# Patient Record
Sex: MALE | Race: WHITE | NOT HISPANIC OR LATINO | Employment: FULL TIME | URBAN - METROPOLITAN AREA
[De-identification: names, ages, dates, MRNs, and addresses within clinical notes are randomized per-mention and may not be internally consistent; named-entity substitution may affect disease eponyms.]

---

## 2023-09-27 ENCOUNTER — OFFICE VISIT (OUTPATIENT)
Dept: OBGYN CLINIC | Facility: CLINIC | Age: 63
End: 2023-09-27
Payer: COMMERCIAL

## 2023-09-27 ENCOUNTER — APPOINTMENT (OUTPATIENT)
Dept: RADIOLOGY | Facility: CLINIC | Age: 63
End: 2023-09-27
Payer: COMMERCIAL

## 2023-09-27 VITALS
HEART RATE: 68 BPM | WEIGHT: 166.2 LBS | BODY MASS INDEX: 26.71 KG/M2 | DIASTOLIC BLOOD PRESSURE: 72 MMHG | SYSTOLIC BLOOD PRESSURE: 113 MMHG | HEIGHT: 66 IN

## 2023-09-27 DIAGNOSIS — M75.22 BICEPS TENDINITIS OF LEFT SHOULDER: Primary | ICD-10-CM

## 2023-09-27 DIAGNOSIS — M25.512 LEFT SHOULDER PAIN, UNSPECIFIED CHRONICITY: ICD-10-CM

## 2023-09-27 PROCEDURE — 73030 X-RAY EXAM OF SHOULDER: CPT

## 2023-09-27 PROCEDURE — 99204 OFFICE O/P NEW MOD 45 MIN: CPT | Performed by: ORTHOPAEDIC SURGERY

## 2023-09-27 RX ORDER — TADALAFIL 5 MG/1
5 TABLET ORAL DAILY
COMMUNITY
Start: 2023-08-08

## 2023-09-27 RX ORDER — TINIDAZOLE 500 MG/1
500 TABLET ORAL DAILY
COMMUNITY
Start: 2023-08-22

## 2023-09-27 RX ORDER — MIRTAZAPINE 15 MG/1
15 TABLET, FILM COATED ORAL
COMMUNITY
Start: 2023-08-08

## 2023-09-27 RX ORDER — TESTOSTERONE CYPIONATE 200 MG/ML
INJECTION, SOLUTION INTRAMUSCULAR
COMMUNITY
Start: 2023-08-22

## 2023-09-27 RX ORDER — DOXYCYCLINE HYCLATE 200 MG/1
1 TABLET, DELAYED RELEASE ORAL DAILY
COMMUNITY
Start: 2023-09-21

## 2023-09-27 RX ORDER — SYRINGE WITH NEEDLE, 1 ML 25GX5/8"
SYRINGE, EMPTY DISPOSABLE MISCELLANEOUS
COMMUNITY
Start: 2023-08-22

## 2023-09-27 RX ORDER — NEEDLES, DISPOSABLE 25GX5/8"
NEEDLE, DISPOSABLE MISCELLANEOUS
COMMUNITY
Start: 2023-08-24

## 2023-09-27 NOTE — PROGRESS NOTES
Assessment/Plan:  1. Biceps tendinitis of left shoulder  XR shoulder 2+ vw left        Mary has left-sided shoulder pain which appears to be related to his biceps tendon. He has anterior shoulder pain and pain with speeds testing and this could be coming from overuse  related to biceps curls. His x-rays are within normal limits and he does not demonstrate any significant weakness to suggest rotator cuff injury on exam today. I discussed treatment options including home exercise, physical therapy or ultrasound-guided biceps tendon sheath injection. He will try home exercises for now and would like to schedule an ultrasound-guided biceps tendon sheath injection in our [facility] office. We will plan on proceeding with this injection in the next few weeks. Follow-up with me for the injection and begin home exercises, ice and anti-inflammatories for now. Subjective:   Allen Dahl is a 59 y.o. male who presents to the office for evaluation for left-sided shoulder pain. He denies any recent injury or trauma. Has been feeling increased discomfort in the left shoulder for the last 1 to 2 months. He states that he likes to exercise and lift weights but the pain has been increased so he has not done so recently. He likes to do push-ups and bicep curls. He denies feeling 1 specific day where the pain began. It seems to worsen in the morning a after he wakes up. He denies any weakness or numbness. He does have a current diagnosis of Lyme disease and is wondering if this is related. He states one of the biggest pains is reaching backwards into the backseat of the car. He has not tried any medications for this. He has only tried resting from physical exercise. Review of Systems   Constitutional: Negative for chills, fever and unexpected weight change. HENT: Negative for hearing loss, nosebleeds and sore throat. Eyes: Negative for pain, redness and visual disturbance.    Respiratory: Negative for cough, shortness of breath and wheezing. Cardiovascular: Negative for chest pain, palpitations and leg swelling. Gastrointestinal: Negative for abdominal pain, nausea and vomiting. Endocrine: Negative for polyphagia and polyuria. Genitourinary: Negative for dysuria and hematuria. Musculoskeletal:        See HPI   Skin: Negative for rash and wound. Neurological: Negative for dizziness, numbness and headaches. Psychiatric/Behavioral: Negative for decreased concentration and suicidal ideas. The patient is not nervous/anxious. Past Medical History:   Diagnosis Date   • Lyme disease        Past Surgical History:   Procedure Laterality Date   • BACK SURGERY         Family History   Family history unknown: Yes       Social History     Occupational History   • Not on file   Tobacco Use   • Smoking status: Every Day     Types: Cigarettes   • Smokeless tobacco: Never   Vaping Use   • Vaping Use: Never used   Substance and Sexual Activity   • Alcohol use: Yes   • Drug use: Never   • Sexual activity: Not on file         Current Outpatient Medications:   •  B-D 3CC LUER-NATANAEL SYR 10FM8-2/2 23G X 1-1/2" 3 ML MISC, use 1 every week, Disp: , Rfl:   •  BD Disp Needle 23G X 1" MISC, use 1 every week, Disp: , Rfl:   •  Doxycycline Hyclate 200 MG TBEC, Take 1 tablet by mouth daily, Disp: , Rfl:   •  mirtazapine (REMERON) 15 mg tablet, Take 15 mg by mouth daily at bedtime, Disp: , Rfl:   •  tadalafil (CIALIS) 5 MG tablet, Take 5 mg by mouth daily, Disp: , Rfl:   •  testosterone cypionate (DEPO-TESTOSTERONE) 200 mg/mL SOLN, inject 1/2 milliliter every week then DISCARD REMAINDER in vial, Disp: , Rfl:   •  tinidazole (TINDAMAX) 500 MG tablet, Take 500 mg by mouth daily, Disp: , Rfl:     No Known Allergies    Objective:  Vitals:    09/27/23 0841   BP: 113/72   Pulse: 68            Left Shoulder Exam     Tenderness   The patient is experiencing tenderness in the biceps tendon.     Range of Motion   Active abduction: normal   Passive abduction: normal   Extension: normal   External rotation: normal   Forward flexion: normal   Internal rotation 0 degrees: normal     Muscle Strength   Abduction: 5/5   Internal rotation: 5/5   External rotation: 5/5   Supraspinatus: 5/5   Subscapularis: 5/5   Biceps: 5/5     Tests   Kang test: negative  Drop arm: negative    Other   Erythema: absent  Sensation: normal     Comments:  Positive Neer's test  Positive speeds test            Physical Exam  Vitals and nursing note reviewed. Constitutional:       Appearance: Normal appearance. He is well-developed. HENT:      Head: Normocephalic and atraumatic. Right Ear: External ear normal.      Left Ear: External ear normal.      Nose: Nose normal.   Eyes:      General: No scleral icterus. Extraocular Movements: Extraocular movements intact. Conjunctiva/sclera: Conjunctivae normal.   Cardiovascular:      Rate and Rhythm: Normal rate. Pulmonary:      Effort: Pulmonary effort is normal. No respiratory distress. Musculoskeletal:      Cervical back: Normal range of motion and neck supple. Comments: See Ortho exam   Skin:     General: Skin is warm and dry. Neurological:      General: No focal deficit present. Mental Status: He is alert and oriented to person, place, and time. Psychiatric:         Behavior: Behavior normal.         I have personally reviewed pertinent films in PACS and my interpretation is as follows:  X-rays of the left shoulder demonstrate no evidence of acute fracture. Small calcification in the distal supraspinatus region      This document was created using speech voice recognition software. Grammatical errors, random word insertions, pronoun errors, and incomplete sentences are an occasional consequence of this system due to software limitations, ambient noise, and hardware issues.    Any formal questions or concerns about content, text, or information contained within the body of this dictation should be directly addressed to the provider for clarification.

## 2023-10-12 VITALS
HEIGHT: 66 IN | HEART RATE: 90 BPM | WEIGHT: 166 LBS | DIASTOLIC BLOOD PRESSURE: 80 MMHG | SYSTOLIC BLOOD PRESSURE: 140 MMHG | BODY MASS INDEX: 26.68 KG/M2

## 2023-10-12 DIAGNOSIS — M75.22 BICEPS TENDINITIS OF LEFT SHOULDER: Primary | ICD-10-CM

## 2023-10-12 PROCEDURE — 20611 DRAIN/INJ JOINT/BURSA W/US: CPT | Performed by: ORTHOPAEDIC SURGERY

## 2023-10-12 PROCEDURE — 99213 OFFICE O/P EST LOW 20 MIN: CPT | Performed by: ORTHOPAEDIC SURGERY

## 2023-10-12 RX ADMIN — LIDOCAINE HYDROCHLORIDE 2 ML: 10 INJECTION, SOLUTION INFILTRATION; PERINEURAL at 15:15

## 2023-10-12 RX ADMIN — TRIAMCINOLONE ACETONIDE 40 MG: 40 INJECTION, SUSPENSION INTRA-ARTICULAR; INTRAMUSCULAR at 15:15

## 2023-10-12 NOTE — PROGRESS NOTES
Assessment/Plan:  1. Biceps tendinitis of left shoulder  Large joint arthrocentesis          Mary has left shoulder pain and significant discomfort and fluid around the biceps tendon sheath visualized on ultrasound today. We did proceed with an ultrasound-guided biceps tendon sheath injection today and he tolerated this well this did give him immediate relief. Hopefully this gives him longstanding relief over the next few weeks with activity. He will follow-up with me if the pain persists or worsens. Large joint arthrocentesis  Universal Protocol:  Risks and benefits: risks, benefits and alternatives were discussed  Consent given by: patient  Time out: Immediately prior to procedure a "time out" was called to verify the correct patient, procedure, equipment, support staff and site/side marked as required. Site marked: the operative site was marked  Supporting Documentation  Indications: pain   Procedure Details  Location: shoulder - Shoulder joint: Left proximal biceps tendon sheath. Preparation: Patient was prepped and draped in the usual sterile fashion  Needle size: 25 G  Ultrasound guidance: yes  Approach: anterior  Medications administered: 2 mL lidocaine 1 %; 40 mg triamcinolone acetonide 40 mg/mL    Patient tolerance: patient tolerated the procedure well with no immediate complications  Dressing:  Sterile dressing applied          Subjective:   Stephen Tafoya is a 59 y.o. male who presents for an ultrasound-guided biceps tendon sheath injection. He has been feeling ongoing discomfort in the anterior aspect of the shoulder and at last office visit I felt that his pain was most consistent with biceps tendinitis. He continues to have discomfort over the anterior portion of shoulder that worsens with moving or reaching behind him. He denies any recent injury or fall. Review of Systems   Constitutional:  Negative for chills, fever and unexpected weight change.    HENT:  Negative for hearing loss, nosebleeds and sore throat. Eyes:  Negative for pain, redness and visual disturbance. Respiratory:  Negative for cough, shortness of breath and wheezing. Cardiovascular:  Negative for chest pain, palpitations and leg swelling. Gastrointestinal:  Negative for abdominal pain, nausea and vomiting. Endocrine: Negative for polyphagia and polyuria. Genitourinary:  Negative for dysuria and hematuria. Musculoskeletal:         See HPI   Skin:  Negative for rash and wound. Neurological:  Negative for dizziness, numbness and headaches. Psychiatric/Behavioral:  Negative for decreased concentration and suicidal ideas. The patient is not nervous/anxious.           Past Medical History:   Diagnosis Date    Lyme disease        Past Surgical History:   Procedure Laterality Date    BACK SURGERY         Family History   Family history unknown: Yes       Social History     Occupational History    Not on file   Tobacco Use    Smoking status: Every Day     Types: Cigarettes    Smokeless tobacco: Never   Vaping Use    Vaping Use: Never used   Substance and Sexual Activity    Alcohol use: Yes    Drug use: Never    Sexual activity: Not on file         Current Outpatient Medications:     B-D 3CC LUER-NATANAEL SYR 13XI8-2/2 23G X 1-1/2" 3 ML MISC, use 1 every week, Disp: , Rfl:     BD Disp Needle 23G X 1" MISC, use 1 every week, Disp: , Rfl:     Doxycycline Hyclate 200 MG TBEC, Take 1 tablet by mouth daily, Disp: , Rfl:     mirtazapine (REMERON) 15 mg tablet, Take 15 mg by mouth daily at bedtime, Disp: , Rfl:     tadalafil (CIALIS) 5 MG tablet, Take 5 mg by mouth daily, Disp: , Rfl:     testosterone cypionate (DEPO-TESTOSTERONE) 200 mg/mL SOLN, inject 1/2 milliliter every week then DISCARD REMAINDER in vial, Disp: , Rfl:     tinidazole (TINDAMAX) 500 MG tablet, Take 500 mg by mouth daily, Disp: , Rfl:     No Known Allergies    Objective:  Vitals:    10/12/23 1523   BP: 140/80   Pulse: 90            Left Shoulder Exam Tenderness   The patient is experiencing tenderness in the biceps tendon. Physical Exam  Vitals and nursing note reviewed. Constitutional:       Appearance: Normal appearance. He is well-developed. HENT:      Head: Normocephalic and atraumatic. Right Ear: External ear normal.      Left Ear: External ear normal.      Nose: Nose normal.   Eyes:      General: No scleral icterus. Extraocular Movements: Extraocular movements intact. Conjunctiva/sclera: Conjunctivae normal.   Cardiovascular:      Rate and Rhythm: Normal rate. Pulmonary:      Effort: Pulmonary effort is normal. No respiratory distress. Musculoskeletal:      Cervical back: Normal range of motion and neck supple. Comments: See Ortho exam   Skin:     General: Skin is warm and dry. Neurological:      General: No focal deficit present. Mental Status: He is alert and oriented to person, place, and time. Psychiatric:         Behavior: Behavior normal.           This document was created using speech voice recognition software. Grammatical errors, random word insertions, pronoun errors, and incomplete sentences are an occasional consequence of this system due to software limitations, ambient noise, and hardware issues. Any formal questions or concerns about content, text, or information contained within the body of this dictation should be directly addressed to the provider for clarification.

## 2023-10-13 RX ORDER — TRIAMCINOLONE ACETONIDE 40 MG/ML
40 INJECTION, SUSPENSION INTRA-ARTICULAR; INTRAMUSCULAR
Status: COMPLETED | OUTPATIENT
Start: 2023-10-12 | End: 2023-10-12

## 2023-10-13 RX ORDER — LIDOCAINE HYDROCHLORIDE 10 MG/ML
2 INJECTION, SOLUTION INFILTRATION; PERINEURAL
Status: COMPLETED | OUTPATIENT
Start: 2023-10-12 | End: 2023-10-12

## 2024-05-15 ENCOUNTER — OFFICE VISIT (OUTPATIENT)
Dept: OBGYN CLINIC | Facility: CLINIC | Age: 64
End: 2024-05-15
Payer: COMMERCIAL

## 2024-05-15 VITALS
HEIGHT: 66 IN | WEIGHT: 166 LBS | HEART RATE: 90 BPM | SYSTOLIC BLOOD PRESSURE: 130 MMHG | DIASTOLIC BLOOD PRESSURE: 80 MMHG | BODY MASS INDEX: 26.68 KG/M2

## 2024-05-15 DIAGNOSIS — M75.22 BICEPS TENDINITIS OF LEFT SHOULDER: ICD-10-CM

## 2024-05-15 DIAGNOSIS — M75.42 IMPINGEMENT SYNDROME OF LEFT SHOULDER: Primary | ICD-10-CM

## 2024-05-15 PROCEDURE — 20610 DRAIN/INJ JOINT/BURSA W/O US: CPT | Performed by: ORTHOPAEDIC SURGERY

## 2024-05-15 PROCEDURE — 99213 OFFICE O/P EST LOW 20 MIN: CPT | Performed by: ORTHOPAEDIC SURGERY

## 2024-05-15 RX ORDER — LIDOCAINE HYDROCHLORIDE 10 MG/ML
4 INJECTION, SOLUTION INFILTRATION; PERINEURAL
Status: COMPLETED | OUTPATIENT
Start: 2024-05-15 | End: 2024-05-15

## 2024-05-15 RX ORDER — DEXAMETHASONE SODIUM PHOSPHATE 10 MG/ML
40 INJECTION, SOLUTION INTRAMUSCULAR; INTRAVENOUS
Status: COMPLETED | OUTPATIENT
Start: 2024-05-15 | End: 2024-05-15

## 2024-05-15 RX ORDER — SILDENAFIL 50 MG/1
50 TABLET, FILM COATED ORAL
COMMUNITY
Start: 2024-03-29

## 2024-05-15 RX ADMIN — LIDOCAINE HYDROCHLORIDE 4 ML: 10 INJECTION, SOLUTION INFILTRATION; PERINEURAL at 14:30

## 2024-05-15 RX ADMIN — DEXAMETHASONE SODIUM PHOSPHATE 40 MG: 10 INJECTION, SOLUTION INTRAMUSCULAR; INTRAVENOUS at 14:30

## 2024-05-15 NOTE — PROGRESS NOTES
"Assessment/Plan:  1. Impingement syndrome of left shoulder        2. Biceps tendinitis of left shoulder            Mary has left-sided shoulder pain which appears to be more consistent with impingement syndrome in the left shoulder rather than repeat biceps tendinitis in the left shoulder.  We did discuss proceeding with a left subacromial cortisone injection and he tolerated this quite well today.  Hopefully this gives him significant relief in the next few weeks.  I did recommend rotator cuff strengthening exercises that he can do on his own.  Formal physical therapy would be an option in the future.  If his pain persists or does not improve over the next 2 to 3 weeks then we could consider an ultrasound-guided biceps tendon sheath injection.  He can certainly call and set this up if the pain persist.      Large joint arthrocentesis: L subacromial bursa  Universal Protocol:  Consent: Verbal consent obtained.  Risks and benefits: risks, benefits and alternatives were discussed  Consent given by: patient  Time out: Immediately prior to procedure a \"time out\" was called to verify the correct patient, procedure, equipment, support staff and site/side marked as required.  Site marked: the operative site was marked  Supporting Documentation  Indications: pain   Procedure Details  Location: shoulder - L subacromial bursa  Preparation: Patient was prepped and draped in the usual sterile fashion  Needle size: 22 G  Ultrasound guidance: no  Approach: posterior  Medications administered: 40 mg dexamethasone 100 mg/10 mL; 4 mL lidocaine 1 %    Patient tolerance: patient tolerated the procedure well with no immediate complications  Dressing:  Sterile dressing applied          Subjective:   Mary Russo is a 64 y.o. male who presents to the office for follow-up for left-sided shoulder pain.  He was last in the office 7 months ago with shoulder discomfort consistent with biceps tendinitis in the left shoulder.  He underwent an " ultrasound-guided biceps tendon sheath injection which significantly helped his pain at that time.  He states recently he has increased pain in the left shoulder.  He states he does not exactly feel the same.  He does not feel any pain with bicep curling  when he is weight lifting but does feel pain with bench press or any overhead movement.  He feels a burning/aching pain in the superior aspect of the shoulder.  It does improve with rest.  He denies any traumatic injury or feeling a pop popping sensation of the shoulder.      Review of Systems   Constitutional:  Negative for chills, fever and unexpected weight change.   HENT:  Negative for hearing loss, nosebleeds and sore throat.    Eyes:  Negative for pain, redness and visual disturbance.   Respiratory:  Negative for cough, shortness of breath and wheezing.    Cardiovascular:  Negative for chest pain, palpitations and leg swelling.   Gastrointestinal:  Negative for abdominal pain, nausea and vomiting.   Endocrine: Negative for polyphagia and polyuria.   Genitourinary:  Negative for dysuria and hematuria.   Musculoskeletal:         See HPI   Skin:  Negative for rash and wound.   Neurological:  Negative for dizziness, numbness and headaches.   Psychiatric/Behavioral:  Negative for decreased concentration and suicidal ideas. The patient is not nervous/anxious.          Past Medical History:   Diagnosis Date    Lyme disease        Past Surgical History:   Procedure Laterality Date    BACK SURGERY         Family History   Family history unknown: Yes       Social History     Occupational History    Not on file   Tobacco Use    Smoking status: Every Day     Types: Cigarettes    Smokeless tobacco: Never   Vaping Use    Vaping status: Never Used   Substance and Sexual Activity    Alcohol use: Yes    Drug use: Never    Sexual activity: Not on file         Current Outpatient Medications:     sildenafil (VIAGRA) 50 MG tablet, Take 50 mg by mouth, Disp: , Rfl:     B-D 3CC  "LUER-NATANAEL SYR 73NQ4-2/2 23G X 1-1/2\" 3 ML MISC, use 1 every week, Disp: , Rfl:     BD Disp Needle 23G X 1\" MISC, use 1 every week, Disp: , Rfl:     Doxycycline Hyclate 200 MG TBEC, Take 1 tablet by mouth daily, Disp: , Rfl:     mirtazapine (REMERON) 15 mg tablet, Take 15 mg by mouth daily at bedtime, Disp: , Rfl:     tadalafil (CIALIS) 5 MG tablet, Take 5 mg by mouth daily, Disp: , Rfl:     testosterone cypionate (DEPO-TESTOSTERONE) 200 mg/mL SOLN, inject 1/2 milliliter every week then DISCARD REMAINDER in vial, Disp: , Rfl:     tinidazole (TINDAMAX) 500 MG tablet, Take 500 mg by mouth daily, Disp: , Rfl:     No Known Allergies    Objective:  Vitals:    05/15/24 1432   BP: 130/80   Pulse: 90            Left Shoulder Exam     Tenderness   Left shoulder tenderness location: Subacromial space.    Range of Motion   Active abduction:  normal   Passive abduction:  normal   Extension:  normal   External rotation:  normal   Forward flexion:  normal   Internal rotation 0 degrees:  normal     Muscle Strength   Abduction: 5/5   Internal rotation: 5/5   External rotation: 5/5   Supraspinatus: 5/5   Subscapularis: 5/5   Biceps: 5/5     Tests   Kang test: positive  Drop arm: negative    Other   Erythema: absent  Sensation: normal  Pulse: present     Comments:  Negative speeds test  Pos. obriens            Physical Exam  Vitals reviewed.   Constitutional:       Appearance: He is well-developed.   HENT:      Head: Normocephalic and atraumatic.   Eyes:      Conjunctiva/sclera: Conjunctivae normal.      Pupils: Pupils are equal, round, and reactive to light.   Cardiovascular:      Rate and Rhythm: Normal rate.      Pulses: Normal pulses.   Pulmonary:      Effort: Pulmonary effort is normal. No respiratory distress.   Musculoskeletal:      Cervical back: Normal range of motion and neck supple.      Comments: As noted in HPI   Skin:     General: Skin is warm and dry.   Neurological:      General: No focal deficit present.      Mental " Status: He is alert and oriented to person, place, and time.   Psychiatric:         Mood and Affect: Mood normal.         Behavior: Behavior normal.           This document was created using speech voice recognition software.   Grammatical errors, random word insertions, pronoun errors, and incomplete sentences are an occasional consequence of this system due to software limitations, ambient noise, and hardware issues.   Any formal questions or concerns about content, text, or information contained within the body of this dictation should be directly addressed to the provider for clarification.

## 2024-08-09 ENCOUNTER — TELEPHONE (OUTPATIENT)
Dept: OBGYN CLINIC | Facility: MEDICAL CENTER | Age: 64
End: 2024-08-09

## 2024-08-09 NOTE — TELEPHONE ENCOUNTER
Caller:  Mary    Doctor:  Dr. Wing     Reason for call: The patient is looking to get in ASAP for an USGI - left shoulder. He will only have insurance until 8/15.      Pain level is an 8  last USGI was 10/12/23     Please call to advise.    Call back#: [phone]

## 2024-08-15 ENCOUNTER — OFFICE VISIT (OUTPATIENT)
Dept: OBGYN CLINIC | Facility: CLINIC | Age: 64
End: 2024-08-15
Payer: COMMERCIAL

## 2024-08-15 VITALS
SYSTOLIC BLOOD PRESSURE: 155 MMHG | HEART RATE: 68 BPM | HEIGHT: 66 IN | BODY MASS INDEX: 26.68 KG/M2 | WEIGHT: 166 LBS | DIASTOLIC BLOOD PRESSURE: 87 MMHG

## 2024-08-15 DIAGNOSIS — M75.22 BICEPS TENDINITIS OF LEFT SHOULDER: Primary | ICD-10-CM

## 2024-08-15 PROCEDURE — 99213 OFFICE O/P EST LOW 20 MIN: CPT | Performed by: ORTHOPAEDIC SURGERY

## 2024-08-15 PROCEDURE — 20611 DRAIN/INJ JOINT/BURSA W/US: CPT | Performed by: ORTHOPAEDIC SURGERY

## 2024-08-15 RX ORDER — TRIAMCINOLONE ACETONIDE 40 MG/ML
40 INJECTION, SUSPENSION INTRA-ARTICULAR; INTRAMUSCULAR
Status: COMPLETED | OUTPATIENT
Start: 2024-08-15 | End: 2024-08-15

## 2024-08-15 RX ORDER — AZITHROMYCIN 250 MG/1
TABLET, FILM COATED ORAL
COMMUNITY
Start: 2024-06-29

## 2024-08-15 RX ORDER — LIDOCAINE HYDROCHLORIDE 10 MG/ML
4 INJECTION, SOLUTION INFILTRATION; PERINEURAL
Status: COMPLETED | OUTPATIENT
Start: 2024-08-15 | End: 2024-08-15

## 2024-08-15 RX ADMIN — LIDOCAINE HYDROCHLORIDE 4 ML: 10 INJECTION, SOLUTION INFILTRATION; PERINEURAL at 08:15

## 2024-08-15 RX ADMIN — TRIAMCINOLONE ACETONIDE 40 MG: 40 INJECTION, SUSPENSION INTRA-ARTICULAR; INTRAMUSCULAR at 08:15

## 2024-08-15 NOTE — PROGRESS NOTES
"Assessment/Plan:  1. Biceps tendinitis of left shoulder            Mary has left-sided shoulder pain consistent with biceps tendinitis.  On repeat ultrasound today he had a large amount of fluid surrounding the biceps tendon sheath.  He tolerated an ultrasound-guided biceps tendon sheath injection quite well and this did give him relief immediately.  Hopefully this gives him longstanding relief over the biceps tendon sheath and reduces the localized inflammation.  If the pain persist or worsens we could consider further imaging with MRI if clinically indicated.    Large joint arthrocentesis  Universal Protocol:  Consent: Verbal consent obtained.  Risks and benefits: risks, benefits and alternatives were discussed  Consent given by: patient  Time out: Immediately prior to procedure a \"time out\" was called to verify the correct patient, procedure, equipment, support staff and site/side marked as required.  Site marked: the operative site was marked  Supporting Documentation  Indications: pain   Procedure Details  Location: shoulder - Shoulder joint: Left proximal biceps tendon sheath.  Preparation: Patient was prepped and draped in the usual sterile fashion  Needle size: 22 G  Ultrasound guidance: yes  Approach: posterior  Medications administered: 4 mL lidocaine 1 %; 40 mg triamcinolone acetonide 40 mg/mL    Patient tolerance: patient tolerated the procedure well with no immediate complications  Dressing:  Sterile dressing applied              Subjective:   Mary Russo is a 64 y.o. male who presents to the office for follow-up for a biceps tendinitis to the left shoulder.  He had a previous ultrasound-guided biceps tendon sheath injection in his left shoulder which gave him significant relief.  This was done about 1 year ago.  He denies any new injury.  He was in the office 3 months ago where we tried a subacromial injection to see if this would help him more but he states that the bicep injection has always given " "him more relief.  He feels as of late that the pain seems to be increasing over the anterior aspect of the shoulder.  He denies any numbness or tingling down the arm.  Denies any bruising or swelling.      Review of Systems      Past Medical History:   Diagnosis Date    Lyme disease        Past Surgical History:   Procedure Laterality Date    BACK SURGERY         Family History   Family history unknown: Yes       Social History     Occupational History    Not on file   Tobacco Use    Smoking status: Every Day     Types: Cigarettes    Smokeless tobacco: Never   Vaping Use    Vaping status: Never Used   Substance and Sexual Activity    Alcohol use: Yes    Drug use: Never    Sexual activity: Not on file         Current Outpatient Medications:     B-D 3CC LUER-NATANAEL SYR 35KB8-4/2 23G X 1-1/2\" 3 ML MISC, use 1 every week, Disp: , Rfl:     BD Disp Needle 23G X 1\" MISC, use 1 every week, Disp: , Rfl:     tadalafil (CIALIS) 5 MG tablet, Take 5 mg by mouth daily, Disp: , Rfl:     azithromycin (ZITHROMAX) 250 mg tablet, take 2 tablets by mouth today then take 1 tablet daily for 4 days (Patient not taking: Reported on 8/15/2024), Disp: , Rfl:     Doxycycline Hyclate 200 MG TBEC, Take 1 tablet by mouth daily (Patient not taking: Reported on 8/15/2024), Disp: , Rfl:     mirtazapine (REMERON) 15 mg tablet, Take 15 mg by mouth daily at bedtime (Patient not taking: Reported on 8/15/2024), Disp: , Rfl:     sildenafil (VIAGRA) 50 MG tablet, Take 50 mg by mouth (Patient not taking: Reported on 8/15/2024), Disp: , Rfl:     testosterone cypionate (DEPO-TESTOSTERONE) 200 mg/mL SOLN, inject 1/2 milliliter every week then DISCARD REMAINDER in vial (Patient not taking: Reported on 8/15/2024), Disp: , Rfl:     tinidazole (TINDAMAX) 500 MG tablet, Take 500 mg by mouth daily (Patient not taking: Reported on 8/15/2024), Disp: , Rfl:     No Known Allergies    Objective:  Vitals:    08/15/24 0822   BP: 155/87   Pulse: 68     Pain Score:   " 8      Left Shoulder Exam     Tenderness   The patient is experiencing tenderness in the biceps tendon.    Range of Motion   Active abduction:  normal   Passive abduction:  normal   Extension:  normal   External rotation:  normal   Forward flexion:  normal   Internal rotation 0 degrees:  normal     Muscle Strength   Abduction: 5/5   Internal rotation: 5/5   External rotation: 5/5   Supraspinatus: 5/5   Subscapularis: 5/5     Other   Erythema: absent  Sensation: normal  Pulse: present             Physical Exam  Vitals reviewed.   Constitutional:       Appearance: He is well-developed.   HENT:      Head: Normocephalic and atraumatic.   Eyes:      Conjunctiva/sclera: Conjunctivae normal.      Pupils: Pupils are equal, round, and reactive to light.   Cardiovascular:      Rate and Rhythm: Normal rate.      Pulses: Normal pulses.   Pulmonary:      Effort: Pulmonary effort is normal. No respiratory distress.   Musculoskeletal:      Cervical back: Normal range of motion and neck supple.      Comments: As noted in HPI   Skin:     General: Skin is warm and dry.   Neurological:      General: No focal deficit present.      Mental Status: He is alert and oriented to person, place, and time.   Psychiatric:         Mood and Affect: Mood normal.         Behavior: Behavior normal.             This document was created using speech voice recognition software.   Grammatical errors, random word insertions, pronoun errors, and incomplete sentences are an occasional consequence of this system due to software limitations, ambient noise, and hardware issues.   Any formal questions or concerns about content, text, or information contained within the body of this dictation should be directly addressed to the provider for clarification.

## 2025-01-23 ENCOUNTER — APPOINTMENT (OUTPATIENT)
Dept: RADIOLOGY | Facility: CLINIC | Age: 65
End: 2025-01-23
Payer: COMMERCIAL

## 2025-01-23 ENCOUNTER — OFFICE VISIT (OUTPATIENT)
Dept: OBGYN CLINIC | Facility: CLINIC | Age: 65
End: 2025-01-23
Payer: COMMERCIAL

## 2025-01-23 VITALS — WEIGHT: 166 LBS | HEIGHT: 66 IN | BODY MASS INDEX: 26.68 KG/M2

## 2025-01-23 DIAGNOSIS — M24.812 INTERNAL DERANGEMENT OF LEFT SHOULDER: ICD-10-CM

## 2025-01-23 DIAGNOSIS — M25.511 RIGHT SHOULDER PAIN, UNSPECIFIED CHRONICITY: ICD-10-CM

## 2025-01-23 DIAGNOSIS — M25.511 RIGHT SHOULDER PAIN, UNSPECIFIED CHRONICITY: Primary | ICD-10-CM

## 2025-01-23 PROCEDURE — 73030 X-RAY EXAM OF SHOULDER: CPT

## 2025-01-23 PROCEDURE — 99214 OFFICE O/P EST MOD 30 MIN: CPT | Performed by: ORTHOPAEDIC SURGERY

## 2025-01-23 NOTE — PROGRESS NOTES
Assessment/Plan:  1. Right shoulder pain, unspecified chronicity  XR shoulder 2+ vw right      2. Internal derangement of left shoulder  XR shoulder 2+ vw left    MRI shoulder left wo contrast        Scribe Attestation      I,:  Randy Montgomery MA am acting as a scribe while in the presence of the attending physician.:       I,:  Storm Wing,  personally performed the services described in this documentation    as scribed in my presence.:               Mary and I had a lengthy conversation today.  It is difficult to ascertain if his shoulder pain is related to Lyme's.  He is presenting with new weakness in the left shoulder comparison to the last visit.  The weakness involves the supraspinatus tendon.  I have concern that he may have suffered a rotator cuff tear that is likely degenerative in nature.  I would like him to have an MRI of the left shoulder questioning rotator cuff tear.  Should a high-grade tear be found he may require surgical intervention thus the data from the MRI will be very valuable.  If no tear or small tear is found this can be treated conservatively with physical therapy.  The right shoulder is presenting with rotator cuff and biceps tendinitis.  Once the data is available from the MRI of the left shoulder I would like him to return to see me.  I will then formulate a plan of care for both shoulders.    Subjective:   Mary Russo is a 64 y.o. male who presents today for bilateral shoulder pain.  Mary has a known history of left shoulder subacromial bursitis and biceps tendinitis.  At his last visit in August 2024 he did receive an ultrasound-guided biceps tendon injection which did provide significant relief for months.  Unfortunately his left shoulder discomfort has returned.  He denies new injury. Mary states he is now experiencing pain in the right shoulder as well.  He describes that pain as an ache and fatigue located in the anterior aspect of the shoulder that is  "nonradiating.  The left is somewhat similar.  He states upon waking in the morning both shoulders are very stiff.  At the end of the day he feels bilateral shoulder fatigue.  He has concerns this is related to lymes disease diagnosed last year. He denies distal paresthesias.      Review of Systems   Constitutional:  Negative for chills, fever and unexpected weight change.   HENT:  Negative for hearing loss, nosebleeds and sore throat.    Eyes:  Negative for pain, redness and visual disturbance.   Respiratory:  Negative for cough, shortness of breath and wheezing.    Cardiovascular:  Negative for chest pain, palpitations and leg swelling.   Gastrointestinal:  Negative for abdominal pain, nausea and vomiting.   Endocrine: Negative for polyphagia and polyuria.   Genitourinary:  Negative for dysuria and hematuria.   Musculoskeletal:         See HPI   Skin:  Negative for rash and wound.   Neurological:  Negative for dizziness, numbness and headaches.   Psychiatric/Behavioral:  Negative for decreased concentration and suicidal ideas. The patient is not nervous/anxious.          Past Medical History:   Diagnosis Date    Lyme disease        Past Surgical History:   Procedure Laterality Date    BACK SURGERY         Family History   Family history unknown: Yes       Social History     Occupational History    Not on file   Tobacco Use    Smoking status: Every Day     Types: Cigarettes    Smokeless tobacco: Never   Vaping Use    Vaping status: Never Used   Substance and Sexual Activity    Alcohol use: Yes    Drug use: Never    Sexual activity: Not on file         Current Outpatient Medications:     azithromycin (ZITHROMAX) 250 mg tablet, take 2 tablets by mouth today then take 1 tablet daily for 4 days (Patient not taking: Reported on 8/15/2024), Disp: , Rfl:     B-D 3CC LUER-NATANAEL SYR 75CR1-2/2 23G X 1-1/2\" 3 ML MISC, use 1 every week, Disp: , Rfl:     BD Disp Needle 23G X 1\" MISC, use 1 every week, Disp: , Rfl:     Doxycycline " Hyclate 200 MG TBEC, Take 1 tablet by mouth daily (Patient not taking: Reported on 8/15/2024), Disp: , Rfl:     mirtazapine (REMERON) 15 mg tablet, Take 15 mg by mouth daily at bedtime (Patient not taking: Reported on 8/15/2024), Disp: , Rfl:     sildenafil (VIAGRA) 50 MG tablet, Take 50 mg by mouth (Patient not taking: Reported on 8/15/2024), Disp: , Rfl:     tadalafil (CIALIS) 5 MG tablet, Take 5 mg by mouth daily, Disp: , Rfl:     testosterone cypionate (DEPO-TESTOSTERONE) 200 mg/mL SOLN, inject 1/2 milliliter every week then DISCARD REMAINDER in vial (Patient not taking: Reported on 8/15/2024), Disp: , Rfl:     tinidazole (TINDAMAX) 500 MG tablet, Take 500 mg by mouth daily (Patient not taking: Reported on 8/15/2024), Disp: , Rfl:     No Known Allergies    Objective:  There were no vitals filed for this visit.    Right Shoulder Exam     Tenderness   Right shoulder tenderness location: glenohumeral joint.    Range of Motion   Active abduction:  160   External rotation:  80   Forward flexion:  170   Internal rotation 0 degrees:  L1     Muscle Strength   Abduction: 5/5   Internal rotation: 5/5   External rotation: 5/5   Supraspinatus: 5/5   Subscapularis: 5/5   Biceps: 4/5     Tests   Impingement: negative    Comments:  (+) Speed's      Left Shoulder Exam     Range of Motion   Active abduction:  160   External rotation:  80   Forward flexion:  170   Internal rotation 0 degrees:  L1     Muscle Strength   Abduction: 4/5   Internal rotation: 5/5   External rotation: 5/5   Supraspinatus: 4/5   Subscapularis: 5/5   Biceps: 4/5     Tests   Impingement: negative     Comments:  (+) Kang's            Physical Exam  Vitals reviewed.   Constitutional:       Appearance: He is well-developed.   HENT:      Head: Normocephalic and atraumatic.   Eyes:      General:         Right eye: No discharge.         Left eye: No discharge.      Conjunctiva/sclera: Conjunctivae normal.   Cardiovascular:      Rate and Rhythm: Regular rhythm.    Pulmonary:      Effort: Pulmonary effort is normal. No respiratory distress.   Musculoskeletal:      Cervical back: Normal range of motion and neck supple.      Comments: As noted in the HPI     Skin:     General: Skin is warm and dry.   Neurological:      Mental Status: He is alert and oriented to person, place, and time.   Psychiatric:         Behavior: Behavior normal.         I have personally reviewed pertinent films in PACS and my interpretation is as follows:  X-rays of the left shoulder taken today are normal.  There is no evidence of acute fracture or other osseous abnormality.  X-rays of the right shoulder taken today show evidence of calcific tendinitis.  There is no evidence of acute fracture or other osseous abnormality.      This document was created using speech voice recognition software.   Grammatical errors, random word insertions, pronoun errors, and incomplete sentences are an occasional consequence of this system due to software limitations, ambient noise, and hardware issues.   Any formal questions or concerns about content, text, or information contained within the body of this dictation should be directly addressed to the provider for clarification.

## 2025-01-24 ENCOUNTER — OFFICE VISIT (OUTPATIENT)
Dept: FAMILY MEDICINE CLINIC | Facility: CLINIC | Age: 65
End: 2025-01-24
Payer: COMMERCIAL

## 2025-01-24 VITALS
SYSTOLIC BLOOD PRESSURE: 136 MMHG | WEIGHT: 160.8 LBS | OXYGEN SATURATION: 97 % | RESPIRATION RATE: 18 BRPM | TEMPERATURE: 97.6 F | HEART RATE: 73 BPM | DIASTOLIC BLOOD PRESSURE: 80 MMHG | BODY MASS INDEX: 26.79 KG/M2 | HEIGHT: 65 IN

## 2025-01-24 DIAGNOSIS — G89.29 CHRONIC LEFT SHOULDER PAIN: ICD-10-CM
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
## 2025-01-24 DIAGNOSIS — Z76.89 ENCOUNTER TO ESTABLISH CARE: ICD-10-CM

## 2025-01-24 DIAGNOSIS — Z12.5 SCREENING FOR PROSTATE CANCER: ICD-10-CM

## 2025-01-24 DIAGNOSIS — M25.50 POLYARTHRALGIA: ICD-10-CM

## 2025-01-24 DIAGNOSIS — Z11.4 SCREENING FOR HIV (HUMAN IMMUNODEFICIENCY VIRUS): ICD-10-CM

## 2025-01-24 DIAGNOSIS — M25.512 CHRONIC LEFT SHOULDER PAIN: ICD-10-CM

## 2025-01-24 DIAGNOSIS — Z11.59 NEED FOR HEPATITIS C SCREENING TEST: ICD-10-CM

## 2025-01-24 DIAGNOSIS — Z12.11 SCREENING FOR MALIGNANT NEOPLASM OF COLON: ICD-10-CM

## 2025-01-24 DIAGNOSIS — Z13.1 SCREENING FOR DIABETES MELLITUS (DM): ICD-10-CM

## 2025-01-24 DIAGNOSIS — Z13.220 SCREENING FOR LIPID DISORDERS: ICD-10-CM

## 2025-01-24 DIAGNOSIS — E29.1 HYPOGONADISM MALE: Primary | ICD-10-CM

## 2025-01-24 DIAGNOSIS — Z72.0 TOBACCO ABUSE: ICD-10-CM

## 2025-01-24 DIAGNOSIS — M54.2 NECK PAIN: ICD-10-CM

## 2025-01-24 DIAGNOSIS — A69.20 LYME DISEASE: ICD-10-CM

## 2025-01-24 DIAGNOSIS — R53.83 OTHER FATIGUE: ICD-10-CM

## 2025-01-24 PROCEDURE — 99204 OFFICE O/P NEW MOD 45 MIN: CPT | Performed by: NURSE PRACTITIONER

## 2025-01-24 RX ORDER — TESTOSTERONE CYPIONATE 200 MG/ML
100 INJECTION, SOLUTION INTRAMUSCULAR
Qty: 10 ML | Refills: 0 | Status: SHIPPED | OUTPATIENT
Start: 2025-01-24

## 2025-01-24 NOTE — ASSESSMENT & PLAN NOTE
Continue testosterone injections weekly  Urology to manage going forward  Orders:    Ambulatory Referral to Urology; Future    Testosterone, free, total; Future    testosterone cypionate (DEPO-TESTOSTERONE) 200 mg/mL SOLN; Inject 0.5 mL (100 mg total) into a muscle every 7 days

## 2025-01-24 NOTE — ASSESSMENT & PLAN NOTE
Orders:    Lyme Total AB W Reflex to IGM/IGG; Future    CMV IgG/IgM Antibodies; Future    Bartonella anitbody panel; Future    Babesia microti antibody, IgG & Igm; Future

## 2025-01-24 NOTE — PROGRESS NOTES
Name: Mary Russo      : 1960      MRN: [id number]  Encounter Provider: LIZA Gary  Encounter Date: 2025   Encounter department: [facility]  :  Assessment & Plan  Hypogonadism male  Continue testosterone injections weekly  Urology to manage going forward  Orders:    Ambulatory Referral to Urology; Future    Testosterone, free, total; Future    testosterone cypionate (DEPO-TESTOSTERONE) 200 mg/mL SOLN; Inject 0.5 mL (100 mg total) into a muscle every 7 days    Lyme disease  Orders:    Lyme Total AB W Reflex to IGM/IGG; Future    CMV IgG/IgM Antibodies; Future    Bartonella anitbody panel; Future    Babesia microti antibody, IgG & Igm; Future    Encounter to establish care  Heart healthy, carbohydrate controlled diet- limit red meat, limit saturated fat, moderate salt intake, limit junk food, etc.   Regular exercise  Stress management  Routine labwork and screenings as ordered.          Screening for diabetes mellitus (DM)  Orders:    Hemoglobin A1C; Future    Screening for lipid disorders  Orders:    Lipid panel; Future    Need for hepatitis C screening test  Orders:    Hepatitis C antibody; Future    Screening for HIV (human immunodeficiency virus)  Orders:    HIV 1/2 AG/AB W REFLEX [facility] and [facility] only; Future    Screening for prostate cancer  Orders:    PSA, total and free; Future    Chronic left shoulder pain  Follow up with ortho  Orders:    Comprehensive metabolic panel; Future    Lyme Total AB W Reflex to IGM/IGG; Future    DERIC Screen w/Reflex Cascade; Future    Rheumatoid Arthritis Factor; Future    C-reactive protein; Future    CMV IgG/IgM Antibodies; Future    Bartonella anitbody panel; Future    Babesia microti antibody, IgG & Igm; Future    Tick bite, unspecified site, subsequent encounter  Orders:    CBC and differential; Future    Lyme Total AB W Reflex to IGM/IGG; Future    CMV IgG/IgM Antibodies; Future    Bartonella anitbody panel; Future     Babesia microti antibody, IgG & Igm; Future    Neck pain  Orders:    CBC and differential; Future    Comprehensive metabolic panel; Future    Lyme Total AB W Reflex to IGM/IGG; Future    DERIC Screen w/Reflex Cascade; Future    Rheumatoid Arthritis Factor; Future    C-reactive protein; Future    CMV IgG/IgM Antibodies; Future    Bartonella anitbody panel; Future    Babesia microti antibody, IgG & Igm; Future    Polyarthralgia  Orders:    CBC and differential; Future    Comprehensive metabolic panel; Future    Lyme Total AB W Reflex to IGM/IGG; Future    DERIC Screen w/Reflex Cascade; Future    Rheumatoid Arthritis Factor; Future    C-reactive protein; Future    CMV IgG/IgM Antibodies; Future    Bartonella anitbody panel; Future    Babesia microti antibody, IgG & Igm; Future    Other fatigue  Orders:    CBC and differential; Future    Comprehensive metabolic panel; Future    Lyme Total AB W Reflex to IGM/IGG; Future    DERIC Screen w/Reflex Cascade; Future    Rheumatoid Arthritis Factor; Future    C-reactive protein; Future    CMV IgG/IgM Antibodies; Future    Bartonella anitbody panel; Future    Babesia microti antibody, IgG & Igm; Future    Screening for malignant neoplasm of colon  Orders:    Cologuard    Tobacco abuse  Tobacco Cessation Counseling: Tobacco cessation counseling and education was provided. The patient is sincerely urged to quit consumption of tobacco. He is not ready to quit tobacco. The numerous health risks of tobacco consumption were discussed. If he decides to quit, there are a number of helpful adjunctive aids, and he can see me to discuss nicotine replacement therapy, chantix, or bupropion anytime in the future.. I spent 5 minutes on Tobacco Cessation counseling during today's visit.                 History of Present Illness   Pt here to establish care.   PMH, PSH, meds, allergies, SH, FH reviewed.   History: reviewed  FH: father- stroke. Mother was healthy  SH: lives with [place]mi, worked as title  "officer, recently laid off. Smoker, 5-7 cig/day, 40 years. Social etoh. Recreational marijuana.   Current medications: reviewed  Current issues include:   Was following with urology for hypogonadism, on testosterone injections  Having issues with shoulder. Following with ortho.   Left shoulder currently worse than right. Was diagnosed with bursitis  Pain both shoulders  Right elbow pain intermittently  Neck feels stiff  Lyme disease diagnosed in 2023. Was on multiple courses of antibiotics and anti-virals. Tick bite in November 2024 and neck pain started then  Had been seeing rheum for chronic lyme issues. Last seen spring 2024  Needs new urologist because prior does not take his insurance. Needs refill on testosterone until establishes with new urology group      Review of Systems   Constitutional:  Positive for fatigue. Negative for unexpected weight change.   HENT:  Negative for congestion, sinus pressure and sinus pain.    Eyes:  Negative for visual disturbance.   Respiratory:  Negative for cough, chest tightness and shortness of breath.    Cardiovascular:  Negative for chest pain, palpitations and leg swelling.   Gastrointestinal:  Negative for abdominal pain, diarrhea, nausea and vomiting.   Endocrine: Negative for polydipsia and polyuria.   Genitourinary:  Negative for dysuria and frequency.   Musculoskeletal:  Positive for arthralgias. Negative for myalgias.   Skin:  Negative for rash and wound.   Allergic/Immunologic: Negative for immunocompromised state.   Neurological:  Negative for dizziness, seizures and headaches.   Psychiatric/Behavioral:  Negative for dysphoric mood. The patient is not nervous/anxious.        Objective   /80   Pulse 73   Temp 97.6 °F (36.4 °C)   Resp 18   Ht 5' 5\" (1.651 m)   Wt 72.9 kg (160 lb 12.8 oz)   SpO2 97%   BMI 26.76 kg/m²      Physical Exam  Vitals reviewed.   Constitutional:       General: He is not in acute distress.     Appearance: He is not ill-appearing. "   Neck:      Vascular: No carotid bruit.   Cardiovascular:      Rate and Rhythm: Normal rate and regular rhythm.   Pulmonary:      Effort: Pulmonary effort is normal. No respiratory distress.      Breath sounds: Normal breath sounds. No wheezing.   Musculoskeletal:      Cervical back: Normal range of motion.   Skin:     General: Skin is warm and dry.      Coloration: Skin is not jaundiced or pale.   Neurological:      General: No focal deficit present.      Mental Status: He is alert and oriented to person, place, and time.      Cranial Nerves: No cranial nerve deficit.      Sensory: No sensory deficit.   Psychiatric:         Mood and Affect: Mood normal.         Behavior: Behavior normal.         Thought Content: Thought content normal.         Judgment: Judgment normal.

## 2025-01-29 ENCOUNTER — TELEPHONE (OUTPATIENT)
Age: 65
End: 2025-01-29

## 2025-01-29 DIAGNOSIS — M75.42 IMPINGEMENT SYNDROME OF LEFT SHOULDER: Primary | ICD-10-CM

## 2025-01-29 DIAGNOSIS — M75.22 BICEPS TENDINITIS OF LEFT SHOULDER: ICD-10-CM

## 2025-01-29 DIAGNOSIS — M75.41 IMPINGEMENT SYNDROME OF RIGHT SHOULDER: ICD-10-CM

## 2025-01-29 NOTE — TELEPHONE ENCOUNTER
Per insurance requirement, testosterone labs from within the last 6 months need to be submitted

## 2025-01-29 NOTE — TELEPHONE ENCOUNTER
PA DEPO-TESTOSTERONE 200 mg/mL PENDING    to [employer]    via    [x]CMM-KEY: [id number]  []Surescripts-Case ID #    []Availity-Auth ID #  NDC #    []Faxed to plan   []Other website    []Phone call Case ID #      []PA sent as URGENT    All office notes, labs and other pertaining documents and studies sent. Clinical questions answered. Awaiting determination from insurance company.     Turnaround time for your insurance to make a decision on your Prior Authorization can take 7-21 business days.

## 2025-01-30 ENCOUNTER — TELEPHONE (OUTPATIENT)
Age: 65
End: 2025-01-30

## 2025-01-30 NOTE — TELEPHONE ENCOUNTER
New Patient    Appointment Scheduling  What office location does the patient prefer?:[place]  What is the reason for the patient's appointment?:Hypogonadism  Have patient records been requested?:no  If No, are the records showing in Epic: yes    Appointment Details  Date:02/19/25  Time: 8:40  Location: [facility]  Provider:  Cody Forte PA-C  Does the appointment need further review?no (Reason)      HISTORY  Is the patient having active symptoms?no If so, describe symptoms:  Has the patient had any previous Urologist(s)?:[facility]  Was the patient seen in the ED?:no  Has the patient had any outside testing done?:yes  Does patient have Imaging/Lab Results:yes  Does the patient have a personal history of any cancer?:no    INSURANCE   Have you confirmed Patient's insurance? yes  Is the insurance accepted?  yes  Is the insurance active?yes

## 2025-02-03 ENCOUNTER — TELEPHONE (OUTPATIENT)
Dept: FAMILY MEDICINE CLINIC | Facility: CLINIC | Age: 65
End: 2025-02-03

## 2025-02-03 NOTE — TELEPHONE ENCOUNTER
----- Message from LIZA Gary sent at 2/3/2025  2:11 PM EST -----  Regarding: labs  Pt has upcoming appt for fu and lab review on 2/7.  Needs to have labs done prior to appt. Orders in chart.   Please call pt to advise.

## 2025-02-04 LAB
ALBUMIN SERPL-MCNC: 4.4 G/DL (ref 3.9–4.9)
ALP SERPL-CCNC: 69 IU/L (ref 44–121)
ALT SERPL-CCNC: 30 IU/L (ref 0–44)
ANA TITR SER IF: NEGATIVE {TITER}
AST SERPL-CCNC: 28 IU/L (ref 0–40)
B HENSELAE IGG TITR SER IF: NEGATIVE TITER
B HENSELAE IGM TITR SER IF: NEGATIVE TITER
B MICROTI IGG TITR SER: NORMAL {TITER}
B MICROTI IGM TITR SER: NORMAL {TITER}
B QUINTANA IGG TITR SER IF: NEGATIVE TITER
B QUINTANA IGM TITR SER IF: NEGATIVE TITER
BASOPHILS # BLD AUTO: 0.1 X10E3/UL (ref 0–0.2)
BASOPHILS NFR BLD AUTO: 1 %
BILIRUB SERPL-MCNC: 0.5 MG/DL (ref 0–1.2)
BUN SERPL-MCNC: 18 MG/DL (ref 8–27)
BUN/CREAT SERPL: 20 (ref 10–24)
CALCIUM SERPL-MCNC: 9.4 MG/DL (ref 8.6–10.2)
CHLORIDE SERPL-SCNC: 104 MMOL/L (ref 96–106)
CHOLEST SERPL-MCNC: 241 MG/DL (ref 100–199)
CHOLEST/HDLC SERPL: 2.9 RATIO (ref 0–5)
CMV IGG SERPL IA-ACNC: 5.4 U/ML (ref 0–0.59)
CMV IGM SERPL IA-ACNC: <30 AU/ML (ref 0–29.9)
CO2 SERPL-SCNC: 21 MMOL/L (ref 20–29)
COLOGUARD RESULT REPORTABLE: NORMAL
CREAT SERPL-MCNC: 0.9 MG/DL (ref 0.76–1.27)
CRP SERPL-MCNC: <1 MG/L (ref 0–10)
EGFR: 96 ML/MIN/1.73
EOSINOPHIL # BLD AUTO: 0.2 X10E3/UL (ref 0–0.4)
EOSINOPHIL NFR BLD AUTO: 2 %
ERYTHROCYTE [DISTWIDTH] IN BLOOD BY AUTOMATED COUNT: 12.3 % (ref 11.6–15.4)
EST. AVERAGE GLUCOSE BLD GHB EST-MCNC: 114 MG/DL
GLOBULIN SER-MCNC: 2.9 G/DL (ref 1.5–4.5)
GLUCOSE SERPL-MCNC: 111 MG/DL (ref 70–99)
HBA1C MFR BLD: 5.6 % (ref 4.8–5.6)
HCT VFR BLD AUTO: 47.5 % (ref 37.5–51)
HCV AB S/CO SERPL IA: NON REACTIVE
HDLC SERPL-MCNC: 83 MG/DL
HGB BLD-MCNC: 15.7 G/DL (ref 13–17.7)
HIV 1+2 AB+HIV1 P24 AG SERPL QL IA: NON REACTIVE
IMM GRANULOCYTES # BLD: 0 X10E3/UL (ref 0–0.1)
IMM GRANULOCYTES NFR BLD: 0 %
LABORATORY COMMENT REPORT: NORMAL
LDLC SERPL CALC-MCNC: 146 MG/DL (ref 0–99)
LYMPHOCYTES # BLD AUTO: 2.5 X10E3/UL (ref 0.7–3.1)
LYMPHOCYTES NFR BLD AUTO: 26 %
MCH RBC QN AUTO: 30.5 PG (ref 26.6–33)
MCHC RBC AUTO-ENTMCNC: 33.1 G/DL (ref 31.5–35.7)
MCV RBC AUTO: 92 FL (ref 79–97)
MONOCYTES # BLD AUTO: 0.6 X10E3/UL (ref 0.1–0.9)
MONOCYTES NFR BLD AUTO: 6 %
NEUTROPHILS # BLD AUTO: 6.3 X10E3/UL (ref 1.4–7)
NEUTROPHILS NFR BLD AUTO: 65 %
PLATELET # BLD AUTO: 268 X10E3/UL (ref 150–450)
POTASSIUM SERPL-SCNC: 4.7 MMOL/L (ref 3.5–5.2)
PROT SERPL-MCNC: 7.3 G/DL (ref 6–8.5)
PSA FREE MFR SERPL: 15.7 %
PSA FREE SERPL-MCNC: 1.08 NG/ML
PSA SERPL-MCNC: 6.9 NG/ML (ref 0–4)
RBC # BLD AUTO: 5.14 X10E6/UL (ref 4.14–5.8)
RESULT/COMMENT: NORMAL
RHEUMATOID FACT SERPL-ACNC: 17.9 IU/ML
SL AMB VLDL CHOLESTEROL CALC: 12 MG/DL (ref 5–40)
SODIUM SERPL-SCNC: 138 MMOL/L (ref 134–144)
TESTOST FREE SERPL-MCNC: 4.6 PG/ML (ref 6.6–18.1)
TESTOST SERPL-MCNC: 263 NG/DL (ref 264–916)
TRIGL SERPL-MCNC: 69 MG/DL (ref 0–149)
WBC # BLD AUTO: 9.8 X10E3/UL (ref 3.4–10.8)

## 2025-02-05 ENCOUNTER — EVALUATION (OUTPATIENT)
Dept: PHYSICAL THERAPY | Facility: CLINIC | Age: 65
End: 2025-02-05
Payer: COMMERCIAL

## 2025-02-05 DIAGNOSIS — M75.22 BICEPS TENDINITIS OF LEFT SHOULDER: ICD-10-CM

## 2025-02-05 DIAGNOSIS — M75.41 IMPINGEMENT SYNDROME OF RIGHT SHOULDER: ICD-10-CM

## 2025-02-05 DIAGNOSIS — M75.42 IMPINGEMENT SYNDROME OF LEFT SHOULDER: ICD-10-CM

## 2025-02-05 PROCEDURE — 97140 MANUAL THERAPY 1/> REGIONS: CPT

## 2025-02-05 PROCEDURE — 97162 PT EVAL MOD COMPLEX 30 MIN: CPT

## 2025-02-05 PROCEDURE — 97110 THERAPEUTIC EXERCISES: CPT

## 2025-02-05 NOTE — PROGRESS NOTES
PT Evaluation     Today's date: 2025  Patient name: Mary Russo  : 1960  MRN: [id number]  Referring provider: Storm Wing DO  Dx:   Encounter Diagnosis     ICD-10-CM    1. Impingement syndrome of left shoulder  M75.42 Ambulatory referral to Physical Therapy      2. Biceps tendinitis of left shoulder  M75.22 Ambulatory referral to Physical Therapy      3. Impingement syndrome of right shoulder  M75.41 Ambulatory referral to Physical Therapy                     Assessment  Impairments: abnormal or restricted ROM, abnormal movement, activity intolerance, impaired physical strength, lacks appropriate home exercise program, pain with function, poor posture  and poor body mechanics  Symptom irritability: moderate    Assessment details: Mary Russo is a 64 y.o. male who presents with neck and b/l shoulder pain, decreased strength, and decreased ROM of both the c/s and b/l shoulders. Most notable b/l ROM deficits present w/IR and flexion. Pt is limited to 135 AROM flexion on R and 125 on L. Pt experienced pain w/this, and all endd range ROM testing. IR is limited to 55 on R and 45 on L. MMT testing revealed a 3+ rating w/pain during L shoulder flexion. Pt was additionally positive w/ IR lag testing on L, Kang-Yo on L, and empty can testing on L. Following c/s retractions however, empty can testing became less painful. This decrease in pain during empty can testing only improved further w/ manual c/s retraction testing.  testing was also performed between c/s retraction intervention and saw an objective increase in consistency of L  strength as opposed to first 3 trials of testing. Pt was given c/s retractions and scapular adduction intervention for now, which will be updated NV based upon s/x presentation in order to achieve a more clear picture of the source of pain.     Due to these impairments, patient has difficulty performing ADL's, recreational activities, lifting/carrying,  reaching. Patient's clinical presentation is consistent with their referring diagnosis of Impingement syndrome of left shoulder  Plan: Ambulatory referral to Physical Therapy    Biceps tendinitis of left shoulder  Plan: Ambulatory referral to Physical Therapy    Impingement syndrome of right shoulder  Plan: Ambulatory referral to Physical Therapy    Patient has been educated in home exercise program and plan of care. Patient would benefit from skilled physical therapy services to address their aforementioned functional limitations and progress towards prior level of function and independence with home exercise program.     Understanding of Dx/Px/POC: excellent     Prognosis: good    Goals  Short Term Goals to be accomplished in 4 weeks:  STG1: Pt will be I with HEP to maximize progress between therapy sessions  STG2: Pt will be I with posture management   STG3: Pt will demo full b/l shoulder AROM to improve self care (showering) and household ADLs (yardwork).  STG4: Pt will demo 4 or 4+/5 MMT strength in shoulder flexion w/less than 2/10 pain  STG5: Pt will report pain <3/10 in shoulder flexion     Long Term Goals to be accomplished in 12 weeks:   LTG1: Pt will demo full cervical AROM to prevent impingement in L shoulder  LTG2: Pt will return to [facility] pain free as per PLOF   LTG3: Pt will demo shoulder strength WNL to allow lifting/carrying 20# above waist level      Plan  Patient would benefit from: PT eval and skilled physical therapy  Planned modality interventions: cryotherapy and thermotherapy: hydrocollator packs    Planned therapy interventions: home exercise program, therapeutic exercise, therapeutic activities, self care, patient education, manual therapy and neuromuscular re-education    Frequency: 2x week  Duration in weeks: 12  Plan of Care beginning date: 2/5/2025  Plan of Care expiration date: 4/30/2025  Treatment plan discussed with: patient  Plan details: HEP development, stretching,  "strengthening, A/AA/PROM, joint mobilizations, posture education, STM/MI as needed to reduce muscle tension, muscle reeducation, PLOC discussed and agreed upon with patient.          Subjective Evaluation    History of Present Illness  Date of onset: 2025  Mechanism of injury: 25:  Pt reports to therapy citing discomfort in his c/s. He currently feels no pain in the b/l shoulders. However he notes that his most painful instances concerning the b/l shouders presents upon waking up. At this time he rates his pain as \"5-6/10\" and adds that it \"feels like I just went through a tough workout, but I haven't.\" Pt adds that he enjoys recreational yard work in the form of trimming/cutting trees and chopping wood around his property in addition to typical property management. He adds that he is not able to do this to his PLOF due to his current pain. He adds that his L shoulder feels \"worse\" than his R in general, but most notably when moving into flexion and abduction. Pt's goal is to return to ADL's in the form of yardwork/landscaping pain free as PLOF.           Recurrent probem    Quality of life: good    Patient Goals  Patient goals for therapy: decreased pain, increased motion, return to sport/leisure activities, increased strength and independence with ADLs/IADLs    Pain  Current pain ratin  At best pain ratin  At worst pain ratin  Quality: dull ache, sharp and cramping  Alleviating factors: Cessation of activity.  Aggravating factors: overhead activity  Progression: worsening    Social Support  Steps to enter house: no  Stairs in house: yes   12  Lives in: one-story house    Employment status: not working  Hand dominance: right    Treatments  Previous treatment: injection treatment        Objective    Objective:     (*=Moderate Pain)  (**=Significantly Pain)    AROM: Thoracic Spine (25)    Extension Min restriction  Right Rotation Mod restriction      Left Rotation Min restriction  "          AROM: Standing  R (02/05/25) L (02/05/25)  Shoulder flexion  135*   125*  Shoulder abduction  145*   170*  Shoulder ER Functional C6*   C7-T1*  Shoulder IR Functional L2*   L2*  Shoulder ER@90  90*   85*  Shoulder IR   55*   45*     PROM: Supine  R (02/05/25) L (02/05/25)  Shoulder flexion  145*   130*  Shoulder abduction  155*   180*  Shoulder ER@90  90*   90*  Shoulder IR   60*   50*    STRENGTH:    R (02/05/25) L (02/05/25)    Shoulder flexion  4-/5*   3+/5**  Shoulder abduction  4/5   4-/5  Shoulder ER@90  4+/5   4/5  Shoulder IR   4+/5   4+/5  Upper Traps   5/5   5/5  Mid/Lower Traps  4-/5   4-/5      Posture: Pt's sitting posture is Mildly kyphotic w/moderate forward head posture.      Cervical Screen: Cervical AROM limitations  Flexion  Mod restriction (02/05/25)  Extension No restriction (02/05/25)  R rotation Min restriction (02/05/25)  L rotation Min restriction (02/05/25)  R sidebend No restriction (02/05/25)  L sidebend Min-mod restriction (02/05/25)  Retraction Min restriction (02/05/25)      Mechanical Assessment:   Repeated C/s retraction: 10x in sitting,  testing Trial 1, dec pain w/ empty can  Repeated C/s retraction: 10x in sitting,  testing Trial 2, dec pain w/ empty can  Repeated C/s retraction w/pt OP: 10x in sitting,  testing Trial 3, cont. dec pain w/ empty can      :    1: R 90, L 90  4: R 92, L 90  7: 95 R, 85 L  2: R 90, L 85  5: R 90, L85  8: 92 R, 82 L  3: R 90, L 75  6: R 90, L82   9: 90 R, 82 L    Special Tests:     Rotator Cuff: (02/05/25)  Painful arc: Positive b/l  Infraspinatus Lag: Positive on L  Empty can (supraspinatus): Negative on R, positive on L prior to c/s retractions    Impingment: (02/05/25)   Kang tia: Positive on L  Painful arc: Positive b/l  Neers: Positive on L         Precautions:   Past Medical History:   Diagnosis Date    Lyme disease            DOS N/A  SOC: 2/5/25  FOTO: 2/5/25  POC Expiration: 4/30/25  Last RE:N/A  Daily Treatment  Log:  Date 2/5/25       Visit # 1; IE       Auth         Auth exp        Manual 5'       C/s distraction in supine SJ                       There Exer 15'       Objective Measures  testing, c/s mechanical assessment, c/s and shoulder ROM, shoulder MMT       Scapular retraction 2x10 in sitting                                                       HEP Created/Given       There Activ                                                        NMReed 5'       C/s retractions                                        Modalities                                HEP:   Access Code: [email or web address]  URL: [email or web address]  Date: 02/05/2025  Prepared by: Garcia Gibbs    Exercises  - Seated Scapular Retraction  - 1-2 x daily - 7 x weekly - 1-2 sets - 10 reps  - Seated Passive Cervical Retraction  - 4-6 x daily - 7 x weekly - 1-2 sets - 10 reps - 1s hold

## 2025-02-06 ENCOUNTER — APPOINTMENT (OUTPATIENT)
Dept: PHYSICAL THERAPY | Facility: CLINIC | Age: 65
End: 2025-02-06
Payer: COMMERCIAL

## 2025-02-06 ENCOUNTER — TELEPHONE (OUTPATIENT)
Dept: PHYSICAL THERAPY | Facility: CLINIC | Age: 65
End: 2025-02-06

## 2025-02-06 NOTE — TELEPHONE ENCOUNTER
Called pt to discuss his desire to switch to 3x/week. Pt is going to contact his insurance company to see if they require a visit count or a time requirement spent in PT prior to scheduling an MRI.

## 2025-02-07 ENCOUNTER — OFFICE VISIT (OUTPATIENT)
Dept: PHYSICAL THERAPY | Facility: CLINIC | Age: 65
End: 2025-02-07
Payer: COMMERCIAL

## 2025-02-07 ENCOUNTER — OFFICE VISIT (OUTPATIENT)
Dept: FAMILY MEDICINE CLINIC | Facility: CLINIC | Age: 65
End: 2025-02-07
Payer: COMMERCIAL

## 2025-02-07 VITALS
WEIGHT: 161 LBS | BODY MASS INDEX: 26.82 KG/M2 | HEART RATE: 72 BPM | DIASTOLIC BLOOD PRESSURE: 82 MMHG | RESPIRATION RATE: 18 BRPM | HEIGHT: 65 IN | SYSTOLIC BLOOD PRESSURE: 122 MMHG | TEMPERATURE: 97.9 F | OXYGEN SATURATION: 98 %

## 2025-02-07 DIAGNOSIS — E29.1 HYPOGONADISM MALE: ICD-10-CM

## 2025-02-07 DIAGNOSIS — E78.2 MIXED HYPERLIPIDEMIA: ICD-10-CM

## 2025-02-07 DIAGNOSIS — R97.20 ELEVATED PSA: ICD-10-CM

## 2025-02-07 DIAGNOSIS — M75.42 IMPINGEMENT SYNDROME OF LEFT SHOULDER: Primary | ICD-10-CM

## 2025-02-07 DIAGNOSIS — Z00.00 ANNUAL PHYSICAL EXAM: ICD-10-CM

## 2025-02-07 DIAGNOSIS — M75.41 IMPINGEMENT SYNDROME OF RIGHT SHOULDER: ICD-10-CM

## 2025-02-07 DIAGNOSIS — M75.22 BICEPS TENDINITIS OF LEFT SHOULDER: ICD-10-CM

## 2025-02-07 DIAGNOSIS — R76.8 RHEUMATOID FACTOR POSITIVE: Primary | ICD-10-CM

## 2025-02-07 DIAGNOSIS — L98.9 SKIN LESION OF FACE: ICD-10-CM

## 2025-02-07 PROCEDURE — 99396 PREV VISIT EST AGE 40-64: CPT | Performed by: NURSE PRACTITIONER

## 2025-02-07 PROCEDURE — 97112 NEUROMUSCULAR REEDUCATION: CPT

## 2025-02-07 PROCEDURE — 97110 THERAPEUTIC EXERCISES: CPT

## 2025-02-07 PROCEDURE — 99214 OFFICE O/P EST MOD 30 MIN: CPT | Performed by: NURSE PRACTITIONER

## 2025-02-07 NOTE — PROGRESS NOTES
[facility] HEALTH MAINTENANCE OFFICE VISIT  [facility] - [facility]    NAME: Mary Russo  AGE: 64 y.o. SEX: male  : 1960     DATE: 2025    Assessment and Plan     1. Annual physical exam  Heart healthy, carbohydrate controlled diet- limit red meat, limit saturated fat, moderate salt intake, limit junk food, etc.   Regular exercise  Stress management  Routine labwork and screenings as ordered.       2. Rheumatoid factor positive (Primary)  - Ambulatory Referral to Rheumatology; Future    3. Elevated PSA  - Ambulatory Referral to Urology; Future    4. Mixed hyperlipidemia  Heart healthy diet. Monitor.     5. Hypogonadism male  - Ambulatory Referral to Urology; Future    6. Skin lesion of face  - Ambulatory Referral to Dermatology; Future      Patient Counseling:   Nutrition: Stressed importance of a well balanced diet, moderation of sodium/saturated fat, caloric balance and sufficient intake of fiber  Exercise: Stressed the importance of regular exercise with a goal of 150 minutes per week  Dental Health: Discussed daily flossing and brushing and regular dental visits     Immunizations reviewed: Risks and Benefits discussed  Discussed benefits of:  Colon Cancer Screening, Prostate Cancer Screening , and Screening labs.  BMI Counseling: Body mass index is 26.79 kg/m². Discussed with patient's BMI with him. The BMI is above normal. Nutrition recommendations include reducing intake of saturated fat and trans fat and reducing intake of cholesterol.          Chief Complaint     Chief Complaint   Patient presents with    Physical Exam       History of Present Illness     Here for annual exam, lab review, follow up  History of lyme with extended tx with rheum.   Still with multiple joint issues/pain  Following with ortho for shoulder issue. Getting PT.   Scheduled with urology for follow up on hypogonadism, 25  Has lesion on nose that would like to have checked.    Overdue for eye and dental exams  Exercises regularly.             Well Adult Physical   Patient here for a comprehensive physical exam.      Diet and Physical Activity  Diet: well balanced diet  Exercise: daily      Depression Screen  PHQ-2/9 Depression Screening    Little interest or pleasure in doing things: 0 - not at all  Feeling down, depressed, or hopeless: 0 - not at all  PHQ-2 Score: 0  PHQ-2 Interpretation: Negative depression screen          General Health  Hearing: Normal:  bilateral  Vision: no vision problems  Dental: no dental visits for >1 year          The following portions of the patient's history were reviewed and updated as appropriate: allergies, current medications, past family history, past medical history, past social history, past surgical history and problem list.    Review of Systems     Review of Systems   Constitutional:  Negative for chills, diaphoresis, fatigue and fever.   HENT:  Negative for congestion, sinus pressure, sinus pain and sore throat.    Eyes:  Negative for visual disturbance.   Respiratory:  Negative for cough, chest tightness, shortness of breath and wheezing.    Cardiovascular:  Negative for chest pain, palpitations and leg swelling.   Gastrointestinal:  Negative for abdominal distention, abdominal pain, diarrhea and nausea.   Endocrine: Negative for polydipsia and polyuria.   Genitourinary:  Negative for dysuria, frequency and urgency.   Musculoskeletal:  Positive for arthralgias. Negative for back pain and neck pain.   Skin:  Negative for rash.   Allergic/Immunologic: Negative for immunocompromised state.   Neurological:  Negative for dizziness, weakness and headaches.   Hematological:  Negative for adenopathy.   Psychiatric/Behavioral:  Negative for dysphoric mood. The patient is not nervous/anxious.        Past Medical History     Past Medical History:   Diagnosis Date    Lyme disease        Past Surgical History     Past Surgical History:   Procedure Laterality  "Date    BACK SURGERY         Social History     Social History     Socioeconomic History    Marital status: Single     Spouse name: None    Number of children: None    Years of education: None    Highest education level: None   Occupational History    None   Tobacco Use    Smoking status: Every Day     Types: Cigarettes     Passive exposure: Past    Smokeless tobacco: Never   Vaping Use    Vaping status: Never Used   Substance and Sexual Activity    Alcohol use: Yes    Drug use: Never    Sexual activity: None   Other Topics Concern    None   Social History Narrative    None     Social Drivers of Health     Financial Resource Strain: Not on file   Food Insecurity: Not on file   Transportation Needs: Not on file   Physical Activity: Not on file   Stress: Not on file   Social Connections: Not on file   Intimate Partner Violence: Not on file   Housing Stability: Not on file       Family History     Family History   Family history unknown: Yes       Current Medications       Current Outpatient Medications:     tadalafil (CIALIS) 5 MG tablet, Take 5 mg by mouth daily (Patient taking differently: Take 5 mg by mouth if needed), Disp: , Rfl:     B-D 3CC LUER-NATANAEL SYR 30ZE9-8/2 23G X 1-1/2\" 3 ML MISC, use 1 every week (Patient not taking: Reported on 2/7/2025), Disp: , Rfl:     BD Disp Needle 23G X 1\" MISC, use 1 every week (Patient not taking: Reported on 2/7/2025), Disp: , Rfl:     sildenafil (VIAGRA) 50 MG tablet, Take 50 mg by mouth (Patient not taking: Reported on 2/7/2025), Disp: , Rfl:     testosterone cypionate (DEPO-TESTOSTERONE) 200 mg/mL SOLN, Inject 0.5 mL (100 mg total) into a muscle every 7 days (Patient not taking: Reported on 2/7/2025), Disp: 10 mL, Rfl: 0    tinidazole (TINDAMAX) 500 MG tablet, Take 500 mg by mouth daily (Patient not taking: Reported on 8/15/2024), Disp: , Rfl:      Allergies     No Known Allergies    Objective     Recent Results (from the past 4 weeks)   CBC and differential    Collection " Time: 01/30/25 10:37 AM   Result Value Ref Range    White Blood Cell Count 9.8 3.4 - 10.8 x10E3/uL    Red Blood Cell Count 5.14 4.14 - 5.80 x10E6/uL    Hemoglobin 15.7 13.0 - 17.7 g/dL    HCT 47.5 37.5 - 51.0 %    MCV 92 79 - 97 fL    MCH 30.5 26.6 - 33.0 pg    MCHC 33.1 31.5 - 35.7 g/dL    RDW 12.3 11.6 - 15.4 %    Platelet Count 268 150 - 450 x10E3/uL    Neutrophils 65 Not Estab. %    Lymphocytes 26 Not Estab. %    Monocytes 6 Not Estab. %    Eosinophils 2 Not Estab. %    Basophils PCT 1 Not Estab. %    Neutrophils (Absolute) 6.3 1.4 - 7.0 x10E3/uL    Lymphocytes (Absolute) 2.5 0.7 - 3.1 x10E3/uL    Monocytes (Absolute) 0.6 0.1 - 0.9 x10E3/uL    Eosinophils (Absolute) 0.2 0.0 - 0.4 x10E3/uL    Basophils ABS 0.1 0.0 - 0.2 x10E3/uL    Immature Granulocytes 0 Not Estab. %    Immature Granulocytes (Absolute) 0.0 0.0 - 0.1 x10E3/uL   Comprehensive metabolic panel    Collection Time: 01/30/25 10:37 AM   Result Value Ref Range    Glucose, Random 111 (H) 70 - 99 mg/dL    BUN 18 8 - 27 mg/dL    Creatinine 0.90 0.76 - 1.27 mg/dL    eGFR 96 >59 mL/min/1.73    SL AMB BUN/CREATININE RATIO 20 10 - 24    Sodium 138 134 - 144 mmol/L    Potassium 4.7 3.5 - 5.2 mmol/L    Chloride 104 96 - 106 mmol/L    CO2 21 20 - 29 mmol/L    CALCIUM 9.4 8.6 - 10.2 mg/dL    Protein, Total 7.3 6.0 - 8.5 g/dL    Albumin 4.4 3.9 - 4.9 g/dL    Globulin, Total 2.9 1.5 - 4.5 g/dL    TOTAL BILIRUBIN 0.5 0.0 - 1.2 mg/dL    Alk Phos Isoenzymes 69 44 - 121 IU/L    AST 28 0 - 40 IU/L    ALT 30 0 - 44 IU/L   Hemoglobin A1C    Collection Time: 01/30/25 10:37 AM   Result Value Ref Range    Hemoglobin A1C 5.6 4.8 - 5.6 %    Estimated Average Glucose 114 mg/dL   Lipid panel    Collection Time: 01/30/25 10:37 AM   Result Value Ref Range    Cholesterol, Total 241 (H) 100 - 199 mg/dL    Triglycerides 69 0 - 149 mg/dL    HDL 83 >39 mg/dL    VLDL Cholesterol Calculated 12 5 - 40 mg/dL    LDL Calculated 146 (H) 0 - 99 mg/dL    T. Chol/HDL Ratio 2.9 0.0 - 5.0 ratio    Hepatitis C antibody    Collection Time: 01/30/25 10:37 AM   Result Value Ref Range    HEP C AB Non Reactive Non Reactive   HIV 1/2 AG/AB W REFLEX [facility] and [facility] only    Collection Time: 01/30/25 10:37 AM   Result Value Ref Range    HIV Screen 4th Generation wRflx Non Reactive Non Reactive   PSA, total and free    Collection Time: 01/30/25 10:37 AM   Result Value Ref Range    Prostate Specific Antigen Total 6.9 (H) 0.0 - 4.0 ng/mL    PSA, Free 1.08 N/A ng/mL    PSA, Free Pct 15.7 %   DERIC Screen w/Reflex Cascade    Collection Time: 01/30/25 10:37 AM   Result Value Ref Range    Antinuclear Antibodies, IFA Negative     Please note Comment    Rheumatoid Arthritis Factor    Collection Time: 01/30/25 10:37 AM   Result Value Ref Range    Rheumatoid Factor (RF) 17.9 (H) <14.0 IU/mL   C-reactive protein    Collection Time: 01/30/25 10:37 AM   Result Value Ref Range    C-Reactive Protein, Quant <1 0 - 10 mg/L   CMV IgG/IgM Antibodies    Collection Time: 01/30/25 10:37 AM   Result Value Ref Range    CMV IGG 5.40 (H) 0.00 - 0.59 U/mL    CMV IgM <30.0 0.0 - 29.9 AU/mL   Bartonella anitbody panel    Collection Time: 01/30/25 10:37 AM   Result Value Ref Range    B henselae IgG Negative Neg:<1:320 titer    B henselae IgM Negative Neg:<1:100 titer    B wu IgG Negative Neg:<1:320 titer    B Wu IgM Negative Neg:<1:100 titer   Babesia microti antibody, IgG & Igm    Collection Time: 01/30/25 10:37 AM   Result Value Ref Range    Babesia microti IgG <1:10 Neg:<1:10    Babesia microti IgM <1:10 Neg:<1:10    RESULT/COMMENT Comment    Testosterone, free, total    Collection Time: 01/30/25 10:37 AM   Result Value Ref Range    TESTOSTERONE TOTAL 263 (L) 264 - 916 ng/dL    Testosterone, Free 4.6 (L) 6.6 - 18.1 pg/mL   Cologuard    Collection Time: 01/31/25  8:45 AM   Result Value Ref Range    Cologuard Result Sample Could Not Be Processed N/A   COLOGUARD    Collection Time: 01/31/25  8:45 AM   Result Value Ref Range     "Cologuard Result Sample Could Not Be Processed 3 N/A     Reviewed lab/diagnostic results with pt including both normal and abnormal findings.   In depth counseling and instructions given. All questions answered during visit.     /82   Pulse 72   Temp 97.9 °F (36.6 °C)   Resp 18   Ht 5' 5\" (1.651 m)   Wt 73 kg (161 lb)   SpO2 98%   BMI 26.79 kg/m²      Physical Exam  Vitals reviewed.   Constitutional:       General: He is not in acute distress.     Appearance: Normal appearance. He is well-developed. He is not ill-appearing.   HENT:      Head: Normocephalic and atraumatic.      Right Ear: Tympanic membrane and ear canal normal.      Left Ear: Tympanic membrane and ear canal normal.      Nose: Nose normal.      Mouth/Throat:      Mouth: Mucous membranes are moist.      Pharynx: Oropharynx is clear.   Eyes:      General: No scleral icterus.     Extraocular Movements: Extraocular movements intact.      Pupils: Pupils are equal, round, and reactive to light.   Neck:      Thyroid: No thyromegaly.      Vascular: No carotid bruit.   Cardiovascular:      Rate and Rhythm: Normal rate and regular rhythm.      Heart sounds: No murmur heard.  Pulmonary:      Effort: Pulmonary effort is normal. No respiratory distress.      Breath sounds: Normal breath sounds. No wheezing or rales.   Abdominal:      General: Bowel sounds are normal. There is no distension.      Palpations: Abdomen is soft.      Tenderness: There is no abdominal tenderness.   Musculoskeletal:         General: Normal range of motion.      Cervical back: Normal range of motion and neck supple.      Right lower leg: No edema.      Left lower leg: No edema.   Lymphadenopathy:      Cervical: No cervical adenopathy.   Skin:     General: Skin is warm and dry.      Coloration: Skin is not jaundiced or pale.      Comments: Raised tan lesion to external nose , approx 0.25 cm diameter   Neurological:      General: No focal deficit present.      Mental Status: He " is alert and oriented to person, place, and time.      Cranial Nerves: No cranial nerve deficit.      Sensory: No sensory deficit.      Coordination: Coordination normal.      Gait: Gait normal.   Psychiatric:         Mood and Affect: Mood normal.         Behavior: Behavior normal.         Thought Content: Thought content normal.         Judgment: Judgment normal.           Vision Screening    Right eye Left eye Both eyes   Without correction 20/30 20/30 20/30   With correction              LIZA Gary  [facility]

## 2025-02-07 NOTE — PATIENT INSTRUCTIONS
Keep appt with urology   Schedule appt with rheumatology  Heart healthy, carbohydrate controlled diet- limit red meat, limit saturated fat, moderate salt intake, limit junk food, etc.   Regular exercise  Stress management  Routine labwork and screenings as ordered.   Cologuard needs to be repeated. Sample could not be processed.   Schedule appt with dermatology for skin check

## 2025-02-07 NOTE — PROGRESS NOTES
Daily Note     Today's date: 2025  Patient name: Mary Russo  : 1960  MRN: [id number]  Referring provider: Storm Wing DO  Dx:   Encounter Diagnosis     ICD-10-CM    1. Impingement syndrome of left shoulder  M75.42       2. Biceps tendinitis of left shoulder  M75.22       3. Impingement syndrome of right shoulder  M75.41                      Subjective: Pt reports to therapy citing 5/10 pain in the b/l shoulders when approaching 90 deg of flexion and or abduction. Pt notes he woke up w/less exhaustion and b/l shoulder pain than he is used to, but maintains that active pain remains.       Objective: See treatment diary below      Assessment: Tolerated treatment well. Pt was able to reduce b/l shoulder pain while objectively increasing abduction/flexion ROM following implementation of scapular stabilization and t/s extension interventions. HEP was updated to include these changes including TB rows, prone I, SPC IR stretch, wall slides and serratus punches. Patient would benefit from continued PT to increase t/s and b/l shoulder mobility while enhancing scapular stabilization.       Plan: Continue per plan of care.      Precautions:   Past Medical History:   Diagnosis Date    Lyme disease            DOS N/A  SOC: 25  FOTO: 25  POC Expiration: 25  Last RE:N/A  Daily Treatment Log:  Date 25      Visit # 1; IE 2.      Auth        Auth exp        Manual 5' 5'      C/s distraction in supine SJ       PA Mobs T1-T7 grades 3-4  SJ              There Exer 15' 32'      Objective Measures  testing, c/s mechanical assessment, c/s and shoulder ROM, shoulder MMT       Scapular retraction 2x10 in sitting 20x following wall slides & SPC IR      HL SPC pullover   2x10 w/ 1.5#      SPC IR stretch behind the back  2x10 w/Wall slides      Wall slides   2x10      ROSHNI rows  2x10 w/15# and 3s hold                              HEP Created/Given Updated/Reiewed/Given      There  Activ                                                        NMReed 5' 8'      C/s retractions  2x10 t/o session      Prone I  2x10                               Modalities                                Access Code: [email or web address]  URL: [email or web address]  Date: 02/07/2025  Prepared by: Garcia Gibbs    Exercises  - Seated Passive Cervical Retraction  - 4-6 x daily - 7 x weekly - 1-2 sets - 10 reps - 1s hold  - Standing Bilateral Shoulder Internal Rotation AAROM with Dowel  - 1-2 x daily - 7 x weekly - 2 sets - 10 reps  - Shoulder Flexion Wall Slide with Towel  - 1-2 x daily - 7 x weekly - 2 sets - 10 reps  - Standing Bilateral Low Shoulder Row with Anchored Resistance  - 1-2 x daily - 7 x weekly - 2 sets - 10 reps - 1-3s hold  - Prone Scapular Slide with Shoulder Extension  - 1-2 x daily - 7 x weekly - 2 sets - 10 reps - 1-3s hold  - Supine Serratus Punch with Dumbbells  - 1-2 x daily - 7 x weekly - 2 sets - 10 reps

## 2025-02-13 ENCOUNTER — OFFICE VISIT (OUTPATIENT)
Dept: PHYSICAL THERAPY | Facility: CLINIC | Age: 65
End: 2025-02-13
Payer: COMMERCIAL

## 2025-02-13 DIAGNOSIS — M75.42 IMPINGEMENT SYNDROME OF LEFT SHOULDER: Primary | ICD-10-CM

## 2025-02-13 DIAGNOSIS — M75.41 IMPINGEMENT SYNDROME OF RIGHT SHOULDER: ICD-10-CM

## 2025-02-13 DIAGNOSIS — M75.22 BICEPS TENDINITIS OF LEFT SHOULDER: ICD-10-CM

## 2025-02-13 PROCEDURE — 97112 NEUROMUSCULAR REEDUCATION: CPT

## 2025-02-13 PROCEDURE — 97110 THERAPEUTIC EXERCISES: CPT

## 2025-02-13 NOTE — PROGRESS NOTES
"Daily Note     Today's date: 2025  Patient name: Mary Russo  : 1960  MRN: [id number]  Referring provider: Storm Wing DO  Dx:   Encounter Diagnosis     ICD-10-CM    1. Impingement syndrome of left shoulder  M75.42       2. Biceps tendinitis of left shoulder  M75.22       3. Impingement syndrome of right shoulder  M75.41                      Subjective: Pt reports to therapy citing his typical 6/10 b/l shoulder pain upon waking up this morning. He adds that he is ablke to feel \"loose\" following performance of HEP, but notes increased pain later in the day when using the shoulders to reach beyond 90 deg, I.e grabbing a coffee cup from a shelf.      Objective: See treatment diary below      Assessment: Tolerated treatment well. Pt demonstrated lower pain levels when performing interventions to, but not through the level of pain. Pt is following up w/Rheumatology via PCP but this is not taking place until  per pt. Today's session saw an increased focus on t/s mobility as pt found s/x relief w/PT assist of scapular upward rotation during shoulder flexion.  Patient would benefit from continued PT      Plan: Continue per plan of care.      Precautions:   Past Medical History:   Diagnosis Date    Lyme disease            DOS N/A  SOC: 25  FOTO: 25  POC Expiration: 25  Last RE:N/A  Daily Treatment Log:  Date 25     Visit # 1; IE 2. 3     Auth  1/12 2/12 3/12     Auth exp        Manual 5' 5' 5'     C/s distraction in supine SJ       PA Mobs T1-T7 grades 3-4  SJ SJ     CTJ Grade 5   SJ             There Exer 15' 32' 20'     Objective Measures  testing, c/s mechanical assessment, c/s and shoulder ROM, shoulder MMT       Scapular retraction 2x10 in sitting 20x following wall slides & SPC IR 20x following wall slides & SPC IR     HL SPC pullover   2x10 w/ 1.5# 10x-painful today     SPC IR stretch behind the back  2x10 w/Wall slides 2x10 w/Wall slides     Wall slides "   2x10 2x10      ROSHNI rows  2x10 w/15# and 3s hold                              HEP Created/Given Updated/Reiewed/Given Updated/Reiewed/Given     There Activ                                                        NMReed 5'  20'     C/s retractions   2x10 t/o session     Prone I   2x10     Cat/Cow    2x10     Qped t/s rotation   2x10     Open Books   2x10; arms crossed if painful on L anterior delt     Modalities                                Access Code: [email or web address]  URL: [email or web address]  Date: 02/07/2025  Prepared by: Garcia Gibbs    Exercises  - Seated Passive Cervical Retraction  - 4-6 x daily - 7 x weekly - 1-2 sets - 10 reps - 1s hold  - Standing Bilateral Shoulder Internal Rotation AAROM with Dowel  - 1-2 x daily - 7 x weekly - 2 sets - 10 reps  - Shoulder Flexion Wall Slide with Towel  - 1-2 x daily - 7 x weekly - 2 sets - 10 reps  - Standing Bilateral Low Shoulder Row with Anchored Resistance  - 1-2 x daily - 7 x weekly - 2 sets - 10 reps - 1-3s hold  - Prone Scapular Slide with Shoulder Extension  - 1-2 x daily - 7 x weekly - 2 sets - 10 reps - 1-3s hold  - Supine Serratus Punch with Dumbbells  - 1-2 x daily - 7 x weekly - 2 sets - 10 reps

## 2025-02-14 ENCOUNTER — OFFICE VISIT (OUTPATIENT)
Dept: PHYSICAL THERAPY | Facility: CLINIC | Age: 65
End: 2025-02-14
Payer: COMMERCIAL

## 2025-02-14 DIAGNOSIS — M75.42 IMPINGEMENT SYNDROME OF LEFT SHOULDER: Primary | ICD-10-CM

## 2025-02-14 DIAGNOSIS — M75.22 BICEPS TENDINITIS OF LEFT SHOULDER: ICD-10-CM

## 2025-02-14 DIAGNOSIS — M75.41 IMPINGEMENT SYNDROME OF RIGHT SHOULDER: ICD-10-CM

## 2025-02-14 PROCEDURE — 97112 NEUROMUSCULAR REEDUCATION: CPT

## 2025-02-14 PROCEDURE — 97110 THERAPEUTIC EXERCISES: CPT

## 2025-02-14 NOTE — PROGRESS NOTES
"Daily Note     Today's date: 2025  Patient name: Mary Russo  : 1960  MRN: [id number]  Referring provider: Storm Wing DO  Dx:   Encounter Diagnosis     ICD-10-CM    1. Impingement syndrome of left shoulder  M75.42       2. Biceps tendinitis of left shoulder  M75.22       3. Impingement syndrome of right shoulder  M75.41                      Subjective: Pt entered today's session stating that he \"feels better\" than when he arrived yesterday morning for PT. Pt unable to provide numerical pain rating today. He adds that increased mid back stretching has been painless to perform and potentially helpful to this point.      Objective: See treatment diary below      Assessment: Tolerated treatment well. Pt found increase L shoulder flexion following L AC jt manipulation. Pt additionally found s/x relief w/performance of chest stretch and lacrosse ball rolling. Chest stretching caused minor anterior shoulder pain on L which was then relieved w/ ISO ER using SPC following. Patient would benefit from continued PT      Plan: Continue per plan of care.      Precautions:   Past Medical History:   Diagnosis Date    Lyme disease            DOS N/A  SOC: 25  FOTO: 25  POC Expiration: 25  Last RE:N/A  Daily Treatment Log:  Date 25    Visit # 1; IE 2. 3 4    Auth  1/12 2/12 3/12 4/12    Auth exp        Manual 5' 5' 5' 5'    C/s distraction in supine SJ       PA Mobs T1-T7 grades 3-4  SJ SJ SJ    CTJ Grade 5   SJ     AC Jt mob Grade 5    SJ            There Exer 15' 32' 20' 25'    Objective Measures  testing, c/s mechanical assessment, c/s and shoulder ROM, shoulder MMT       Scapular retraction 2x10 in sitting 20x following wall slides & SPC IR 20x following wall slides & SPC IR 20x following wall slides & SPC IR    HL SPC pullover   2x10 w/ 1.5# 10x-painful today 2x10 w/ hands neutral and using BW    SPC IR stretch behind the back  2x10 w/Wall slides 2x10 w/Wall " slides 2x 30s R/L    Wall slides   2x10 2x10  2x10     ROSHNI rows  2x10 w/15# and 3s hold      Box Drawing Supine    10x R/L                    HEP Created/Given Updated/Reiewed/Given Updated/Reiewed/Given Updated/Reviewed    There Activ                                                        NMReed 5'  20' 20'    C/s retractions   2x10 t/o session     Prone I   2x10 2x10    Cat/Cow    2x10 2x10    Qped t/s rotation   2x10 2x10 R/L    Open Books   2x10; arms crossed if painful on L anterior delt 2x10; arms crossed if painful on L anterior delt    SOS IR stretch    30sx2 R/L                    Modalities                                Access Code: [email or web address]  URL: [email or web address]  Date: 02/14/2025  Prepared by: Garcia Gibbs    Exercises  - Seated Passive Cervical Retraction  - 4-6 x daily - 7 x weekly - 1-2 sets - 10 reps - 1s hold  - Standing Bilateral Shoulder Internal Rotation AAROM with Dowel  - 1-2 x daily - 7 x weekly - 2 sets - 10 reps  - Shoulder Flexion Wall Slide with Towel  - 1-2 x daily - 7 x weekly - 2 sets - 10 reps  - Prone Scapular Slide with Shoulder Extension  - 1-2 x daily - 7 x weekly - 2 sets - 10 reps - 1-3s hold  - Standing Bilateral Low Shoulder Row with Anchored Resistance  - 1-2 x daily - 7 x weekly - 2 sets - 10 reps - 1-3s hold  - Standing Thoracic Open Book at Wall  - 1-2 x daily - 7 x weekly - 2 sets - 10 reps  - Cat Cow  - 1-2 x daily - 7 x weekly - 2 sets - 10 reps  - Standing Shoulder External Rotation AAROM with Dowel  - 1-2 x daily - 7 x weekly - 2 sets - 10 reps  - Quadruped Thoracic Rotation Full Range with Hand on Neck  - 1-2 x daily - 7 x weekly - 2 sets - 10 reps

## 2025-02-19 LAB — COLOGUARD RESULT REPORTABLE: NEGATIVE

## 2025-02-20 ENCOUNTER — TELEPHONE (OUTPATIENT)
Dept: PHYSICAL THERAPY | Facility: CLINIC | Age: 65
End: 2025-02-20

## 2025-02-20 ENCOUNTER — APPOINTMENT (OUTPATIENT)
Dept: PHYSICAL THERAPY | Facility: CLINIC | Age: 65
End: 2025-02-20
Payer: COMMERCIAL

## 2025-02-20 ENCOUNTER — RESULTS FOLLOW-UP (OUTPATIENT)
Dept: FAMILY MEDICINE CLINIC | Facility: CLINIC | Age: 65
End: 2025-02-20

## 2025-02-20 NOTE — TELEPHONE ENCOUNTER
Pt canceled today's appt due to feeling unwell, but wants to keep tomorrow's appt for now, hoping to feel better.

## 2025-02-21 ENCOUNTER — APPOINTMENT (OUTPATIENT)
Dept: PHYSICAL THERAPY | Facility: CLINIC | Age: 65
End: 2025-02-21
Payer: COMMERCIAL

## 2025-02-27 ENCOUNTER — OFFICE VISIT (OUTPATIENT)
Dept: PHYSICAL THERAPY | Facility: CLINIC | Age: 65
End: 2025-02-27
Payer: COMMERCIAL

## 2025-02-27 DIAGNOSIS — M75.41 IMPINGEMENT SYNDROME OF RIGHT SHOULDER: ICD-10-CM

## 2025-02-27 DIAGNOSIS — M75.42 IMPINGEMENT SYNDROME OF LEFT SHOULDER: Primary | ICD-10-CM

## 2025-02-27 DIAGNOSIS — M75.22 BICEPS TENDINITIS OF LEFT SHOULDER: ICD-10-CM

## 2025-02-27 PROCEDURE — 97112 NEUROMUSCULAR REEDUCATION: CPT

## 2025-02-27 PROCEDURE — 97110 THERAPEUTIC EXERCISES: CPT

## 2025-02-27 NOTE — PROGRESS NOTES
"Daily Note     Today's date: 2025  Patient name: Mary Russo  : 1960  MRN: [id number]  Referring provider: Storm Wing DO  Dx:   Encounter Diagnosis     ICD-10-CM    1. Impingement syndrome of left shoulder  M75.42       2. Biceps tendinitis of left shoulder  M75.22       3. Impingement syndrome of right shoulder  M75.41                      Subjective: Pt reports to therapy citing his \"typical\" 5-6/10 pain. This pain is still most present in the morning and he maintains that the L>R in regards to pain. He adds that over the past 7-10 days he has not been compliant w/HEP due to recent illness, but notes his shoulders feel \"about the same\".       Objective: See treatment diary below      Assessment: Tolerated treatment well. Pt's session today saw an increased focus on T/s mobility and GHJ mobility in an effort to reduce pain w/AROM abduction and flexion. Combination of SPC IR stretch w/wall slides led to an objective increase in AROM abduction w/decreased pain rated as 3/10 at the time. Pt additionally found more relief practicing pec stretching in the form of wall slides w/scapular adduction and corner stretching. Patient would benefit from continued PT      Plan: Continue per plan of care.      Precautions:   Past Medical History:   Diagnosis Date    Lyme disease            DOS N/A  SOC: 25  FOTO: 25  POC Expiration: 25  Last RE:N/A  Daily Treatment Log:  Date  25   Visit #  2. 3 4 5; FOTO   Auth   2/12 3/12 4/12 5/12   Auth exp        Manual  5' 5' 5' 5'   C/s distraction in supine        PA Mobs T1-T7 grades 3-4  SJ SJ SJ SJ   CTJ Grade 5   SJ     AC Jt mob Grade 5 on R    SJ            There Exer  32' 20' 25' 15'   Objective Measures        Scapular retraction  20x following wall slides & SPC IR 20x following wall slides & SPC IR 20x following wall slides & SPC IR 20x following wall slides & SPC IR   HL SPC pullover   2x10 w/ 1.5# 10x-painful " today 2x10 w/ hands neutral and using BW    SPC IR stretch behind the back  2x10 w/Wall slides 2x10 w/Wall slides 2x 30s R/L 2x 30s R/L   Wall slides   2x10 2x10  2x10  2x10    ROSHNI rows  2x10 w/15# and 3s hold      Box Drawing Supine    10x R/L 10x R/L                   HEP Created/Given Updated/Reiewed/Given Updated/Reiewed/Given Updated/Reviewed    There Activ                                                        NMReed 5'  20' 20' 25'   C/s retractions   2x10 t/o session     Prone I   2x10 2x10 2x10   Cat/Cow    2x10 2x10 2x10   Qped t/s rotation   2x10 2x10 R/L 2x10 R/L   Open Books   2x10; arms crossed if painful on L anterior delt 2x10; arms crossed if painful on L anterior delt 2x10 arms crossed    SOS IR stretch    30sx2 R/L    Corner chest stretch      10x5s-Trial   B/L shoulder extension stretch w/TB     Blue TB, 10s, 20s, 30s holds           Modalities                                Access Code: [email or web address]  URL: [email or web address]  Date: 02/27/2025  Prepared by: Garcia Gibbs    Exercises  - Cat Cow  - 1-2 x daily - 7 x weekly - 2 sets - 10 reps  - Standing Thoracic Open Book at Wall  - 1-2 x daily - 7 x weekly - 2 sets - 10 reps  - Quadruped Thoracic Rotation Full Range with Hand on Neck  - 1-2 x daily - 7 x weekly - 2 sets - 10 reps  - Standing Bilateral Shoulder Internal Rotation AAROM with Dowel  - 1-2 x daily - 7 x weekly - 2 sets - 10 reps  - Shoulder Flexion Wall Slide with Towel  - 1-2 x daily - 7 x weekly - 2 sets - 10 reps  - Doorway Pec Stretch at 60 Degrees Abduction with Arm Straight  - 1 x daily - 7 x weekly - 2 sets - 10 reps - 10s hold  - Doorway Pec Stretch at 90 Degrees Abduction  - 1 x daily - 7 x weekly - 2 sets - 10 reps - 10s hold  - Standing Upper Trapezius Stretch  - 1 x daily - 7 x weekly - 2 sets - 3 reps - 30s hold  - Standing Shoulder Extension ROM with Dowel  - 1 x daily - 7 x weekly - 2 sets - 10 reps - 10s hold  - Prone Scapular Slide with Shoulder Extension   - 1-2 x daily - 7 x weekly - 2 sets - 10 reps - 1-3s hold  - Standing Bilateral Low Shoulder Row with Anchored Resistance  - 1-2 x daily - 7 x weekly - 2 sets - 10 reps - 1-3s hold

## 2025-02-28 ENCOUNTER — OFFICE VISIT (OUTPATIENT)
Dept: PHYSICAL THERAPY | Facility: CLINIC | Age: 65
End: 2025-02-28
Payer: COMMERCIAL

## 2025-02-28 DIAGNOSIS — M75.42 IMPINGEMENT SYNDROME OF LEFT SHOULDER: Primary | ICD-10-CM

## 2025-02-28 DIAGNOSIS — M75.22 BICEPS TENDINITIS OF LEFT SHOULDER: ICD-10-CM

## 2025-02-28 DIAGNOSIS — M75.41 IMPINGEMENT SYNDROME OF RIGHT SHOULDER: ICD-10-CM

## 2025-02-28 PROCEDURE — 97110 THERAPEUTIC EXERCISES: CPT

## 2025-02-28 PROCEDURE — 97112 NEUROMUSCULAR REEDUCATION: CPT

## 2025-02-28 NOTE — PROGRESS NOTES
"Daily Note     Today's date: 2025  Patient name: Mary Russo  : 1960  MRN: [id number]  Referring provider: Storm Wing DO  Dx:   Encounter Diagnosis     ICD-10-CM    1. Impingement syndrome of left shoulder  M75.42       2. Biceps tendinitis of left shoulder  M75.22       3. Impingement syndrome of right shoulder  M75.41                      Subjective: Pt reports to therapy stating that he \"felt great\" following yesterday's session. He adds that he woke up today w/ his \"typical\" b/l shoulder pain, first noticed when reaching for a coffee cup in a cabinet.       Objective: See treatment diary below      Assessment: Tolerated treatment well. Pt continues to find pain relief and increase b/l shoulder AROM following t/s mobility interventions including cat cow and Qped t/rot. Chest stretching in the form of door way stretch and wall slides additionally increase b/l shoulder AROM while reducing pain.  Patient would benefit from continued PT      Plan: Continue per plan of care.      Precautions:   Past Medical History:   Diagnosis Date    Lyme disease            DOS N/A  SOC: 25  FOTO: 25  POC Expiration: 25  Last RE:N/A  Daily Treatment Log:  Date 25   Visit # 6 2. 3 4 5; FOTO   Auth  6/12 2/12 3/12 4/12 5/12   Auth exp        Manual  5' 5' 5' 5'   C/s distraction in supine        PA Mobs T1-T7 grades 3-4  SJ SJ SJ SJ   CTJ Grade 5   SJ     AC Jt mob Grade 5 on R    SJ            There Exer 25' 32' 20' 25' 15'   Objective Measures        Scapular retraction  20x following wall slides & SPC IR 20x following wall slides & SPC IR 20x following wall slides & SPC IR 20x following wall slides & SPC IR   HL SPC pullover   2x10 w/ 1.5# 10x-painful today 2x10 w/ hands neutral and using BW    ER at 90 deg w/TB 2x10 w/ YTB       SPC IR stretch behind the back W/SOS today; 5x20s R/L-d/c pain; went back to SPC; [id number] holds 2x10 w/Wall slides 2x10 w/Wall " slides 2x 30s R/L 2x 30s R/L   Wall slides  2x10 2x10 2x10  2x10  2x10    ROSHNI rows  2x10 w/15# and 3s hold      Box Drawing Supine 10x R/L   10x R/L 10x R/L   AAROM abduction w/eccentric lowering b/l 2x10 on R-painful on L       SL Abduction Performed on L; 2x10 w/BW       HEP Updated Updated/Reiewed/Given Updated/Reiewed/Given Updated/Reviewed    There Activ                                                        NMReed 20'  20' 20' 25'   C/s retractions   2x10 t/o session     Prone I   2x10 2x10 2x10   Cat/Cow  20x on plinth  2x10 2x10 2x10   Qped t/s rotation 20x on plinth  2x10 2x10 R/L 2x10 R/L   Open Books   2x10; arms crossed if painful on L anterior delt 2x10; arms crossed if painful on L anterior delt 2x10 arms crossed    SOS IR stretch    30sx2 R/L    Corner chest stretch  10x10s    10x5s-Trial   B/L shoulder extension stretch w/TB 3x20s B/L    Blue TB, 10s, 20s, 30s holds           Modalities                                Access Code: [email or web address]  URL: [email or web address]  Date: 02/28/2025  Prepared by: Garcia Gibbs    Exercises  - Cat Cow  - 1-2 x daily - 7 x weekly - 2 sets - 10 reps  - Standing Thoracic Open Book at Wall  - 1-2 x daily - 7 x weekly - 2 sets - 10 reps  - Quadruped Thoracic Rotation Full Range with Hand on Neck  - 1-2 x daily - 7 x weekly - 2 sets - 10 reps  - Standing Bilateral Shoulder Internal Rotation AAROM with Dowel  - 1-2 x daily - 7 x weekly - 2 sets - 10 reps  - Shoulder Flexion Wall Slide with Towel  - 1-2 x daily - 7 x weekly - 2 sets - 10 reps  - Doorway Pec Stretch at 60 Degrees Abduction with Arm Straight  - 1 x daily - 7 x weekly - 2 sets - 10 reps - 10s hold  - Standing Upper Trapezius Stretch  - 1 x daily - 7 x weekly - 2 sets - 3 reps - 30s hold  - Standing Shoulder Extension ROM with Dowel  - 1 x daily - 7 x weekly - 2 sets - 10 reps - 10s hold  - Prone Scapular Slide with Shoulder Extension  - 1-2 x daily - 7 x weekly - 2 sets - 10 reps - 1-3s hold  -  Standing Bilateral Low Shoulder Row with Anchored Resistance  - 1-2 x daily - 7 x weekly - 2 sets - 10 reps - 1-3s hold  - Sidelying Shoulder Abduction Full Range of Motion  - 1 x daily - 7 x weekly - 2 sets - 10 reps

## 2025-03-04 ENCOUNTER — OFFICE VISIT (OUTPATIENT)
Dept: UROLOGY | Facility: CLINIC | Age: 65
End: 2025-03-04
Payer: COMMERCIAL

## 2025-03-04 VITALS
HEIGHT: 65 IN | SYSTOLIC BLOOD PRESSURE: 140 MMHG | DIASTOLIC BLOOD PRESSURE: 80 MMHG | OXYGEN SATURATION: 99 % | BODY MASS INDEX: 27.16 KG/M2 | HEART RATE: 77 BPM | WEIGHT: 163 LBS

## 2025-03-04 DIAGNOSIS — E29.1 HYPOGONADISM MALE: ICD-10-CM

## 2025-03-04 DIAGNOSIS — R97.20 ELEVATED PSA: ICD-10-CM

## 2025-03-04 PROCEDURE — 99204 OFFICE O/P NEW MOD 45 MIN: CPT | Performed by: UROLOGY

## 2025-03-04 NOTE — PROGRESS NOTES
Mary Russo is a(n) 64 y.o. male. , :  1960    Subjective     Assessment:  Diagnoses of Elevated PSA and Hypogonadism male were pertinent to this visit.  64-year-old gentleman with symptoms of hypogonadism.  Previously seen at [facility] urology.  Appears May 2024 he was on testosterone cypionate 100 mg IM weekly.  Also had ED.  Was interested in fertility because his significant other was in her 40s.  Stopped replacement in 2024 and started Clomid every other day. Lyme's specialist put him on TRT . Stopped due to insurance. Wants to start again.  His most recent testosterone was obtained by primary care.  His total testosterone was 263 which is below normal.  His free testosterone was 4.6 which is also low.  He is again symptomatic.  I did explain that with an elevated PSA he probably should undergo either a prostate biopsy or a prostate MRI.  He favors a biopsy rather than delaying to get an MRI which may or may not suggest cancer.  He is aware that only a biopsy is definitive for cancer.  The MRI is only suggestive.  He is aware that we do not replace testosterone in our clinic.  We generally refer people to endocrinology.    Plan: Refer to endocrine for TRT but wait until biopsy negative.  Schedule TRUS PNB.  The risks and benefits of prostate biopsy were discussed with the patient.  Bleeding, infection, sepsis can occur.  We will give him IM antibiotic prior to the procedure.  He should refrain from blood thinners prior to the procedure.     Radiology  none    Past Medical History  Lyme disease  Hyperlipidemia    Past  History  Hypogonadism  Erectile dysfunction    Past  surgical history   none    Prior Visits  24 [facility] urology  Initial visit. Hx of low T on T cypionate 100 mg IM weekly. SONAL 15. Also takes Sildenafil 50 mg PRN for ED with good response. Initial T level was in the 400s but free T was low. Repeat T level 2023 in the 800s. Reports overall symptomatic  "improvement on TRT. Also potentially interested in fertility in the future with his significant other who is in he 40s. PSA 3.12 (08/2023)    7/10/24 [facility] urology  He stopped TRT 6 weeks ago. Lost job and insurance.    ASSESSMENT & PLAN: Management options discussed. He is interested in fertility and wants to trial Clomid. Will repeat labs in 2 weeks to establish baseline levels. Will then start Clomid 50 mg every other day. He understands that his semen parameters are likely abnormal given his age and use of TRT since 08/2023. Continue Cialis 20 mg daily PRN for ED.     3/4/2025 OV Nathan Tovar  64-year-old gentleman with symptoms of hypogonadism.  Previously seen at [facility] urology.  Appears May 2024 he was on testosterone cypionate 100 mg IM weekly.  Also had ED.  Was interested in fertility because his significant other was in her 40s.  Stopped replacement in July 2024 and started Clomid every other day. Lyme's specialist put him on TRT 2023. Stopped due to insurance. Wants to start again.  His most recent testosterone was obtained by primary care.  His total testosterone was 263 which is below normal.  His free testosterone was 4.6 which is also low.  He is again symptomatic.  I did explain that with an elevated PSA he probably should undergo either a prostate biopsy or a prostate MRI.  He favors a biopsy rather than delaying to get an MRI which may or may not suggest cancer.  He is aware that only a biopsy is definitive for cancer.  The MRI is only suggestive.  He is aware that we do not replace testosterone in our clinic.  We generally refer people to endocrinology.    Plan: Refer to endocrine for TRT but wait until biopsy negative.  Schedule TRUS PNB.    Review of Systems    Lab Results   Component Value Date    PSA 6.9 (H) 01/30/2025     No results found for: \"TESTOSTERONE\"  No components found for: \"CR\"  No results found for: \"HBA1C\"    Objective     /80 (BP Location: Left arm, " "Patient Position: Sitting, Cuff Size: Standard)   Pulse 77   Ht 5' 5\" (1.651 m)   Wt 73.9 kg (163 lb)   SpO2 99%   BMI 27.12 kg/m²     Physical Exam  Cardiovascular:      Rate and Rhythm: Normal rate.   Pulmonary:      Effort: Pulmonary effort is normal.   Genitourinary:     Comments: 30 gm no nodules NT.  Skin:     General: Skin is warm.   Neurological:      General: No focal deficit present.      Mental Status: He is alert.   Psychiatric:         Mood and Affect: Mood normal.           [facility]  "

## 2025-03-04 NOTE — PATIENT INSTRUCTIONS
Prostate Biopsy Instructions    Blood thinners:  As discussed, you will need to avoid any blood thinners like Advil, Motrin, aspirin, and fish oil, in addition to anything you take by prescription which is known to be a blood thinner.  These should be avoided for 1 week prior to the procedure and if there is active bleeding in the urine or stool.    Diet:  You should prepare by having a clear liquid diet the day prior to the biopsy. You can eat a normal diet the day of the procedure.  In fact, you should have something light to eat before the procedure just so that you won't be dizzy or lightheaded.  You'll also need to have an enema about 1 to 2 hours prior to the biopsy in the office.    The biopsy:  The procedure will be done awake with local anesthesia injected around the prostate.  This will be done in the procedure area at the [facility] office.  You should not need to go to the hospital.    Antibiotics:  You will need to arrive 30 minutes prior to the visit to receive an intramuscular injection of antibiotics.    After the biopsy:  You can expect blood in the urine, stool, and ejaculate.  You can limit the bleeding by taking it easy the day of the biopsy by sitting still and drinking plenty of fluids for the next 6 hours after the procedure.  Obviously, you should call if you develop considerable bleeding which concerns you.  Following the biopsy, you should mostly sit upright to put pressure on the prostate for the next several hours.    Follow-up:  You should have a 1-week follow-up to review the pathology results.  This can be done either by a telephone visit or in person.

## 2025-03-05 ENCOUNTER — OFFICE VISIT (OUTPATIENT)
Dept: PHYSICAL THERAPY | Facility: CLINIC | Age: 65
End: 2025-03-05
Payer: COMMERCIAL

## 2025-03-05 DIAGNOSIS — M75.41 IMPINGEMENT SYNDROME OF RIGHT SHOULDER: ICD-10-CM

## 2025-03-05 DIAGNOSIS — M75.22 BICEPS TENDINITIS OF LEFT SHOULDER: ICD-10-CM

## 2025-03-05 DIAGNOSIS — M75.42 IMPINGEMENT SYNDROME OF LEFT SHOULDER: Primary | ICD-10-CM

## 2025-03-05 PROCEDURE — 97110 THERAPEUTIC EXERCISES: CPT

## 2025-03-05 PROCEDURE — 97112 NEUROMUSCULAR REEDUCATION: CPT

## 2025-03-05 NOTE — PROGRESS NOTES
"Daily Note     Today's date: 3/5/2025  Patient name: Mary Russo  : 1960  MRN: [id number]  Referring provider: Storm Wing DO  Dx:   Encounter Diagnosis     ICD-10-CM    1. Impingement syndrome of left shoulder  M75.42       2. Biceps tendinitis of left shoulder  M75.22       3. Impingement syndrome of right shoulder  M75.41                      Subjective: Pt presents to therapy stating that \"I feel a lot better after performing the exercises, but it only last for about 1-2 hours\". Pt adds that he continues to feel his highest spikes in pain upon waking up in the morning.       Objective: See treatment diary below      Assessment: Tolerated treatment well. Pt continues to temporarily increase b/l abduction and flexion ROM while decreasing pain t/o session. However, this has not yet led to consistent s/x relief outside of the clinic. Today's session saw an increased focus on RC strengthening interventions that were painless yet, challenging.  Patient would benefit from continued PT to increase RC strength and shoulder ROM b/l.      Plan: Continue per plan of care.      Precautions:   Past Medical History:   Diagnosis Date    Lyme disease            DOS N/A  SOC: 25  FOTO: 25  POC Expiration: 25  Last RE:N/A  Daily Treatment Log:  Date 2/28/25 3/5/25 2/13/25 2/14/25 2/27/25   Visit # 6 7 3 4 5; FOTO   Auth  6/12 7/12 3/12 4/12 5/12   Auth exp        Manual   5' 5' 5'   C/s distraction in supine        PA Mobs T1-T7 grades 3-4   SJ SJ SJ   CTJ Grade 5   SJ     AC Jt mob Grade 5 on R    SJ            There Exer 25' 25' 20' 25' 15'   Objective Measures        Pulleys  1 min flexion and Abduction B/L      Scapular retraction  20x following wall slides & SPC IR 20x following wall slides & SPC IR 20x following wall slides & SPC IR 20x following wall slides & SPC IR   HL SPC pullover    10x-painful today 2x10 w/ hands neutral and using BW    ER at 90 deg w/TB 2x10 w/ YTB 2x12-15 seated w/4# " db      SPC IR stretch behind the back W/SOS today; 5x20s R/L-d/c pain; went back to SPC; [id number] holds  2x10 w/Wall slides 2x 30s R/L 2x 30s R/L   Wall slides  2x10 2x10 2x10  2x10  2x10    ROSHNI rows        Box Drawing Supine 10x R/L 15x R/L; more difficult on L  10x R/L 10x R/L   AAROM abduction w/eccentric lowering b/l 2x10 on R-painful on L 2x10 on R-painful on L      ER                SL Abduction Performed on L; 2x10 w/BW Performed on L; 2x10 w/BW      HEP Updated  Updated/Reiewed/Given Updated/Reviewed    There Activ                                                        NMReed 20' 20' 20' 20' 25'   C/s retractions   2x10 t/o session     Prone I   2x10 2x10 2x10   Cat/Cow  20x on plinth 20x on plinth 2x10 2x10 2x10   Qped t/s rotation 20x on plinth 20x on plinth 2x10 2x10 R/L 2x10 R/L   Open Books   2x10; arms crossed if painful on L anterior delt 2x10; arms crossed if painful on L anterior delt 2x10 arms crossed    SOS IR stretch    30sx2 R/L    Corner chest stretch  10x10s 10x10s   10x5s-Trial   B/L shoulder extension stretch w/TB 3x20s B/L 3x20s B/L w/ROSHNI using 9# resistance; 2x30s   Blue TB, 10s, 20s, 30s holds           Modalities                                Access Code: [email or web address]  URL: [email or web address]  Date: 03/05/2025  Prepared by: Garcia Gibbs    Exercises  - Cat Cow  - 1-2 x daily - 7 x weekly - 2 sets - 10 reps  - Standing Thoracic Open Book at Wall  - 1-2 x daily - 7 x weekly - 2 sets - 10 reps  - Quadruped Thoracic Rotation Full Range with Hand on Neck  - 1-2 x daily - 7 x weekly - 2 sets - 10 reps  - Standing Bilateral Shoulder Internal Rotation AAROM with Dowel  - 1-2 x daily - 7 x weekly - 2 sets - 10 reps  - Shoulder Flexion Wall Slide with Towel  - 1-2 x daily - 7 x weekly - 2 sets - 10 reps  - Doorway Pec Stretch at 60 Degrees Abduction with Arm Straight  - 1 x daily - 7 x weekly - 2 sets - 10 reps - 10s hold  - Standing Upper Trapezius Stretch  - 1 x daily - 7 x  weekly - 2 sets - 3 reps - 30s hold  - Standing Shoulder Extension ROM with Dowel  - 1 x daily - 7 x weekly - 2 sets - 10 reps - 10s hold  - Prone Scapular Slide with Shoulder Extension  - 1-2 x daily - 7 x weekly - 2 sets - 10 reps - 1-3s hold  - Standing Bilateral Low Shoulder Row with Anchored Resistance  - 1-2 x daily - 7 x weekly - 2 sets - 10 reps - 1-3s hold  - Sidelying Shoulder Abduction Full Range of Motion  - 1 x daily - 7 x weekly - 2 sets - 10 reps  - Supine Box Drawing  - 1 x daily - 7 x weekly - 3 sets - 10 reps

## 2025-03-07 ENCOUNTER — OFFICE VISIT (OUTPATIENT)
Dept: PHYSICAL THERAPY | Facility: CLINIC | Age: 65
End: 2025-03-07
Payer: COMMERCIAL

## 2025-03-07 DIAGNOSIS — M75.22 BICEPS TENDINITIS OF LEFT SHOULDER: ICD-10-CM

## 2025-03-07 DIAGNOSIS — M75.42 IMPINGEMENT SYNDROME OF LEFT SHOULDER: Primary | ICD-10-CM

## 2025-03-07 DIAGNOSIS — M75.41 IMPINGEMENT SYNDROME OF RIGHT SHOULDER: ICD-10-CM

## 2025-03-07 PROCEDURE — 97110 THERAPEUTIC EXERCISES: CPT

## 2025-03-07 PROCEDURE — 97112 NEUROMUSCULAR REEDUCATION: CPT

## 2025-03-07 NOTE — PROGRESS NOTES
"Daily Note     Today's date: 3/7/2025  Patient name: Mary Russo  : 1960  MRN: [id number]  Referring provider: Storm Wing DO  Dx:   Encounter Diagnosis     ICD-10-CM    1. Impingement syndrome of left shoulder  M75.42       2. Biceps tendinitis of left shoulder  M75.22       3. Impingement syndrome of right shoulder  M75.41                      Subjective: Pt reports to therapy feeling his \"typical 6 or 7\" out of 10 pain upon waking up. Pt adds that he has been compliant w/HEP up to 2x/day and feels \"better\" for about 1-2 hours prior to his s/x returning.       Objective: See treatment diary below      Assessment: Tolerated treatment well. Pt is able to reduce pain and increase AROM during PT session. He maintains that his s/x return appx. 2 hours following treatment. Pt has an RE next week where we will assess the effectiveness of PT to this point. Today's session saw an increased focus on RC strengthing as opposed to ROM. Patient would benefit from continued PT      Plan: Continue per plan of care.      Precautions:   Past Medical History:   Diagnosis Date    Lyme disease            DOS N/A  SOC: 25  FOTO: 25  POC Expiration: 25  Last RE:N/A  Daily Treatment Log:  Date 2/28/25 3/5/25 3/7/25 2/14/25 2/27/25   Visit # 6 7 8 4 5; FOTO   Auth     Auth exp        Manual    5' 5'   C/s distraction in supine        PA Mobs T1-T7 grades 3-4    SJ SJ   CTJ Grade 5        AC Jt mob Grade 5 on R    SJ            There Exer 25' 25' 25' 25' 15'   Objective Measures        Pulleys  1 min flexion and Abduction B/L 10x in all directions flexion, scaption Abduction B/L     Scapular retraction  20x following wall slides & SPC IR  20x following wall slides & SPC IR 20x following wall slides & SPC IR   HL SPC pullover     2x10 w/ hands neutral and using BW    ER at 90 deg w/TB 2x10 w/ YTB 2x12-15 seated w/4# db 2x12-15 seated w/4# db     SPC IR stretch behind the back W/SOS " today; 5x20s R/L-d/c pain; went back to SPC; [id number] holds  2x10 2x 30s R/L 2x 30s R/L   Wall slides  2x10 2x10 2x10 2x10  2x10    ROSHNI rows        Box Drawing Supine 10x R/L 15x R/L; more difficult on L  10x R/L 10x R/L   AAROM abduction w/eccentric lowering b/l 2x10 on R-painful on L 2x10 on R-painful on L 2x10 on R-painful on L     ER                SL Abduction Performed on L; 2x10 w/BW Performed on L; 2x10 w/BW Performed on L; 2x10 w/BW     HEP Updated   Updated/Reviewed    There Activ                                                        NMReed 20' 20' 15' 20' 25'   C/s retractions        Prone I    2x10 2x10   Cat/Cow  20x on plinth 20x on plinth 20x on plinth 2x10 2x10   Qped t/s rotation 20x on plinth 20x on plinth 20x on plinth 2x10 R/L 2x10 R/L   Open Books    2x10; arms crossed if painful on L anterior delt 2x10 arms crossed    SOS IR stretch    30sx2 R/L    Corner chest stretch  10x10s 10x10s 10x10s  10x5s-Trial   B/L shoulder extension stretch w/TB 3x20s B/L 3x20s B/L w/ROSHNI using 9# resistance; 2x30s   Blue TB, 10s, 20s, 30s holds           Modalities                                Access Code: [email or web address]  URL: [email or web address]  Date: 03/05/2025  Prepared by: Garcia Gibbs    Exercises  - Cat Cow  - 1-2 x daily - 7 x weekly - 2 sets - 10 reps  - Standing Thoracic Open Book at Wall  - 1-2 x daily - 7 x weekly - 2 sets - 10 reps  - Quadruped Thoracic Rotation Full Range with Hand on Neck  - 1-2 x daily - 7 x weekly - 2 sets - 10 reps  - Standing Bilateral Shoulder Internal Rotation AAROM with Dowel  - 1-2 x daily - 7 x weekly - 2 sets - 10 reps  - Shoulder Flexion Wall Slide with Towel  - 1-2 x daily - 7 x weekly - 2 sets - 10 reps  - Doorway Pec Stretch at 60 Degrees Abduction with Arm Straight  - 1 x daily - 7 x weekly - 2 sets - 10 reps - 10s hold  - Standing Upper Trapezius Stretch  - 1 x daily - 7 x weekly - 2 sets - 3 reps - 30s hold  - Standing Shoulder Extension ROM with  Dowel  - 1 x daily - 7 x weekly - 2 sets - 10 reps - 10s hold  - Prone Scapular Slide with Shoulder Extension  - 1-2 x daily - 7 x weekly - 2 sets - 10 reps - 1-3s hold  - Standing Bilateral Low Shoulder Row with Anchored Resistance  - 1-2 x daily - 7 x weekly - 2 sets - 10 reps - 1-3s hold  - Sidelying Shoulder Abduction Full Range of Motion  - 1 x daily - 7 x weekly - 2 sets - 10 reps  - Supine Box Drawing  - 1 x daily - 7 x weekly - 3 sets - 10 reps

## 2025-03-12 ENCOUNTER — OFFICE VISIT (OUTPATIENT)
Dept: PHYSICAL THERAPY | Facility: CLINIC | Age: 65
End: 2025-03-12
Payer: COMMERCIAL

## 2025-03-12 DIAGNOSIS — M75.42 IMPINGEMENT SYNDROME OF LEFT SHOULDER: Primary | ICD-10-CM

## 2025-03-12 DIAGNOSIS — M75.41 IMPINGEMENT SYNDROME OF RIGHT SHOULDER: ICD-10-CM

## 2025-03-12 DIAGNOSIS — M75.22 BICEPS TENDINITIS OF LEFT SHOULDER: ICD-10-CM

## 2025-03-12 PROCEDURE — 97112 NEUROMUSCULAR REEDUCATION: CPT

## 2025-03-12 PROCEDURE — 97110 THERAPEUTIC EXERCISES: CPT

## 2025-03-14 ENCOUNTER — EVALUATION (OUTPATIENT)
Dept: PHYSICAL THERAPY | Facility: CLINIC | Age: 65
End: 2025-03-14
Payer: COMMERCIAL

## 2025-03-14 DIAGNOSIS — M75.42 IMPINGEMENT SYNDROME OF LEFT SHOULDER: Primary | ICD-10-CM

## 2025-03-14 DIAGNOSIS — M75.41 IMPINGEMENT SYNDROME OF RIGHT SHOULDER: ICD-10-CM

## 2025-03-14 DIAGNOSIS — M75.22 BICEPS TENDINITIS OF LEFT SHOULDER: ICD-10-CM

## 2025-03-14 PROCEDURE — 97112 NEUROMUSCULAR REEDUCATION: CPT

## 2025-03-14 PROCEDURE — 97110 THERAPEUTIC EXERCISES: CPT

## 2025-03-14 NOTE — PROGRESS NOTES
"PT Evaluation     Today's date: 3/14/2025  Patient name: Mary Russo  : 1960  MRN: [id number]  Referring provider: Storm Wing DO  Dx:   Encounter Diagnosis     ICD-10-CM    1. Impingement syndrome of left shoulder  M75.42       2. Biceps tendinitis of left shoulder  M75.22       3. Impingement syndrome of right shoulder  M75.41                        Assessment  Impairments: abnormal or restricted ROM, abnormal movement, activity intolerance, impaired physical strength, pain with function, poor posture  and poor body mechanics  Symptom irritability: moderate    Assessment details: Following 10 PT sessions pt has unfortunately remained symptomatic about the B/L shoulders. Pt has not yet achieved pain levels below his original rating of 5-6/10. Pt additionally wakes up in the morning with a \"heaviness\" feeling of both shoulders that has not been affected positively or negatively by PT interventions for either the C/s or B/L shoulders. Pt has additionally lost significant ROM from IE in both flexion and Abduction of B/L shoulders while maintaining ER/IR ROM. Pt had pain w/all active and passive measurements today. MMT revealed weak and painful flexion remaining scored as a 3+/5 from IE. Pt also has positive empty can, painful arc, neers, wise-tia and IR lag testing on the L, but not the RUE following today's RE. Pt has been able to reduce pain and increase B/L shoulder ROM t/o PT sessions however this relief and ROM increase only last for 2-3 hours following PT. At this time, pt will be discharging from PT, potentially temporarily, as he follows up w/ Dr. Wing on 3/18 to establish a targeted treatment plan going forward. Plan to D/c pt home w/HEP and follow up after his 3/18 appointment.    Understanding of Dx/Px/POC: excellent     Prognosis: good    Goals  Short Term Goals to be accomplished in 4 weeks:  STG1: Pt will be I with HEP to maximize progress between therapy sessions-MET " "3/14  STG2: Pt will be I with posture management-MET 3/14  STG3: Pt will demo full b/l shoulder AROM to improve self care (showering) and household ADLs (yardwork).-NOT MET  STG4: Pt will demo 4 or 4+/5 MMT strength in shoulder flexion w/less than 2/10 pain-NOT MET  STG5: Pt will report pain <3/10 in shoulder flexion-NOT MET     Long Term Goals to be accomplished in 12 weeks:   LTG1: Pt will demo full cervical AROM to prevent impingement in L shoulder-ONGOING  LTG2: Pt will return to chopping wood pain free as per PLOF -NOT MET  LTG3: Pt will demo shoulder strength WNL to allow lifting/carrying 20# above waist level-NOT MET      Plan  Patient would benefit from: PT eval and skilled physical therapy  Planned modality interventions: cryotherapy and thermotherapy: hydrocollator packs    Planned therapy interventions: home exercise program, therapeutic exercise, therapeutic activities, self care, patient education, manual therapy and neuromuscular re-education    Duration in weeks: 12  Plan of Care beginning date: 2/5/2025  Plan of Care expiration date: 4/30/2025  Treatment plan discussed with: patient  Plan details: HEP development, stretching, strengthening, A/AA/PROM, joint mobilizations, posture education, STM/MI as needed to reduce muscle tension, muscle reeducation, PLOC discussed and agreed upon with patient.        Subjective Evaluation    History of Present Illness  Date of onset: 1/4/2025  Mechanism of injury: 3/14/25:  Pt reports to therapy citing similar pain rating to his IE rating his baseline pain as \"5 or 6/10\" w/active motions approaching 90 deg of abduction/flexion. He continues to feel heaviness about the b/l shoulders each morning upon waking up. He notes that it takes \"up to 3 hours\" for this feeling to dissipate, while maintaining that this \"heaviness\" is not the same sensation as his 5/6 pain that presents when moving actively. He has reduced his ADLs and yardwork while in therapy and has still " not been able to reduce his pain or increase his ROM effectively through pt at this time. Pt is agreeable to d/c to home w/HEP today as he will be following up w/Dr. Wing on 3/18 to establish a continued plan of care.           Recurrent probem    Quality of life: good    Patient Goals  Patient goals for therapy: decreased pain, increased motion, return to sport/leisure activities, increased strength and independence with ADLs/IADLs    Pain  Current pain ratin  At best pain ratin  At worst pain ratin  Quality: dull ache, sharp and cramping  Alleviating factors: Cessation of activity.  Aggravating factors: overhead activity  Progression: worsening    Social Support  Steps to enter house: no  Stairs in house: yes   12  Lives in: one-story house    Employment status: not working  Hand dominance: right    Treatments  Previous treatment: injection treatment      Objective    Objective:     (*=Moderate Pain)  (**=Significantly Pain)    AROM: Thoracic Spine (25)    Extension Min-mod restriction  Right Rotation Min restriction      Left Rotation Nil restriction           AROM: Standing  R (25) L (25)  Shoulder flexion  105*   115*  Shoulder abduction  115*   115*  Shoulder ER Functional C7*   C6*  Shoulder IR Functional L2*   L2*  Shoulder ER@90  90*   85*  Shoulder IR   55*   55*     PROM: Supine  R (25) L (25)  Shoulder flexion  145*   130*  Shoulder abduction  155*   180*  Shoulder ER@90  90*   90*  Shoulder IR   60*   50*    STRENGTH:    R (25) L (25)    Shoulder flexion  4/5   3+/5**  Shoulder abduction  4+5   4/5  Shoulder ER@90  5/5   4/5  Shoulder IR   4+/5   4+/5  Upper Traps   5/5   5/5  Mid/Lower Traps  4/5   4/5      Posture: Pt's sitting posture is Mildly kyphotic w/moderate forward head posture.      Cervical Screen: Cervical AROM limitations  Flexion  Min restriction (25)  Extension No restriction (25)  R rotation Min restriction  (03/14/25)  L rotation Min restriction (03/14/25)  R sidebend No restriction (03/14/25)  L sidebend Min restriction (03/14/25)  Retraction Min restriction (03/14/25)      Mechanical Assessment: 2/5/25  Repeated C/s retraction: 10x in sitting,  testing Trial 1, dec pain w/ empty can  Repeated C/s retraction: 10x in sitting,  testing Trial 2, dec pain w/ empty can  Repeated C/s retraction w/pt OP: 10x in sitting,  testing Trial 3, cont. dec pain w/ empty can    : 2/5/25    1: R 90, L 90  4: R 92, L 90  7: 95 R, 85 L  2: R 90, L 85  5: R 90, L85  8: 92 R, 82 L  3: R 90, L 75  6: R 90, L82   9: 90 R, 82 L    Special Tests:     Rotator Cuff: (03/14/25)  Painful arc: Positive b/l  Infraspinatus Lag: Positive on L  Empty can (supraspinatus): Positive on L    Impingment: (03/14/25)   Jorge Luis tia: Positive on L  Painful arc: Positive b/l  Neers: Positive on L         Precautions:   Past Medical History:   Diagnosis Date   • Lyme disease            DOS N/A  SOC: 2/5/25  FOTO: 2/5/25  POC Expiration: 4/30/25  Last RE:N/A  Daily Treatment Log:  Date 2/28/25 3/5/25 3/7/25 3/12/25 3/14/25   Visit # 6 7 8 9 10;FOTO   Auth  6/12 7/12 8/12 9/12 10/12   Auth exp        Manual        C/s distraction in supine        PA Mobs T1-T7 grades 3-4        CTJ Grade 5        AC Jt mob Grade 5 on R                There Exer 25' 25' 25' 23' 25'   Objective Measures     SJ   Pulleys  1 min flexion and Abduction B/L 10x in all directions flexion, scaption Abduction B/L 10x in all directions flexion, scaption Abduction B/L    Scapular retraction  20x following wall slides & SPC IR   20x following wall slides   HL SPC pullover         ER at 90 deg w/TB 2x10 w/ YTB 2x12-15 seated w/4# db 2x12-15 seated w/4# db 2x12-15 seated w/4# db    SPC IR stretch behind the back W/SOS today; 5x20s R/L-d/c pain; [place] back [place] [facility] [id number] holds  2x10 2x10    Wall slides  2x10 2x10 2x10 2x10 2x10   ROSHNI rows    2x15 w/15#    Box Drawing Supine  10x R/L 15x R/L; more difficult on L      AAROM abduction w/eccentric lowering b/l 2x10 on R-painful on L 2x10 on R-painful on L 2x10 on R-painful on L     ER                SL Abduction Performed on L; 2x10 w/BW Performed on L; 2x10 w/BW Performed on L; 2x10 w/BW Performed on L; 2x10 w/BW    HEP Updated       There Activ                                                        NMReed 20' 20' 15' 15' 15'           C/s retractions        Prone I        Cat/Cow  20x on plinth 20x on plinth 20x on plinth 20x on plinth 20x on plinth   Qped t/s rotation 20x on plinth 20x on plinth 20x on plinth 20x on plinth 20x on plinth   Open Books        SOS IR stretch        Corner chest stretch  10x10s 10x10s 10x10s 10x10s 10x10s   B/L shoulder extension stretch w/TB 3x20s B/L 3x20s B/L w/ROSHNI using 9# resistance; 2x30s  3x20s B/L w/ROSHNI using 9# resistance; 2x30s            Modalities                                Access Code: [email or web address]  URL: [email or web address]  Date: 03/05/2025  Prepared by: Garcia Gibbs    Exercises  - Cat Cow  - 1-2 x daily - 7 x weekly - 2 sets - 10 reps  - Standing Thoracic Open Book at Wall  - 1-2 x daily - 7 x weekly - 2 sets - 10 reps  - Quadruped Thoracic Rotation Full Range with Hand on Neck  - 1-2 x daily - 7 x weekly - 2 sets - 10 reps  - Standing Bilateral Shoulder Internal Rotation AAROM with Dowel  - 1-2 x daily - 7 x weekly - 2 sets - 10 reps  - Shoulder Flexion Wall Slide with Towel  - 1-2 x daily - 7 x weekly - 2 sets - 10 reps  - Doorway Pec Stretch at 60 Degrees Abduction with Arm Straight  - 1 x daily - 7 x weekly - 2 sets - 10 reps - 10s hold  - Standing Upper Trapezius Stretch  - 1 x daily - 7 x weekly - 2 sets - 3 reps - 30s hold  - Standing Shoulder Extension ROM with Dowel  - 1 x daily - 7 x weekly - 2 sets - 10 reps - 10s hold  - Prone Scapular Slide with Shoulder Extension  - 1-2 x daily - 7 x weekly - 2 sets - 10 reps - 1-3s hold  - Standing Bilateral Low Shoulder Row  with Anchored Resistance  - 1-2 x daily - 7 x weekly - 2 sets - 10 reps - 1-3s hold  - Sidelying Shoulder Abduction Full Range of Motion  - 1 x daily - 7 x weekly - 2 sets - 10 reps  - Supine Box Drawing  - 1 x daily - 7 x weekly - 3 sets - 10 reps

## 2025-03-18 ENCOUNTER — OFFICE VISIT (OUTPATIENT)
Dept: OBGYN CLINIC | Facility: CLINIC | Age: 65
End: 2025-03-18
Payer: COMMERCIAL

## 2025-03-18 VITALS — BODY MASS INDEX: 27.16 KG/M2 | WEIGHT: 163 LBS | HEIGHT: 65 IN

## 2025-03-18 DIAGNOSIS — M24.811 INTERNAL DERANGEMENT OF RIGHT SHOULDER: ICD-10-CM

## 2025-03-18 DIAGNOSIS — M24.812 INTERNAL DERANGEMENT OF LEFT SHOULDER: Primary | ICD-10-CM

## 2025-03-18 PROCEDURE — 99213 OFFICE O/P EST LOW 20 MIN: CPT | Performed by: ORTHOPAEDIC SURGERY

## 2025-03-18 NOTE — PROGRESS NOTES
Assessment/Plan:  1. Internal derangement of left shoulder  MRI shoulder left wo contrast      2. Internal derangement of right shoulder  MRI shoulder right wo contrast          Mary has bilateral shoulder pain consistent with potential rotator cuff arthropathy based on exam.  He has not improved with conservative measures thus far with formal physical therapy.  I recommended MRI of both shoulders at this time for further evaluation.  I would like to rule out any large rotator cuff injury.  He will follow-up in the office after MRIs are complete we can discuss treatment options at that time.    Subjective:   Mary Russo is a 64 y.o. male who presents to the office for follow-up for bilateral shoulder pain.  He was last in the office 6 weeks ago with bilateral shoulder pain and negative x-rays.  He underwent physical therapy for the last 6 weeks which she states does not seem to have helped and he actually feels worse in the left shoulder than he did prior to starting PT.  He did have an MRI ordered at his last visit which was denied by insurance until PT was completed.  He denies any new injury.  He feels aching throbbing pain worse in the left than the right shoulder but significant increases with any overhead movement.  He feels like the left shoulder is weak.  He also has a history of subacromial injection 1 year ago and also a bicep tendon sheath injection this past fall.  These injections did not help very much.      Review of Systems   Constitutional:  Negative for chills, fever and unexpected weight change.   HENT:  Negative for hearing loss, nosebleeds and sore throat.    Eyes:  Negative for pain, redness and visual disturbance.   Respiratory:  Negative for cough, shortness of breath and wheezing.    Cardiovascular:  Negative for chest pain, palpitations and leg swelling.   Gastrointestinal:  Negative for abdominal pain, nausea and vomiting.   Endocrine: Negative for polyphagia and polyuria.  "  Genitourinary:  Negative for dysuria and hematuria.   Musculoskeletal:         See HPI   Skin:  Negative for rash and wound.   Neurological:  Negative for dizziness, numbness and headaches.   Psychiatric/Behavioral:  Negative for decreased concentration and suicidal ideas. The patient is not nervous/anxious.          Past Medical History:   Diagnosis Date    Lyme disease        Past Surgical History:   Procedure Laterality Date    BACK SURGERY         Family History   Family history unknown: Yes       Social History     Occupational History    Not on file   Tobacco Use    Smoking status: Every Day     Types: Cigarettes     Passive exposure: Past    Smokeless tobacco: Never   Vaping Use    Vaping status: Never Used   Substance and Sexual Activity    Alcohol use: Yes    Drug use: Never    Sexual activity: Not on file         Current Outpatient Medications:     B-D 3CC LUER-NATANAEL SYR 76AW7-6/2 23G X 1-1/2\" 3 ML MISC, use 1 every week (Patient not taking: Reported on 2/7/2025), Disp: , Rfl:     BD Disp Needle 23G X 1\" MISC, use 1 every week (Patient not taking: Reported on 2/7/2025), Disp: , Rfl:     sildenafil (VIAGRA) 50 MG tablet, Take 50 mg by mouth (Patient not taking: Reported on 2/7/2025), Disp: , Rfl:     tadalafil (CIALIS) 5 MG tablet, Take 5 mg by mouth daily (Patient not taking: Reported on 3/4/2025), Disp: , Rfl:     testosterone cypionate (DEPO-TESTOSTERONE) 200 mg/mL SOLN, Inject 0.5 mL (100 mg total) into a muscle every 7 days (Patient not taking: Reported on 2/7/2025), Disp: 10 mL, Rfl: 0    tinidazole (TINDAMAX) 500 MG tablet, Take 500 mg by mouth daily (Patient not taking: Reported on 8/15/2024), Disp: , Rfl:     No Known Allergies    Objective:  There were no vitals filed for this visit.         Right Shoulder Exam     Tenderness   The patient is experiencing no tenderness.    Range of Motion   Active abduction:  100 abnormal   Forward flexion:  100 abnormal     Muscle Strength   Abduction: 4/5 "   Internal rotation: 5/5   External rotation: 5/5   Supraspinatus: 5/5   Subscapularis: 5/5     Tests   Kang test: positive  Impingement: positive  Drop arm: negative    Other   Erythema: absent  Sensation: normal  Pulse: present      Left Shoulder Exam     Tenderness   The patient is experiencing no tenderness.     Range of Motion   Active abduction:  100 abnormal   Passive abduction:  normal   Extension:  normal   External rotation:  normal   Forward flexion:  100 abnormal   Internal rotation 0 degrees:  normal     Muscle Strength   Abduction: 4/5   Internal rotation: 5/5   External rotation: 5/5   Supraspinatus: 4/5   Subscapularis: 5/5     Tests   Kang test: positive  Impingement: positive  Drop arm: negative    Other   Erythema: absent  Sensation: normal  Pulse: present             Physical Exam  Vitals reviewed.   Constitutional:       Appearance: He is well-developed.   HENT:      Head: Normocephalic and atraumatic.   Eyes:      Conjunctiva/sclera: Conjunctivae normal.      Pupils: Pupils are equal, round, and reactive to light.   Cardiovascular:      Rate and Rhythm: Normal rate.      Pulses: Normal pulses.   Pulmonary:      Effort: Pulmonary effort is normal. No respiratory distress.   Musculoskeletal:      Cervical back: Normal range of motion and neck supple.      Comments: As noted in HPI   Skin:     General: Skin is warm and dry.   Neurological:      General: No focal deficit present.      Mental Status: He is alert and oriented to person, place, and time.   Psychiatric:         Mood and Affect: Mood normal.         Behavior: Behavior normal.             This document was created using speech voice recognition software.   Grammatical errors, random word insertions, pronoun errors, and incomplete sentences are an occasional consequence of this system due to software limitations, ambient noise, and hardware issues.   Any formal questions or concerns about content, text, or information contained  within the body of this dictation should be directly addressed to the provider for clarification.

## 2025-03-24 ENCOUNTER — HOSPITAL ENCOUNTER (OUTPATIENT)
Dept: RADIOLOGY | Facility: HOSPITAL | Age: 65
Discharge: HOME | End: 2025-03-24
Attending: ORTHOPAEDIC SURGERY
Payer: COMMERCIAL

## 2025-03-24 DIAGNOSIS — M24.811 INTERNAL DERANGEMENT OF RIGHT SHOULDER: ICD-10-CM

## 2025-03-24 DIAGNOSIS — M24.812 INTERNAL DERANGEMENT OF LEFT SHOULDER: ICD-10-CM

## 2025-03-24 PROCEDURE — 73221 MRI JOINT UPR EXTREM W/O DYE: CPT

## 2025-04-01 ENCOUNTER — OFFICE VISIT (OUTPATIENT)
Dept: OBGYN CLINIC | Facility: CLINIC | Age: 65
End: 2025-04-01
Payer: COMMERCIAL

## 2025-04-01 ENCOUNTER — CONSULT (OUTPATIENT)
Age: 65
End: 2025-04-01
Payer: COMMERCIAL

## 2025-04-01 VITALS — WEIGHT: 162 LBS | TEMPERATURE: 97.4 F | BODY MASS INDEX: 26.96 KG/M2

## 2025-04-01 VITALS — HEIGHT: 65 IN | WEIGHT: 162 LBS | BODY MASS INDEX: 26.99 KG/M2

## 2025-04-01 DIAGNOSIS — L57.0 KERATOSIS, ACTINIC: ICD-10-CM

## 2025-04-01 DIAGNOSIS — L81.4 LENTIGINES: Primary | ICD-10-CM

## 2025-04-01 DIAGNOSIS — L82.0 INFLAMED SEBORRHEIC KERATOSIS: ICD-10-CM

## 2025-04-01 DIAGNOSIS — M75.111 NONTRAUMATIC INCOMPLETE TEAR OF RIGHT ROTATOR CUFF: ICD-10-CM

## 2025-04-01 DIAGNOSIS — L98.9 SKIN LESION OF FACE: ICD-10-CM

## 2025-04-01 DIAGNOSIS — M75.22 BICEPS TENDINITIS OF LEFT SHOULDER: ICD-10-CM

## 2025-04-01 DIAGNOSIS — L73.8 SEBACEOUS HYPERPLASIA: ICD-10-CM

## 2025-04-01 DIAGNOSIS — L82.1 SEBORRHEIC KERATOSIS: ICD-10-CM

## 2025-04-01 DIAGNOSIS — D18.01 CHERRY ANGIOMA: ICD-10-CM

## 2025-04-01 DIAGNOSIS — M75.122 NONTRAUMATIC COMPLETE TEAR OF LEFT ROTATOR CUFF: Primary | ICD-10-CM

## 2025-04-01 DIAGNOSIS — D22.9 MULTIPLE MELANOCYTIC NEVI: ICD-10-CM

## 2025-04-01 PROCEDURE — 17110 DESTRUCTION B9 LES UP TO 14: CPT | Performed by: DERMATOLOGY

## 2025-04-01 PROCEDURE — 99204 OFFICE O/P NEW MOD 45 MIN: CPT | Performed by: DERMATOLOGY

## 2025-04-01 PROCEDURE — 17003 DESTRUCT PREMALG LES 2-14: CPT | Performed by: DERMATOLOGY

## 2025-04-01 PROCEDURE — 99213 OFFICE O/P EST LOW 20 MIN: CPT | Performed by: ORTHOPAEDIC SURGERY

## 2025-04-01 PROCEDURE — 17000 DESTRUCT PREMALG LESION: CPT | Performed by: DERMATOLOGY

## 2025-04-01 NOTE — PROGRESS NOTES
Assessment/Plan:  1. Nontraumatic complete tear of left rotator cuff  Ambulatory referral to Orthopedic Surgery      2. Nontraumatic incomplete tear of right rotator cuff        3. Biceps tendinitis of left shoulder  Ambulatory referral to Orthopedic Surgery          Mary has bilateral shoulder pain consistent with rotator cuff arthropathy.  In the left shoulder he is clearly dealing with a larger issue and has a full-thickness posterior supraspinatus tear which is consistent with his weakness on examination.  For this injury, I have recommended consultation with Dr. Jin for arthroscopic rotator cuff repair at this time.  He verbalized understanding of this plan and will happily meet with Dr. Jin to discuss his options.  In the right shoulder he has a partial tear with 1 thirds thickness supraspinatus tear.  While he could consider surgical intervention for this, I have recommended that he continue with nonoperative treatment as he will most likely require use of the right arm with recovery from the left shoulder surgery.  With partial tear this could eventually heal with continued conservative treatment.  He verbalized understanding this plan.  He will follow-up with Dr. Jin this week.    Subjective:   Mary Russo is a 64 y.o. male who presents to the office for follow-up for bilateral shoulder pain.  He has been having ongoing discomfort in both shoulders and we sent in for MRIs of both shoulders at last office visit.  He has already completed formal physical therapy in both shoulders for 6 weeks which has failed to significantly help.  At last office visit he had weakness in the left shoulder that was more concerning for rotator cuff injury.  He had similar pain in the right but not as severe.  He has benefited from ultrasound-guided biceps tendon sheath injections in the past however his pain currently seems to be somewhat different.      Review of Systems   Constitutional:  Negative for chills,  "fever and unexpected weight change.   HENT:  Negative for hearing loss, nosebleeds and sore throat.    Eyes:  Negative for pain, redness and visual disturbance.   Respiratory:  Negative for cough, shortness of breath and wheezing.    Cardiovascular:  Negative for chest pain, palpitations and leg swelling.   Gastrointestinal:  Negative for abdominal pain, nausea and vomiting.   Endocrine: Negative for polyphagia and polyuria.   Genitourinary:  Negative for dysuria and hematuria.   Musculoskeletal:         See HPI   Skin:  Negative for rash and wound.   Neurological:  Negative for dizziness, numbness and headaches.   Psychiatric/Behavioral:  Negative for decreased concentration and suicidal ideas. The patient is not nervous/anxious.          Past Medical History:   Diagnosis Date    Lyme disease        Past Surgical History:   Procedure Laterality Date    BACK SURGERY         Family History   Family history unknown: Yes       Social History     Occupational History    Not on file   Tobacco Use    Smoking status: Every Day     Types: Cigarettes     Passive exposure: Past    Smokeless tobacco: Never   Vaping Use    Vaping status: Never Used   Substance and Sexual Activity    Alcohol use: Yes    Drug use: Never    Sexual activity: Not on file         Current Outpatient Medications:     B-D 3CC LUER-NATANAEL SYR 01YB5-4/2 23G X 1-1/2\" 3 ML MISC, use 1 every week (Patient not taking: Reported on 2/7/2025), Disp: , Rfl:     BD Disp Needle 23G X 1\" MISC, use 1 every week (Patient not taking: Reported on 2/7/2025), Disp: , Rfl:     sildenafil (VIAGRA) 50 MG tablet, Take 50 mg by mouth (Patient not taking: Reported on 2/7/2025), Disp: , Rfl:     tadalafil (CIALIS) 5 MG tablet, Take 5 mg by mouth daily (Patient not taking: Reported on 3/4/2025), Disp: , Rfl:     testosterone cypionate (DEPO-TESTOSTERONE) 200 mg/mL SOLN, Inject 0.5 mL (100 mg total) into a muscle every 7 days (Patient not taking: Reported on 2/7/2025), Disp: 10 mL, " Rfl: 0    tinidazole (TINDAMAX) 500 MG tablet, Take 500 mg by mouth daily (Patient not taking: Reported on 8/15/2024), Disp: , Rfl:     No Known Allergies    Objective:  There were no vitals filed for this visit.         Right Shoulder Exam     Tenderness   The patient is experiencing tenderness in the biceps tendon.    Range of Motion   Active abduction:  normal   Passive abduction:  normal   Extension:  normal   External rotation:  normal   Forward flexion:  normal   Internal rotation 0 degrees:  Lumbar abnormal     Muscle Strength   Abduction: 5/5   Internal rotation: 5/5   External rotation: 5/5   Supraspinatus: 5/5   Subscapularis: 5/5   Biceps: 5/5     Tests   Kang test: positive  Impingement: positive      Left Shoulder Exam     Tenderness   The patient is experiencing tenderness in the biceps tendon.    Range of Motion   Active abduction:  100 abnormal   Passive abduction:  140 abnormal   Extension:  normal   External rotation:  normal   Forward flexion:  140 abnormal   Internal rotation 0 degrees:  Sacrum abnormal     Muscle Strength   Abduction: 4/5   Internal rotation: 5/5   External rotation: 5/5   Supraspinatus: 3/5   Subscapularis: 5/5     Tests   Kang test: positive  Impingement: positive  Drop arm: negative    Other   Erythema: absent  Sensation: normal  Pulse: present             Physical Exam  Vitals reviewed.   Constitutional:       Appearance: He is well-developed.   HENT:      Head: Normocephalic and atraumatic.   Eyes:      Conjunctiva/sclera: Conjunctivae normal.      Pupils: Pupils are equal, round, and reactive to light.   Cardiovascular:      Rate and Rhythm: Normal rate.      Pulses: Normal pulses.   Pulmonary:      Effort: Pulmonary effort is normal. No respiratory distress.   Musculoskeletal:      Cervical back: Normal range of motion and neck supple.      Comments: As noted in HPI   Skin:     General: Skin is warm and dry.   Neurological:      General: No focal deficit present.       Mental Status: He is alert and oriented to person, place, and time.   Psychiatric:         Mood and Affect: Mood normal.         Behavior: Behavior normal.         I have personally reviewed pertinent films in PACS and my interpretation is as follows:  MRI of the left shoulder demonstrates a full-thickness posterior supraspinatus tear  MRI of the right shoulder demonstrates a partial-thickness bursal sided supraspinatus tear.      This document was created using speech voice recognition software.   Grammatical errors, random word insertions, pronoun errors, and incomplete sentences are an occasional consequence of this system due to software limitations, ambient noise, and hardware issues.   Any formal questions or concerns about content, text, or information contained within the body of this dictation should be directly addressed to the provider for clarification.

## 2025-04-01 NOTE — PROGRESS NOTES
"[facility] Note     Patient Name: Mary Russo  Encounter Date: 4/1/2025     Have you been cared for by a [facility] Dermatologist in the last 3 years and, if so, which description applies to you?    NO.   I am considered a \"new\" patient and must complete all patient intake questions. I am MALE/not capable of bearing children.    REVIEW OF SYSTEMS:  Have you recently had or currently have any of the following? Recent fever or chills? No  Any non-healing wound? No   PAST MEDICAL HISTORY:  Have you personally ever had or currently have any of the following?  If \"YES,\" then please provide more detail. Skin cancer (such as Melanoma, Basal Cell Carcinoma, Squamous Cell Carcinoma?  No  Tuberculosis, HIV/AIDS, Hepatitis B or C: No  Radiation Treatment No   HISTORY OF IMMUNOSUPPRESSION:   Do you have a history of any of the following:  Systemic Immunosuppression such as Diabetes, Biologic or Immunotherapy, Chemotherapy, Organ Transplantation, Bone Marrow Transplantation or Prednisone?  No     Answering \"YES\" requires the addition of the dotphrase \"IMMUNOSUPPRESSED\" as the first diagnosis of the patient's visit.   FAMILY HISTORY:  Any \"first degree relatives\" (parent, brother, sister, or child) with the following?    Skin Cancer, Pancreatic or Other Cancer? No   PATIENT EXPERIENCE:    Do you want the Dermatologist to perform a COMPLETE skin exam today including a clinical examination under the \"bra and underwear\" areas?  Yes  If necessary, do we have your permission to call and leave a detailed message on your Preferred Phone number that includes your specific medical information?  Yes      No Known Allergies   Current Outpatient Medications:     B-D 3CC LUER-NATANAEL SYR 68FH4-6/2 23G X 1-1/2\" 3 ML MISC, use 1 every week (Patient not taking: Reported on 2/7/2025), Disp: , Rfl:     BD Disp Needle 23G X 1\" MISC, use 1 every week (Patient not taking: Reported on 2/7/2025), Disp: , Rfl:     sildenafil (VIAGRA) 50 " MG tablet, Take 50 mg by mouth (Patient not taking: Reported on 2/7/2025), Disp: , Rfl:     tadalafil (CIALIS) 5 MG tablet, Take 5 mg by mouth daily (Patient not taking: Reported on 3/4/2025), Disp: , Rfl:     testosterone cypionate (DEPO-TESTOSTERONE) 200 mg/mL SOLN, Inject 0.5 mL (100 mg total) into a muscle every 7 days (Patient not taking: Reported on 2/7/2025), Disp: 10 mL, Rfl: 0    tinidazole (TINDAMAX) 500 MG tablet, Take 500 mg by mouth daily (Patient not taking: Reported on 8/15/2024), Disp: , Rfl:           Whom besides the patient is providing clinical information about today's encounter?   NO ADDITIONAL HISTORIAN (patient alone provided history)    Physical Exam and Assessment/Plan by Diagnosis:    Patient presents for new patient appointment for a skin check.  He has no history of skin cancer.  He has 2 spots of concerns on his nose and right forearm.    SEBORRHEIC KERATOSES  - Relevant exam: Scattered over the trunk/extremities are waxy brown to black plaques and papules with stuck on appearance  - Exam and clinical history consistent with seborrheic keratoses  - Counseled that these are benign growths that do not require treatment  - Counseled that removal of lesions is considered cosmetic and so would incur a fee should patient elect to move forward.   - Patient to hold on treatments for now but will inform us should they desire additional treatments    MELANOCYTIC NEVI  -Relevant exam: Scattered over the trunk/extremities are homogenously pigmented brown macules and papules. ELM performed and without concerning findings.  - Exam and clinical history consistent with melanocytic nevi  - Educated on the ABCDE's of melanoma; handout provided  - Counseled to return to clinic prior to scheduled appointment should any of these lesions change or should any new lesions of concern arise  - Counseled on use of sun protection daily. Reviewed latest FDA sunscreen guidelines, including use of broad spectrum (UVA  and UVB blocking) sunscreen or sun protective clothing with SPF 30-50 every 2-3 hours and reapplied after exposure to water; use of photoprotective clothing, including a broad brim hat and UPF rated clothing if outdoors for several hours; avoid use of tanning beds as these pose significant risk for melanoma and skin cancer.    LENTIGINES  OTHER SKIN CHANGES DUE TO CHRONIC EXPOSURE TO NONIONIZING RADIATION  - Relevant exam: Hands   Over sun exposed areas are brown macules. ELM performed and without concerning findings.  - Exam and clinical history consistent with lentigines.  - Educated that these are indicative of prior sun exposure.   - Counseled to return to clinic prior to scheduled appointment should any of these lesions change or should any new lesions of concern arise.  - Recommended use of sunscreen as above and below.  - Counseled on use of sun protection daily. Reviewed latest FDA sunscreen guidelines, including use of broad spectrum (UVA and UVB blocking) sunscreen or sun protective clothing with SPF 30-50 every 2-3 hours and reapplied after exposure to water; use of photoprotective clothing, including a broad brim hat and UPF rated clothing if outdoors for several hours; avoid use of tanning beds as these pose significant risk for melanoma and skin cancer.    CHERRY ANGIOMAS  - Relevant exam: Scattered over the trunk/extremities are red papules  - Exam and clinical history consistent with cherry angiomas  - Educated that these are benign  - Educated that removal is considered aesthetic and would incur a fee.  - Patient does not wish to pursue removal at this time but will contact us should this change.    ACTINIC KERATOSES  - Relevant exam: On the Nose, and right dorsal forearm are gritty pink papules  - Exam and clinical history consistent with actinic keratoses  - Discussed that these lesions are considered premalignant with the potential to evolve into squamous cell carcinoma.   - Discussed that these  are due to chronic sun exposure and therefore recommend use of sunscreen/sun protection to prevent further sun damage  - Discussed treatment options, including liquid nitrogen destruction, topical immunotherapy, and photodynamic therapy, including risks, benefits      PROCEDURE:  DESTRUCTION OF PRE-MALIGNANT LESIONS    - After a thorough discussion of treatment options and risk/benefits/alternatives (including but not limited to local pain, scarring, dyspigmentation, blistering, and possible superinfection), verbal and written consent were obtained and the aforementioned lesions were treated on with cryotherapy using liquid nitrogen x 1 cycle for 5-10 seconds.    TOTAL NUMBER of 6 pre-malignant lesions were treated today on the ANATOMIC LOCATION: Nose, right dorsal forearm .     The patient tolerated the procedure well, and after-care instructions were provided.    SEBACEOUS HYPERPLASIA    Physical Exam:  Anatomic Location Affected:  Right forehead near the center   Morphological Description:  3mm skin colored papule.  Sebaceous by dermoscopy  Pertinent Positives:  Pertinent Negatives:    Additional History of Present Condition:  Present on exam     Assessment and Plan:  Based on a thorough discussion of this condition and the management approach to it (including a comprehensive discussion of the known risks, side effects and potential benefits of treatment), the patient (family) agrees to implement the following specific plan:  Reassured benign     Sebaceous Hyperplasia  Sebaceous hyperplasia is a common, benign condition of enlarged oil secreting (sebaceous) glands commonly found on the forehead and cheeks of middle-aged and elderly patients. They normally appear as small yellow bumps up to 3mm in diameter that can be single or multiple. The bumps on the face often display a centrall dell. Occasionally, these bumps can occur on the chest, areola, mouth, and genitals. Rarely, they can grow to take a giant form, or  be arranged linearly.     Causes of sebaceous hyperplasia  Sebaceous hyperplasic is a form of benign hair follicle tumor and can often be confused with basal cell carcinoma. It can be more prevalent in immunosuppressed patients such as those undergoing organ transplantation. In the rare Tino-Lisa syndrome, sebaceous hyperplasia occurs in association with internal cancers.  Lesions of sebaceous hyperplasia are benign, with no known potential for malignant transformation, but they may be associated with nonmelanoma skin cancer in transplantation patients.     How we do diagnose sebaceous hyperplasia?  Your dermatologist may take a closer look at the bumps with a device called a dermatoscope. Common features include a central hair follicle surrounded by yellowish lobules with prominent blood vessels.     What is the treatment of sebaceous hyperplasia?   Since sebaceous hyperplasia is benign with no known potential for transformation into cancer, treatment is mostly for cosmetic reasons or if the lesions become irritated. Options include   Light electrocautery or laser vaporization  Oral isotrentinoin is effective for extensive or disfiguring spots, but do not prevent recurrence   Antiandrogens may be used in females to decrease the size and improve overall appearance of bumps      INFLAMED SEBORRHEIC KERATOSIS    Physical Exam:  Anatomic Location Affected & Morphological Description:  Waxy well demarcated sessile stuck on brown papule/s with erythema/crusting on the right zygoma       Additional History of Present Condition:  Present for years, recently has become painful, irritated, bleeding    Assessment and Plan:  Based on a thorough discussion of this condition and the management approach to it (including a comprehensive discussion of the known risks, side effects and potential benefits of treatment), the patient (family) agrees to implement the following specific plan:  Cryotherapy given symptoms and  inflammation  After care discussed    PROCEDURE:  DESTRUCTION OF BENIGN LESIONS  After a thorough discussion of treatment options and risk/benefits/alternatives (including but not limited to local pain, scarring, dyspigmentation, blistering, and possible superinfection), verbal and written consent were obtained and the aforementioned lesions were treated on with cryotherapy using liquid nitrogen x 1 cycle for 5-10 seconds.    TOTAL NUMBER of 1 lesions were treated today on the ANATOMIC LOCATION: right zygoma     The patient tolerated the procedure well, and after-care instructions were provided.    Scribe Attestation      I,:  Renata Fraser MA am acting as a scribe while in the presence of the attending physician.:       I,:  Cinda Hagan MD personally performed the services described in this documentation    as scribed in my presence.:

## 2025-04-03 ENCOUNTER — PROCEDURE VISIT (OUTPATIENT)
Dept: UROLOGY | Facility: CLINIC | Age: 65
End: 2025-04-03
Payer: COMMERCIAL

## 2025-04-03 ENCOUNTER — OFFICE VISIT (OUTPATIENT)
Dept: OBGYN CLINIC | Facility: CLINIC | Age: 65
End: 2025-04-03
Payer: COMMERCIAL

## 2025-04-03 VITALS
BODY MASS INDEX: 26.82 KG/M2 | SYSTOLIC BLOOD PRESSURE: 112 MMHG | OXYGEN SATURATION: 97 % | HEIGHT: 65 IN | WEIGHT: 161 LBS | HEART RATE: 78 BPM | DIASTOLIC BLOOD PRESSURE: 74 MMHG

## 2025-04-03 VITALS — BODY MASS INDEX: 26.82 KG/M2 | HEIGHT: 65 IN | WEIGHT: 161 LBS

## 2025-04-03 DIAGNOSIS — M75.111 NONTRAUMATIC INCOMPLETE TEAR OF RIGHT ROTATOR CUFF: ICD-10-CM

## 2025-04-03 DIAGNOSIS — Z01.818 PRE-OP EVALUATION: ICD-10-CM

## 2025-04-03 DIAGNOSIS — M75.122 NONTRAUMATIC COMPLETE TEAR OF LEFT ROTATOR CUFF: Primary | ICD-10-CM

## 2025-04-03 DIAGNOSIS — R97.20 ELEVATED PSA: Primary | ICD-10-CM

## 2025-04-03 DIAGNOSIS — M75.22 BICEPS TENDINITIS OF LEFT SHOULDER: ICD-10-CM

## 2025-04-03 PROCEDURE — 99214 OFFICE O/P EST MOD 30 MIN: CPT | Performed by: ORTHOPAEDIC SURGERY

## 2025-04-03 PROCEDURE — G0416 PROSTATE BIOPSY, ANY MTHD: HCPCS | Performed by: STUDENT IN AN ORGANIZED HEALTH CARE EDUCATION/TRAINING PROGRAM

## 2025-04-03 PROCEDURE — 88342 IMHCHEM/IMCYTCHM 1ST ANTB: CPT | Performed by: STUDENT IN AN ORGANIZED HEALTH CARE EDUCATION/TRAINING PROGRAM

## 2025-04-03 PROCEDURE — 20610 DRAIN/INJ JOINT/BURSA W/O US: CPT

## 2025-04-03 PROCEDURE — 88344 IMHCHEM/IMCYTCHM EA MLT ANTB: CPT | Performed by: STUDENT IN AN ORGANIZED HEALTH CARE EDUCATION/TRAINING PROGRAM

## 2025-04-03 PROCEDURE — 76942 ECHO GUIDE FOR BIOPSY: CPT | Performed by: UROLOGY

## 2025-04-03 PROCEDURE — 96372 THER/PROPH/DIAG INJ SC/IM: CPT

## 2025-04-03 PROCEDURE — 55700 PR PROSTATE NEEDLE BIOPSY ANY APPROACH: CPT | Performed by: UROLOGY

## 2025-04-03 RX ORDER — CEFTRIAXONE 1 G/1
1000 INJECTION, POWDER, FOR SOLUTION INTRAMUSCULAR; INTRAVENOUS ONCE
Status: COMPLETED | OUTPATIENT
Start: 2025-04-03 | End: 2025-04-03

## 2025-04-03 RX ORDER — CEFAZOLIN SODIUM 2 G/50ML
2000 SOLUTION INTRAVENOUS ONCE
OUTPATIENT
Start: 2025-04-03 | End: 2025-04-03

## 2025-04-03 RX ADMIN — TRIAMCINOLONE ACETONIDE 40 MG: 40 INJECTION, SUSPENSION INTRA-ARTICULAR; INTRAMUSCULAR at 14:00

## 2025-04-03 RX ADMIN — ROPIVACAINE HYDROCHLORIDE 4 ML: 5 INJECTION, SOLUTION EPIDURAL; INFILTRATION; PERINEURAL at 14:00

## 2025-04-03 RX ADMIN — CEFTRIAXONE 1000 MG: 1 INJECTION, POWDER, FOR SOLUTION INTRAMUSCULAR; INTRAVENOUS at 15:10

## 2025-04-03 NOTE — ASSESSMENT & PLAN NOTE
Upon examination and review of MRI findings, we did discuss that the patient has a rotator cuff tear, resulting in shoulder pain, limited range of motion, and weakness. We did discuss non-operative versus operative treatment. Specifically, non-operative treatment would include activity modifications, formal PT, over-the-counter medications, and steroid injections. Because Mary has failed various non-operative interventions so far and is an overall healthy patient with a full-thickness rotator cuff tear on MRI, I recommend surgical intervention with a rotator cuff repair and biceps tenodesis.     We had a detailed discussion of the risks, benefits, and alternatives to this procedure. The risks include but are not limited to infection, bleeding, stiffness, loss of range of motion, blood clot, failure of surgery, fracture, risk of potential future arthritis, swelling, injury to surrounding structures/nerve/artery/vein, failure of medical implants or surgical instruments, retained hardware and/or foreign body, and continued pain/dysfunction/disability. We discussed the expected timeline for recovery including the timeline for return to work and sporting activities. The patient expressed good understanding and elected to proceed. They will meet be scheduled at a mutually convenient time in the near future.     We discussed that smoking does place him at elevated risk of failure of healing, and encouraged the patient to work on quitting leading up to and for at least 6 weeks after surgery. He is not interested in referral to [facility] today. We also discussed obtaining pre-op labs and EKG, which were ordered today.     A post-op sling was provided today for him to bring to the hospital on the day of surgery. We discussed that the patient will spend 6 weeks in a sling at all times, including sleep, except for hygiene. I recommend that they do not drive while in the sling. He will start PT per protocol  5-7 days post-op, which will be provided on the day of surgery. We discussed post-op medications for pain and Vitamin D supplementation to promote tendon healing. In the meantime, the patient can use ice/heat and over-the-counter medications as needed for pain.    Follow up 10-14 days after surgery.      Orders:    Ambulatory referral to Orthopedic Surgery    Sling    Basic metabolic panel; Future    CBC and differential; Future    ECG 12 lead; Future    Hemoglobin A1C; Future

## 2025-04-03 NOTE — PROGRESS NOTES
Biopsy prostate     Date/Time  4/3/2025 3:00 PM     Performed by  Nathan Tovar MD   Authorized by  Nathan Tovar MD     Universal Protocol   Consent: Written consent obtained.  Risks and benefits: risks, benefits and alternatives were discussed  Consent given by: patient  Patient understanding: patient states understanding of the procedure being performed  Patient consent: the patient's understanding of the procedure matches consent given  Procedure consent: procedure consent matches procedure scheduled  Relevant documents: relevant documents present and verified  Test results: test results available and properly labeled  Site marked: the operative site was not marked  Radiology Images displayed and confirmed. If images not available, report reviewed: imaging studies available  Patient identity confirmed: verbally with patient      Local anesthesia used: yes      Anesthesia: nerve block     Anesthesia   Local anesthesia used: yes  Local Anesthetic: lidocaine 2% without epinephrine  Anesthetic total: 10 mL     Sedation   Patient sedated: no        Specimen: yes    Culture: no   Procedure Details   Procedure Notes: Prostate Needle Biopsy  He was informed of the risks of the procedure, including but not limited to bleeding, infection, urinary retention, sepsis and death.  Questions were encouraged and answered.  After informed consent was obtained, he was given 1gm of rocephin intramuscularly.  He was then placed on the table in the left lateral decubitus position.      A EasySize ultrasound machine and a biplanar transrectal probe with an access port guide was used. The probe was surrounded with a condom, filled with ultrasound contact gel.  It was gently introduced into the patient's rectum using ultrasound guidance.    Transrectal ultrasound of the prostate was performed with multiple images being obtained. He measured 46mL in volume.  No classic hypoechoic lesions were noted nor was there evidence of  asymmetry or dilatation of the seminal vesicles appreciated.     Following the transrectal ultrasound of the prostate gland, 5mL of 2% Xylocaine was injected into the junction of the seminal vesicle and prostate on each side.  Ultrasound-guided biopsy of the prostate was then performed with a disposable biopsy tool, with a total of 10 cores of tissue being obtained in a standard fashion, including the transition and peripheral zones bilaterally.  Specimens were sent to the lab in formalin.      At the termination of the procedure the prostate was not actively bleeding on ultrasound and there was no active bleeding from the rectum.  The patient tolerated the procedure well. He was returned to the sitting position and felt well.    He was told to go to the ED for fevers, chills, or general malaise.    He was told to expect some hematochezia and hematuria for 2-3 days and up to 3 months of hematospermia.  He should call if concerned about the amount or duration of blood.    He will follow-up in about a week for the results of his biopsy.  Patient tolerance: patient tolerated the procedure well with no immediate complications

## 2025-04-03 NOTE — PATIENT INSTRUCTIONS
You underwent a successful biopsy today.  It will be a week before the results are back.  Be sure to have a follow-up appointment.  Avoid any blood thinners like fish oil or aspirin over the next few days.  Call if you have any fevers, chills, or excessive blood in the urine or stool.  Be sure to sit still over the next 6 hours today and drink lots of water so that there are no clots in the urine.  Do not strain to have bowel movements over the next few days.

## 2025-04-03 NOTE — PROGRESS NOTES
Patient Name: Mary Russo      : 1960       MRN: [id number]   Encounter Provider: Lukasz Jin MD   Encounter Date: 25  Encounter department: [facility]         Assessment & Plan  Nontraumatic complete tear of left rotator cuff  Upon examination and review of MRI findings, we did discuss that the patient has a rotator cuff tear, resulting in shoulder pain, limited range of motion, and weakness. We did discuss non-operative versus operative treatment. Specifically, non-operative treatment would include activity modifications, formal PT, over-the-counter medications, and steroid injections. Because Mary has failed various non-operative interventions so far and is an overall healthy patient with a full-thickness rotator cuff tear on MRI, I recommend surgical intervention with a rotator cuff repair and biceps tenodesis.     We had a detailed discussion of the risks, benefits, and alternatives to this procedure. The risks include but are not limited to infection, bleeding, stiffness, loss of range of motion, blood clot, failure of surgery, fracture, risk of potential future arthritis, swelling, injury to surrounding structures/nerve/artery/vein, failure of medical implants or surgical instruments, retained hardware and/or foreign body, and continued pain/dysfunction/disability. We discussed the expected timeline for recovery including the timeline for return to work and sporting activities. The patient expressed good understanding and elected to proceed. They will meet be scheduled at a mutually convenient time in the near future.     We discussed that smoking does place him at elevated risk of failure of healing, and encouraged the patient to work on quitting leading up to and for at least 6 weeks after surgery. He is not interested in referral to [facility] today. We also discussed obtaining pre-op labs and EKG, which were ordered  today.     A post-op sling was provided today for him to bring to the hospital on the day of surgery. We discussed that the patient will spend 6 weeks in a sling at all times, including sleep, except for hygiene. I recommend that they do not drive while in the sling. He will start PT per protocol 5-7 days post-op, which will be provided on the day of surgery. We discussed post-op medications for pain and Vitamin D supplementation to promote tendon healing. In the meantime, the patient can use ice/heat and over-the-counter medications as needed for pain.    Follow up 10-14 days after surgery.      Orders:    Ambulatory referral to Orthopedic Surgery    Sling    Basic metabolic panel; Future    CBC and differential; Future    ECG 12 lead; Future    Hemoglobin A1C; Future    Nontraumatic incomplete tear of right rotator cuff  We reviewed Mary's right shoulder MRI today, which demonstrates partial-thickness rotator cuff tearing and subacromial bursitis. The patient has previously completed an extensive course of formal PT without symptomatic benefit. He has not previously undergone injections into this shoulder. As previously discussed with Dr. Wing, I would recommend continued non-operative treatment at this time as he is going to undergo left shoulder surgery in the near future. This includes considering steroid injection, OTC medications and ice/heat as needed, and activities as tolerated. After discussing the risks, benefits, and alternatives to injections, the patient consented for and underwent the procedure without any acute complications or difficulties. Post-injection instructions were reviewed. We will plan to monitor his symptoms as we follow up with Mary for his left shoulder. He may consider a U/S-guided biceps tendon sheath steroid injection with Dr. Wing depending on symptomatic relief from the subacromial CSI. The biceps tendon injection did provide adequate relief for similar symptoms on the  "contralateral side.          _____________________________________________________  CHIEF COMPLAINT:  Chief Complaint   Patient presents with    Left Shoulder - Pain    Right Shoulder - Pain         SUBJECTIVE:  Mary Russo is a right hand dominant 64 y.o. male who presents for initial evaluation to our clinic for bilateral shoulder pain, left > right. The patient has been treated by Dr. Wing for both shoulder with formal PT, left subacromial and U/S-guided biceps tendon sheath steroid injections, and OTC medications so far. The biceps injection provided great relief for 6 months, but the patient notes persistent pain. Dr. Wing did order bilateral shoulder MRIs, which he is here to review and discuss surgery for his left shoulder today. The patient is active around the house at baseline, stating he constantly works outside. The patient notes he cut down huge loren trees in his yard that were dead; he believes this may have worsened or flared his symptoms. Otherwise, he denies recent injury/trauma or numbness/tingling. Pain is worst with overhead motions and shoulder abduction. He notes some extent of weakness worse in the left than the right.     The patient smokes 5-6 cigarettes daily. He denies cardiopulmonary disease or diabetes.    Shoulder Surgical History:  None    PAST MEDICAL HISTORY:  Past Medical History:   Diagnosis Date    Lyme disease        PAST SURGICAL HISTORY:  Past Surgical History:   Procedure Laterality Date    BACK SURGERY         FAMILY HISTORY:  Family History   Family history unknown: Yes       SOCIAL HISTORY:  Social History     Tobacco Use    Smoking status: Every Day     Types: Cigarettes     Passive exposure: Past    Smokeless tobacco: Never   Vaping Use    Vaping status: Never Used   Substance Use Topics    Alcohol use: Yes    Drug use: Never       MEDICATIONS:    Current Outpatient Medications:     B-D 3CC LUER-NATANAEL SYR [id number]/2 23G X 1-1/2\" 3 ML MISC, use 1 every week (Patient not " "taking: Reported on 2/7/2025), Disp: , Rfl:     BD Disp Needle 23G X 1\" MISC, use 1 every week (Patient not taking: Reported on 2/7/2025), Disp: , Rfl:     sildenafil (VIAGRA) 50 MG tablet, Take 50 mg by mouth (Patient not taking: Reported on 2/7/2025), Disp: , Rfl:     tadalafil (CIALIS) 5 MG tablet, Take 5 mg by mouth daily (Patient not taking: Reported on 3/4/2025), Disp: , Rfl:     testosterone cypionate (DEPO-TESTOSTERONE) 200 mg/mL SOLN, Inject 0.5 mL (100 mg total) into a muscle every 7 days (Patient not taking: Reported on 2/7/2025), Disp: 10 mL, Rfl: 0    tinidazole (TINDAMAX) 500 MG tablet, Take 500 mg by mouth daily (Patient not taking: Reported on 8/15/2024), Disp: , Rfl:     ALLERGIES:  No Known Allergies    LABS:  HgA1c:   Lab Results   Component Value Date    HGBA1C 5.6 01/30/2025     BMP:   Lab Results   Component Value Date    K 4.7 01/30/2025    CO2 21 01/30/2025     01/30/2025    BUN 18 01/30/2025    CREATININE 0.90 01/30/2025     CBC: No components found for: \"CBC\"    _____________________________________________________  Review of systems: ROS is negative other than that noted in the HPI.  Constitutional: Negative for fatigue and fever.   HENT: Negative for sore throat.    Respiratory: Negative for shortness of breath.    Cardiovascular: Negative for chest pain.   Gastrointestinal: Negative for abdominal pain.   Endocrine: Negative for cold intolerance and heat intolerance.   Genitourinary: Negative for flank pain.   Musculoskeletal: Negative for back pain.   Skin: Negative for rash.   Allergic/Immunologic: Negative for immunocompromised state.   Neurological: Negative for dizziness.   Psychiatric/Behavioral: Negative for agitation.     Left Shoulder Exam:     Inspection: No ecchymosis, edema, or deformity. No visualized wounds or skin lesions   Palpation: moderate bicipital groove tenderness. no AC joint TTP  Active Motion:       FF: 90 actively with pain        ER: 45        IR: T12 with " pain  Strength: 4+/5 empty can, 5/5 ER,  5/5 IR   Sensory - SILT in the Radial / Ulnar / Median / Axillary nerve distributions  Motor - AIN / PIN / Radial / Ulnar / Median / Axillary motor nerves in tact  Palpable Radial pulse  Cap refill <2secs in all digits    Left: 4+/5 Belly Press with pain; Right 5/5 Belly Press with pain    Right Shoulder Exam:     Inspection: No ecchymosis, edema, or deformity. No visualized wounds or skin lesions   Palpation: moderate bicipital groove tenderness. no AC joint TTP  Active Motion:       FF: 120+ with pain        ER: 40        IR: T12 with pain  Strength: 4+/5 empty can with pain, 5/5 ER,  5/5 IR   Sensory - SILT in the Radial / Ulnar / Median / Axillary nerve distributions  Motor - AIN / PIN / Radial / Ulnar / Median / Axillary motor nerves in tact  Palpable Radial pulse  Cap refill <2secs in all digits    5/5 Belly Press with pain    Physical exam:  General/Constitutional: NAD, well developed, well nourished  HENT: Normocephalic, atraumatic  CV: Intact distal pulses, regular rate  Resp: No respiratory distress or labored breathing  Abdomen: soft, nondistended   Lymphatic: No lymphadenopathy palpated  Neuro: Alert and Oriented x 3, no focal deficits  Psych: Normal mood, normal affect  Skin: Warm, dry, no rashes, no erythema  _____________________________________________________  STUDIES REVIEWED:  MRI of the left shoulder reviewed and interpreted, which demonstrates IMPRESSION:     In the left shoulder, full-thickness insertional tear in the posterior supraspinatus, spanning 1.2 cm anterior to posterior. No tendon retraction or muscle atrophy (series 6 images 9-12.)     Moderate infraspinatus tendinosis without tear.     Small posterior glenoid labral tear (Series 4 image 17.)        MRI of the right shoulder reviewed and interpreted, which demonstrates IMPRESSION:     In the right shoulder, partial thickness (1/3) bursal surface insertional tear in the posterior supraspinatus,  spanning 0.5 cm anterior to posterior. No tendon retraction or muscle atrophy (series 6 image 12 and series 7 image 24.)     SLAP tear of the glenoid labrum (series 6 images 10-14.)     Moderate subcoracoid bursitis (series 7 image 10.)      PROCEDURES PERFORMED:  Large joint arthrocentesis: R subacromial bursa  Universal Protocol:  Consent: Verbal consent obtained.  Risks and benefits: risks, benefits and alternatives were discussed  Consent given by: patient  Timeout called at: 4/3/2025 2:45 PM.  Patient understanding: patient states understanding of the procedure being performed  Patient consent: the patient's understanding of the procedure matches consent given  Site marked: the operative site was marked  Radiology Images displayed and confirmed. If images not available, report reviewed: imaging studies available  Patient identity confirmed: verbally with patient  Supporting Documentation  Indications: pain   Procedure Details  Location: shoulder - R subacromial bursa  Preparation: Patient was prepped and draped in the usual sterile fashion  Needle size: 22 G  Approach: posterior  Medications administered: 40 mg triamcinolone acetonide 40 mg/mL; 4 mL ropivacaine 0.5 %    Patient tolerance: patient tolerated the procedure well with no immediate complications  Dressing:  Sterile dressing applied           Scribe Attestation      I,:  Graciela Bui PA-C am acting as a scribe while in the presence of the attending physician.:       I,:  Lukasz Jin MD personally performed the services described in this documentation    as scribed in my presence.:

## 2025-04-04 ENCOUNTER — TELEPHONE (OUTPATIENT)
Dept: UROLOGY | Facility: CLINIC | Age: 65
End: 2025-04-04

## 2025-04-04 NOTE — TELEPHONE ENCOUNTER
Patient was seen in office 4/3 with Dr Tovar for Biopsy.  He will need a two week visit for results.  OVL as of now there are no openings.  I put patient on schedule for Tuesday Sept 13th.  Please advise.    Patient also stated he can only make Thursdays, after I gave him the May 13th date    Thank you

## 2025-04-07 PROCEDURE — G0416 PROSTATE BIOPSY, ANY MTHD: HCPCS | Performed by: STUDENT IN AN ORGANIZED HEALTH CARE EDUCATION/TRAINING PROGRAM

## 2025-04-07 PROCEDURE — 88344 IMHCHEM/IMCYTCHM EA MLT ANTB: CPT | Performed by: STUDENT IN AN ORGANIZED HEALTH CARE EDUCATION/TRAINING PROGRAM

## 2025-04-07 PROCEDURE — 88342 IMHCHEM/IMCYTCHM 1ST ANTB: CPT | Performed by: STUDENT IN AN ORGANIZED HEALTH CARE EDUCATION/TRAINING PROGRAM

## 2025-04-08 RX ORDER — TRIAMCINOLONE ACETONIDE 40 MG/ML
40 INJECTION, SUSPENSION INTRA-ARTICULAR; INTRAMUSCULAR
Status: COMPLETED | OUTPATIENT
Start: 2025-04-03 | End: 2025-04-03

## 2025-04-08 RX ORDER — ROPIVACAINE HYDROCHLORIDE 5 MG/ML
4 INJECTION, SOLUTION EPIDURAL; INFILTRATION; PERINEURAL
Status: COMPLETED | OUTPATIENT
Start: 2025-04-03 | End: 2025-04-03

## 2025-04-10 ENCOUNTER — TELEPHONE (OUTPATIENT)
Dept: FAMILY MEDICINE CLINIC | Facility: CLINIC | Age: 65
End: 2025-04-10

## 2025-04-10 NOTE — TELEPHONE ENCOUNTER
----- Message from LIZA Gary sent at 4/8/2025  1:01 PM EDT -----  Regarding: pre op  Pt is scheduled for pre op appt on 4/17 (surgery scheduled 5/7)  Please call to advise needs to have pre op labs done and resulted prior to appt or cannot be cleared for surgery

## 2025-04-10 NOTE — TELEPHONE ENCOUNTER
Pt is scheduled for pre op appt on 4/17.   Please call.   He needs to have all labs done prior to appt for pre op (normally CBC, CMP, and bleeding times) ordered by surgeon.   Results must be available for pre op appt.

## 2025-04-10 NOTE — TELEPHONE ENCOUNTER
Spoke to patient and reminded him that labs ordered by ortho need to be done prior to his preop appt on 4/17

## 2025-04-12 LAB
BASOPHILS # BLD AUTO: 0.1 X10E3/UL (ref 0–0.2)
BASOPHILS NFR BLD AUTO: 1 %
BUN SERPL-MCNC: 26 MG/DL (ref 8–27)
BUN/CREAT SERPL: 30 (ref 10–24)
CALCIUM SERPL-MCNC: 9.8 MG/DL (ref 8.6–10.2)
CHLORIDE SERPL-SCNC: 104 MMOL/L (ref 96–106)
CO2 SERPL-SCNC: 21 MMOL/L (ref 20–29)
CREAT SERPL-MCNC: 0.87 MG/DL (ref 0.76–1.27)
EGFR: 97 ML/MIN/1.73
EOSINOPHIL # BLD AUTO: 0.2 X10E3/UL (ref 0–0.4)
EOSINOPHIL NFR BLD AUTO: 2 %
ERYTHROCYTE [DISTWIDTH] IN BLOOD BY AUTOMATED COUNT: 12.9 % (ref 11.6–15.4)
EST. AVERAGE GLUCOSE BLD GHB EST-MCNC: 120 MG/DL
GLUCOSE SERPL-MCNC: 96 MG/DL (ref 70–99)
HBA1C MFR BLD: 5.8 % (ref 4.8–5.6)
HCT VFR BLD AUTO: 43.6 % (ref 37.5–51)
HGB BLD-MCNC: 15.1 G/DL (ref 13–17.7)
IMM GRANULOCYTES # BLD: 0.2 X10E3/UL (ref 0–0.1)
IMM GRANULOCYTES NFR BLD: 2 %
LYMPHOCYTES # BLD AUTO: 2.8 X10E3/UL (ref 0.7–3.1)
LYMPHOCYTES NFR BLD AUTO: 20 %
MCH RBC QN AUTO: 30.5 PG (ref 26.6–33)
MCHC RBC AUTO-ENTMCNC: 34.6 G/DL (ref 31.5–35.7)
MCV RBC AUTO: 88 FL (ref 79–97)
MONOCYTES # BLD AUTO: 0.8 X10E3/UL (ref 0.1–0.9)
MONOCYTES NFR BLD AUTO: 6 %
NEUTROPHILS # BLD AUTO: 9.5 X10E3/UL (ref 1.4–7)
NEUTROPHILS NFR BLD AUTO: 69 %
PLATELET # BLD AUTO: 263 X10E3/UL (ref 150–450)
POTASSIUM SERPL-SCNC: 4.6 MMOL/L (ref 3.5–5.2)
RBC # BLD AUTO: 4.95 X10E6/UL (ref 4.14–5.8)
SODIUM SERPL-SCNC: 140 MMOL/L (ref 134–144)
WBC # BLD AUTO: 13.6 X10E3/UL (ref 3.4–10.8)

## 2025-04-17 ENCOUNTER — TELEPHONE (OUTPATIENT)
Age: 65
End: 2025-04-17

## 2025-04-17 NOTE — TELEPHONE ENCOUNTER
Please call  Pt had extensive tick borne panel done in January and everything was normal.   Fatigue can be caused by multiple things. His testosterone level is low which can cause some fatigue. and is seeing urology who is managing that. He also had positive rheumatoid factor so he was referred to rheumatology to evaluate and manage that. Not sure if he has had that appt yet.   There is no indication for him to be treated with neurology for history of lyme with all negative labs and no residual symptoms from bells palsy.   I do not know of an accupuncturist. He can check with his insurance.

## 2025-04-17 NOTE — TELEPHONE ENCOUNTER
Caller: patient     Doctor: Dr. Jin     Reason for call: patient asking where he should go for the EKG    Call back#: [phone]

## 2025-04-17 NOTE — TELEPHONE ENCOUNTER
Caller: Patient     Doctor: Dr. Jin    Reason for call: Patient calling back regarding his EKG. Informed patient he can go to the [facility]. No appointment needed and I also gave him the hours of operation.

## 2025-04-17 NOTE — TELEPHONE ENCOUNTER
Pt's friend called to request to have Yamila LIZA place a referral to neurology for pt.  He had Lyme disease a few years ago with Bell's palsy and was supposed to make an appt with neurology, but he never did.  She would like him to see neurology.  He is very tired all the time and doesn't feel capable of working, and they are wondering if it is the Lyme again.  Pt does have an appt next week for pre-op clearance, in case Yamila LIZA would like to discuss it first.  She also asked if there is an accupuncturist he could see.   Please call pt to advise.

## 2025-04-22 ENCOUNTER — TELEPHONE (OUTPATIENT)
Dept: FAMILY MEDICINE CLINIC | Facility: CLINIC | Age: 65
End: 2025-04-22

## 2025-04-22 NOTE — TELEPHONE ENCOUNTER
LMOM for patient to call us back to let us know if he had or is planning on getting his EKG done today.  Must be done and resulted in preparation for preop appt on 4/24.

## 2025-04-22 NOTE — TELEPHONE ENCOUNTER
Appointment was R/S to give patient enough time to have EKG done and resulted before appointment   NFA

## 2025-04-22 NOTE — TELEPHONE ENCOUNTER
Pt is scheduled for pre op appt on 4/24  Labs are done, but the EKG has still not been resulted.   Please call pt to see if he had it done.   He needs to have it done and RESULTED prior to appt.

## 2025-04-22 NOTE — TELEPHONE ENCOUNTER
Patient called back and stated he needs to take his family member to the hospital tomorrow and was wondering if he could get his EKG done tomorrow? Please call patient back if not enough time.

## 2025-04-23 ENCOUNTER — OFFICE VISIT (OUTPATIENT)
Dept: LAB | Facility: HOSPITAL | Age: 65
End: 2025-04-23
Payer: COMMERCIAL

## 2025-04-23 DIAGNOSIS — M75.22 BICEPS TENDINITIS OF LEFT SHOULDER: ICD-10-CM

## 2025-04-23 DIAGNOSIS — Z01.818 PRE-OP EVALUATION: ICD-10-CM

## 2025-04-23 DIAGNOSIS — M75.122 NONTRAUMATIC COMPLETE TEAR OF LEFT ROTATOR CUFF: ICD-10-CM

## 2025-04-23 PROCEDURE — 93005 ELECTROCARDIOGRAM TRACING: CPT

## 2025-04-25 ENCOUNTER — TELEPHONE (OUTPATIENT)
Dept: FAMILY MEDICINE CLINIC | Facility: CLINIC | Age: 65
End: 2025-04-25

## 2025-04-25 LAB
ATRIAL RATE: 69 BPM
P AXIS: 70 DEGREES
PR INTERVAL: 148 MS
QRS AXIS: 44 DEGREES
QRSD INTERVAL: 88 MS
QT INTERVAL: 390 MS
QTC INTERVAL: 418 MS
T WAVE AXIS: 54 DEGREES
VENTRICULAR RATE: 69 BPM

## 2025-04-25 PROCEDURE — 93010 ELECTROCARDIOGRAM REPORT: CPT | Performed by: INTERNAL MEDICINE

## 2025-04-25 NOTE — TELEPHONE ENCOUNTER
Pt is scheduled for pre op appt on 4/28  EKG still not done. Was ordered by surgeon. Pt has been advised several times that this EKG had to be done and resulted prior to appt or pre op cannot be done.   Please find out if he had EKG done. If it was done, we will call hospital to get results.   If not done, need to reschedule pre op appt.

## 2025-04-28 ENCOUNTER — OFFICE VISIT (OUTPATIENT)
Dept: FAMILY MEDICINE CLINIC | Facility: CLINIC | Age: 65
End: 2025-04-28
Payer: COMMERCIAL

## 2025-04-28 VITALS
OXYGEN SATURATION: 97 % | SYSTOLIC BLOOD PRESSURE: 136 MMHG | WEIGHT: 161.8 LBS | HEART RATE: 68 BPM | HEIGHT: 65 IN | BODY MASS INDEX: 26.96 KG/M2 | TEMPERATURE: 97.7 F | RESPIRATION RATE: 18 BRPM | DIASTOLIC BLOOD PRESSURE: 80 MMHG

## 2025-04-28 DIAGNOSIS — M75.22 BICEPS TENDINITIS OF LEFT SHOULDER: ICD-10-CM

## 2025-04-28 DIAGNOSIS — Z01.818 PRE-OP EXAMINATION: ICD-10-CM

## 2025-04-28 DIAGNOSIS — M75.122 NONTRAUMATIC COMPLETE TEAR OF LEFT ROTATOR CUFF: Primary | ICD-10-CM

## 2025-04-28 PROCEDURE — 99214 OFFICE O/P EST MOD 30 MIN: CPT | Performed by: NURSE PRACTITIONER

## 2025-04-28 NOTE — PATIENT INSTRUCTIONS
Pre-operative Medication Instructions    Avoid herbs or non-directed vitamins one week prior to surgery  Avoid aspirin containing medications or non-steroidal anti-inflammatory drugs one week preceding surgery  May take tylenol for pain up until the night before surgery    Testosterone     Medication Name     testosterone cypionate (DEPO-TESTOSTERONE) 200 mg/mL SOLN      Continue to take this medication on your normal schedule.  If this is an oral medication and you take it in the morning, then you may take this medicine with a sip of water.  This medication may need to be discontinued for a least 4 weeks prior to your surgery if the surgical procedure is associated with a high risk of blod clots as in hip or knee replacement for example.  Please consult with your Surgeon to discuss discontinuing this medication before your surgery.

## 2025-04-28 NOTE — PROGRESS NOTES
Pre-operative Clearance  Name: Mary Russo      : 1960      MRN: [id number]  Encounter Provider: LIZA Gary  Encounter Date: 2025   Encounter department: [facility]    :  Assessment & Plan  Nontraumatic complete tear of left rotator cuff  Surgical repair scheduled. Managed by ortho       Biceps tendinitis of left shoulder  Surgical repair scheduled. Managed by ortho       Pre-op examination  Medically cleared for surgery             Pre-operative Clearance:     Revised Cardiac Risk Index:  RCI RISK CLASS II (1 risk factor, risk of major cardiac complications approximately 1.3%)    Clearance:  Patient is medically optimized (CLEARED) for proposed surgery without any additional cardiac testing.      Medication Instructions:   - Avoid herbs or non-directed vitamins one week prior to surgery    - Avoid aspirin containing medications or non-steroidal anti-inflammatory drugs one week preceding surgery    - May take tylenol for pain up until the night before surgery    - Testosterone: Continue to take this medication on your normal schedule. This medication may need to be discontinued for at least 4 weeks prior to surgery if the surgical procedure is associated with high risk of blood clots. Consult with your surgeon.       History of Present Illness     Pre-Op Examination     Surgery: left shoulder arthroscopy   Anticipated Date of Surgery: 2025   Surgeon: Dr Malcolm Jin     Previous history of bleeding disorders or clots?: No    Previous Anesthesia reaction?: No    Prolonged steroid use in the last 6 months?: No      Assessment of Cardiac Risk:   - Unstable or severe angina or MI in the last 6 weeks or history of stent placement in the last year?: No    - Decompensated heart failure (e.g. New onset heart failure, NYHA  Class IV heart failure, or worsening existing heart failure)?: No    - Significant arrhythmias such as high grade AV block, symptomatic ventricular  arrhythmia, newly recognized ventricular tachycardia, supraventricular tachycardia with resting heart rate >100, or symptomatic bradycardia?: No    - Severe heart valve disease including aortic stenosis or symptomatic mitral stenosis?: No      Pre-operative Risk Factors:  - Elevated-risk surgery: Yes    - History of cerebrovascular disease: No    - History of ischemic heart disease: No    - History of congestive heart failure: No    - Pre-operative treatment with insulin: No    - Pre-operative creatinine >2 mg/dL: No      Duke Activity Status Index (DASI):    - DASI Total Score: 58.2   - METs: 9.9     Review of Systems   Constitutional:  Negative for fatigue.   HENT:  Negative for congestion, sinus pressure and sinus pain.    Eyes:  Negative for discharge and visual disturbance.   Respiratory:  Negative for cough, chest tightness and shortness of breath.    Cardiovascular:  Negative for chest pain and palpitations.   Gastrointestinal:  Negative for abdominal pain, diarrhea, nausea and vomiting.   Endocrine: Negative for polydipsia and polyuria.   Genitourinary:  Negative for dysuria.   Musculoskeletal:  Positive for arthralgias (left shoulder) and myalgias (left upper arm).   Skin:  Negative for rash and wound.   Allergic/Immunologic: Negative for immunocompromised state.   Neurological:  Negative for dizziness, seizures and headaches.   Hematological:  Negative for adenopathy.   Psychiatric/Behavioral:  Negative for dysphoric mood. The patient is not nervous/anxious.      Past Medical History   Past Medical History:   Diagnosis Date    Lyme disease      Past Surgical History:   Procedure Laterality Date    BACK SURGERY       Family History   Family history unknown: Yes     Social History     Tobacco Use    Smoking status: Every Day     Types: Cigarettes     Passive exposure: Past    Smokeless tobacco: Never   Vaping Use    Vaping status: Never Used   Substance and Sexual Activity    Alcohol use: Yes    Drug use:  "Never    Sexual activity: Not on file     Current Outpatient Medications on File Prior to Visit   Medication Sig    B-D 3CC LUER-NATANAEL SYR 37IW2-0/2 23G X 1-1/2\" 3 ML MISC use 1 every week (Patient not taking: Reported on 2/7/2025)    BD Disp Needle 23G X 1\" MISC use 1 every week (Patient not taking: Reported on 2/7/2025)    Omega-3 Fatty Acids (FISH OIL PO) Take by mouth (Patient not taking: Reported on 4/28/2025)    sildenafil (VIAGRA) 50 MG tablet Take 50 mg by mouth (Patient not taking: Reported on 4/28/2025)    tadalafil (CIALIS) 5 MG tablet Take 5 mg by mouth daily (Patient not taking: Reported on 3/4/2025)    testosterone cypionate (DEPO-TESTOSTERONE) 200 mg/mL SOLN Inject 0.5 mL (100 mg total) into a muscle every 7 days (Patient not taking: Reported on 2/7/2025)    tinidazole (TINDAMAX) 500 MG tablet Take 500 mg by mouth daily (Patient not taking: Reported on 8/15/2024)    VITAMIN D, CHOLECALCIFEROL, PO Take by mouth (Patient not taking: Reported on 4/28/2025)     No Known Allergies  Objective   /80   Pulse 68   Temp 97.7 °F (36.5 °C)   Resp 18   Ht 5' 5\" (1.651 m)   Wt 73.4 kg (161 lb 12.8 oz)   SpO2 97%   BMI 26.92 kg/m²     Recent Results (from the past 4 weeks)   Tissue Exam    Collection Time: 04/03/25  3:59 PM   Result Value Ref Range    Case Report       Surgical Pathology Report                         Case: [id number]                                  Authorizing Provider:  Nathan Tovar MD      Collected:           04/03/2025 4639              Ordering Location:     [facility] for       Received:            04/03/2025 5984                                     [facility]                                                               Pathologist:           Jamel Garcia MD                                                          Specimens:   A) - Prostate, Right Lateral [place]                                                                   B) - Prostate, Right Lateral Medium "                                                                 C) - Prostate, Right Lateral Base                                                                   D) - Prostate, Right [place]                                                                           E) - Prostate, Right Base                                                                            F) - Prostate, Left Lateral [place]                                                                    G) - Prostate, Left Lateral Medium                                                                  H) - Prostate, Left Lateral Base                                                                    I) - Prostate, Left [place]                                                                            J) - Prostate, Left Base                                                                   Final Diagnosis       A. Prostate, Right Lateral [place]:  - Benign prostate tissue.     B. Prostate, Right Lateral Medium:  - Benign prostate tissue.     C. Prostate, Right Lateral Base:  - Benign prostate tissue.     D. Prostate, Right [place]:  - Benign prostate tissue.     E. Prostate, Right Base:  - Benign prostate tissue.    Comment: Immunohistochemistry for a prostate multiplex stain (p63, K903, and P504S) demonstrates benign epithelial elements.       F. Prostate, Left Lateral [place]:  - Benign prostate tissue with acute and chronic inflammation.    Comment: Immunohistochemistry for a prostate multiplex stain (p63, K903, and P504S) demonstrates benign epithelial elements. NKX3.1 is appropriately positive.        G. Prostate, Left Lateral Medium:  - Benign prostate tissue.    Comment: Immunohistochemistry for a prostate multiplex stain (p63, K903, and P504S) demonstrates benign epithelial elements.     H. Prostate, Left Lateral Base:  - Benign prostate tissue with chronic inflammation.     I. Prostate, Left [place]:  - Benign prostate tissue with chronic inflammation.    Comment:  "Immunohistochemistry for a prostate multiplex stain (p63, K903, and P504S) demonstrates benign epithelial elements.     J. Prostate, Left Base:  - Benign prostate tissue.           Additional Information       All reported additional testing was performed with appropriately reactive controls.  These tests were developed and their performance characteristics determined by [facility] or appropriate performing facility, though some tests may be performed on tissues which have not been validated for performance characteristics (such as staining performed on alcohol exposed cell blocks and decalcified tissues).  Results should be interpreted with caution and in the context of the patients’ clinical condition. These tests may not be cleared or approved by the U.S. Food and Drug Administration, though the FDA has determined that such clearance or approval is not necessary. These tests are used for clinical purposes and they should not be regarded as investigational or for research. This laboratory has been approved by CLIA 88, designated as a high-complexity laboratory and is qualified to perform these tests.  .Interpretation performed at [facility], [address] [place] [address]        Gross Description       A. The specimen is received in formalin, labeled with the patient's name and hospital number, and is designated \" prostate-right lateral apex\".  The specimen consists of a single tan core of filiform and friable soft tissue measuring less than 0.1 cm in diameter and 1.7 cm in length. Entirely submitted. One cassette. Between sponges.  Note: due to the size and consistency of the specimen, the tissue may not survive histologic processing.  B. The specimen is received in formalin, labeled with the patient's name and hospital number, and is designated \" prostate-right lateral medium\".  The specimen consists of a single tan core of filiform and friable soft tissue measuring less than 0.1 cm " "in diameter and 1.7 cm in length. Entirely submitted. One cassette. Between sponges.  Note: due to the size and consistency of the specimen, the tissue may not survive histologic processing.  C. The specimen is received in formalin, labeled with the patient's name and hospital number, and is designated \" prostate-right lateral base\".  The specimen consists of a single tan core of filiform and friable soft tissue measuring less than 0.1 cm in diameter and 1.1 cm in length. Entirely submitted. One cassette. Between sponges.  Note: due to the size and consistency of the specimen, the tissue may not survive histologic processing.  D. The specimen is received in formalin, labeled with the patient's name and hospital number, and is designated \" prostate-right apex\".  The specimen consists of a single tan core of filiform and friable soft tissue measuring less than 0.1 cm in diameter and 1.3 cm in length. Entirely submitted. One cassette. Between sponges.  Note: due to the size and consistency of the specimen, the tissue may not survive histologic processing.  E. The specimen is received in formalin, labeled with the patient's name and hospital number, and is designated \" prostate-right base\".  The specimen consists of a single tan core of filiform and friable soft tissue measuring less than 0.1 cm in diameter and 2.5 cm in length. Entirely submitted. One cassette. Between sponges.  Note: due to the size and consistency of the specimen, the tissue may not survive histologic processing.  F. The specimen is received in formalin, labeled with the patient's name and hospital number, and is designated \" prostate-left lateral apex\".  The specimen consists of a single tan core of filiform and friable soft tissue measuring less than 0.1 cm in diameter and 0.7 cm in length. Entirely submitted. One cassette. Between sponges.  Note: due to the size and consistency of the specimen, the tissue may not survive histologic processing.  G. " "The specimen is received in formalin, labeled with the patient's name and hospital number, and is designated \" prostate-left lateral medium\".  The specimen consists of a single tan core of filiform and friable soft tissue measuring less than 0.1 cm in diameter and 1.6 cm in length. Entirely submitted. One cassette. Between sponges.  Note: due to the size and consistency of the specimen, the tissue may not survive histologic processing.  H. The specimen is received in formalin, labeled with the patient's name and hospital number, and is designated \" prostate-left lateral base\".  The specimen consists of a single tan core of filiform and friable soft tissue measuring less than 0.1 cm in diameter and 1.3 cm in length. Entirely submitted. One cassette. Between sponges.  Note: due to the size and consistency of the specimen, the tissue may not survive histologic processing.  I. The specimen is received in formalin, labeled with the patient's name and hospital number, and is designated \" prostate-left apex\".  The specimen consists of a single tan core of filiform and friable soft tissue measuring less than 0.1 cm in diameter and 1.3 cm in length. Entirely submitted. One cassette. Between sponges.  Note: due to the size and consistency of the specimen, the tissue may not survive histologic processing.  J. The specimen is received in formalin, labeled with the patient's name and hospital number, and is designated \" prostate-left base\".\".  The specimen consists of a single tan core of filiform and friable soft tissue measuring less than 0.1 cm in diameter and 1.7 cm in length. Entirely submitted. One cassette. Between sponges.  Note: due to the size and consistency of the specimen, the tissue may not survive histologic processing.    Note: The estimated total formalin fixation time based upon information provided by the submitting clinician and the standard processing schedule is 10.25 hours.    -KEmeigh     Basic metabolic " panel    Collection Time: 04/11/25 11:18 AM   Result Value Ref Range    Glucose, Random 96 70 - 99 mg/dL    BUN 26 8 - 27 mg/dL    Creatinine 0.87 0.76 - 1.27 mg/dL    eGFR 97 >59 mL/min/1.73    SL AMB BUN/CREATININE RATIO 30 (H) 10 - 24    Sodium 140 134 - 144 mmol/L    Potassium 4.6 3.5 - 5.2 mmol/L    Chloride 104 96 - 106 mmol/L    CO2 21 20 - 29 mmol/L    CALCIUM 9.8 8.6 - 10.2 mg/dL   CBC and differential    Collection Time: 04/11/25 11:18 AM   Result Value Ref Range    White Blood Cell Count 13.6 (H) 3.4 - 10.8 x10E3/uL    Red Blood Cell Count 4.95 4.14 - 5.80 x10E6/uL    Hemoglobin 15.1 13.0 - 17.7 g/dL    HCT 43.6 37.5 - 51.0 %    MCV 88 79 - 97 fL    MCH 30.5 26.6 - 33.0 pg    MCHC 34.6 31.5 - 35.7 g/dL    RDW 12.9 11.6 - 15.4 %    Platelet Count 263 150 - 450 x10E3/uL    Neutrophils 69 Not Estab. %    Lymphocytes 20 Not Estab. %    Monocytes 6 Not Estab. %    Eosinophils 2 Not Estab. %    Basophils PCT 1 Not Estab. %    Neutrophils (Absolute) 9.5 (H) 1.4 - 7.0 x10E3/uL    Lymphocytes (Absolute) 2.8 0.7 - 3.1 x10E3/uL    Monocytes (Absolute) 0.8 0.1 - 0.9 x10E3/uL    Eosinophils (Absolute) 0.2 0.0 - 0.4 x10E3/uL    Basophils ABS 0.1 0.0 - 0.2 x10E3/uL    Immature Granulocytes 2 Not Estab. %    Immature Granulocytes (Absolute) 0.2 (H) 0.0 - 0.1 x10E3/uL   Hemoglobin A1C    Collection Time: 04/11/25 11:18 AM   Result Value Ref Range    Hemoglobin A1C 5.8 (H) 4.8 - 5.6 %    Estimated Average Glucose 120 mg/dL   ECG 12 lead    Collection Time: 04/23/25  2:43 PM   Result Value Ref Range    Ventricular Rate 69 BPM    Atrial Rate 69 BPM    OH Interval 148 ms    QRSD Interval 88 ms    QT Interval 390 ms    QTC Interval 418 ms    P Axis 70 degrees    QRS Axis 44 degrees    T Wave Axis 54 degrees   EKG  4/23/2025  NSR, normal EKG, read by Dr. Dale    Physical Exam  Vitals reviewed.   Constitutional:       General: He is not in acute distress.     Appearance: Normal appearance. He is well-developed. He is not  ill-appearing.   HENT:      Head: Normocephalic and atraumatic.      Right Ear: Tympanic membrane and ear canal normal.      Left Ear: Tympanic membrane and ear canal normal.      Nose: Nose normal.      Mouth/Throat:      Mouth: Mucous membranes are moist.      Pharynx: Oropharynx is clear.   Eyes:      General: No scleral icterus.     Extraocular Movements: Extraocular movements intact.      Pupils: Pupils are equal, round, and reactive to light.   Neck:      Thyroid: No thyromegaly.      Vascular: No carotid bruit.   Cardiovascular:      Rate and Rhythm: Normal rate and regular rhythm.      Heart sounds: No murmur heard.  Pulmonary:      Effort: Pulmonary effort is normal. No respiratory distress.      Breath sounds: Normal breath sounds. No wheezing or rales.   Abdominal:      General: Bowel sounds are normal. There is no distension.      Palpations: Abdomen is soft.      Tenderness: There is no abdominal tenderness.   Musculoskeletal:         General: Normal range of motion.      Cervical back: Normal range of motion and neck supple.   Lymphadenopathy:      Cervical: No cervical adenopathy.   Skin:     General: Skin is warm and dry.      Coloration: Skin is not jaundiced or pale.   Neurological:      General: No focal deficit present.      Mental Status: He is alert and oriented to person, place, and time.      Cranial Nerves: No cranial nerve deficit.      Sensory: No sensory deficit.      Coordination: Coordination normal.      Gait: Gait normal.   Psychiatric:         Mood and Affect: Mood normal.         Behavior: Behavior normal.         Thought Content: Thought content normal.         Judgment: Judgment normal.           LIZA Gary

## 2025-05-08 ENCOUNTER — PREP FOR PROCEDURE (OUTPATIENT)
Dept: OBGYN CLINIC | Facility: CLINIC | Age: 65
End: 2025-05-08

## 2025-05-08 DIAGNOSIS — M75.122 NONTRAUMATIC COMPLETE TEAR OF LEFT ROTATOR CUFF: Primary | ICD-10-CM

## 2025-05-08 RX ORDER — CHLORHEXIDINE GLUCONATE ORAL RINSE 1.2 MG/ML
15 SOLUTION DENTAL ONCE
Status: CANCELLED | OUTPATIENT
Start: 2025-05-16 | End: 2025-05-08

## 2025-05-08 RX ORDER — CEFAZOLIN SODIUM 2 G/50ML
2000 SOLUTION INTRAVENOUS ONCE
Status: CANCELLED | OUTPATIENT
Start: 2025-05-16 | End: 2025-05-08

## 2025-05-08 RX ORDER — SODIUM CHLORIDE, SODIUM LACTATE, POTASSIUM CHLORIDE, CALCIUM CHLORIDE 600; 310; 30; 20 MG/100ML; MG/100ML; MG/100ML; MG/100ML
20 INJECTION, SOLUTION INTRAVENOUS CONTINUOUS
Status: CANCELLED | OUTPATIENT
Start: 2025-05-16

## 2025-05-13 ENCOUNTER — OFFICE VISIT (OUTPATIENT)
Dept: UROLOGY | Facility: CLINIC | Age: 65
End: 2025-05-13
Payer: COMMERCIAL

## 2025-05-13 VITALS
HEIGHT: 65 IN | HEART RATE: 70 BPM | BODY MASS INDEX: 26.99 KG/M2 | DIASTOLIC BLOOD PRESSURE: 70 MMHG | WEIGHT: 162 LBS | OXYGEN SATURATION: 98 % | SYSTOLIC BLOOD PRESSURE: 118 MMHG

## 2025-05-13 DIAGNOSIS — N52.9 ERECTILE DYSFUNCTION, UNSPECIFIED ERECTILE DYSFUNCTION TYPE: ICD-10-CM

## 2025-05-13 DIAGNOSIS — R97.20 ELEVATED PSA: Primary | ICD-10-CM

## 2025-05-13 PROCEDURE — 99214 OFFICE O/P EST MOD 30 MIN: CPT | Performed by: UROLOGY

## 2025-05-13 RX ORDER — TADALAFIL 5 MG/1
5 TABLET ORAL DAILY
Qty: 90 TABLET | Refills: 3 | Status: SHIPPED | OUTPATIENT
Start: 2025-05-13 | End: 2026-05-08

## 2025-05-13 NOTE — PROGRESS NOTES
Mary Russo is a(n) 65 y.o. male. , :  1960    Subjective     Assessment:  There were no encounter diagnoses.  65-year-old gentleman here to follow-up on his prostate biopsy performed 4/3/2025.  Fortunately no evidence of cancer.  He still has an elevated PSA which needs to have an explanation.  The prostate was actually enlarged.  It was recorded at 46 g on our ultrasound machine which I think underestimates by a good amount.  Probably more likely this prostate was 60 or 65 g.  Could account for his high PSA.     Plan: My general recommendation for people with a negative biopsy and an elevated PSA is 2 keep a close eye on it over the next year or 2 with every 6-month PSA and digital rectal exam.  To see Endocrinology about TRT as can replace since no cancer.  Cialis refill.     Radiology  none     Past Medical History  Lyme disease  Hyperlipidemia     Past  History  Hypogonadism  Erectile dysfunction uses cialis     Past  surgical history   none     Prior Visits  24 [facility] urology  Initial visit. Hx of low T on T cypionate 100 mg IM weekly. SONAL 15. Also takes Sildenafil 50 mg PRN for ED with good response. Initial T level was in the 400s but free T was low. Repeat T level 2023 in the 800s. Reports overall symptomatic improvement on TRT. Also potentially interested in fertility in the future with his significant other who is in he 40s. PSA 3.12 (2023)    7/10/24 [facility] urology  He stopped TRT 6 weeks ago. Lost job and insurance.    ASSESSMENT & PLAN: Management options discussed. He is interested in fertility and wants to trial Clomid. Will repeat labs in 2 weeks to establish baseline levels. Will then start Clomid 50 mg every other day. He understands that his semen parameters are likely abnormal given his age and use of TRT since 2023. Continue Cialis 20 mg daily PRN for ED.      3/4/2025 OV Nathan Tovar  64-year-old gentleman with symptoms of hypogonadism.  Previously  "seen at [facility] urology.  Appears May 2024 he was on testosterone cypionate 100 mg IM weekly.  Also had ED.  Was interested in fertility because his significant other was in her 40s.  Stopped replacement in July 2024 and started Clomid every other day. Lyme's specialist put him on TRT 2023. Stopped due to insurance. Wants to start again.  His most recent testosterone was obtained by primary care.  His total testosterone was 263 which is below normal.  His free testosterone was 4.6 which is also low.  He is again symptomatic.  I did explain that with an elevated PSA he probably should undergo either a prostate biopsy or a prostate MRI.  He favors a biopsy rather than delaying to get an MRI which may or may not suggest cancer.  He is aware that only a biopsy is definitive for cancer.  The MRI is only suggestive.  He is aware that we do not replace testosterone in our clinic.  We generally refer people to endocrinology.     Plan: Refer to endocrine for TRT but wait until biopsy negative.  Schedule TRUS PNB.    5/13/2025 OV Nathan Tovar  65-year-old gentleman here to follow-up on his prostate biopsy performed 4/3/2025.  Fortunately no evidence of cancer.  He still has an elevated PSA which needs to have an explanation.  The prostate was actually enlarged.  It was recorded at 46 g on our ultrasound machine which I think underestimates by a good amount.  Probably more likely this prostate was 60 or 65 g.  Could account for his high PSA.     Plan: My general recommendation for people with a negative biopsy and an elevated PSA is 2 keep a close eye on it over the next year or 2 with every 6-month PSA and digital rectal exam.  To see Endocrinology about TRT as can replace since no cancer. Cialis refill.    Review of Systems    Lab Results   Component Value Date    PSA 6.9 (H) 01/30/2025     No results found for: \"TESTOSTERONE\"  No components found for: \"CR\"  No results found for: \"HBA1C\"    Objective     /70 " "(BP Location: Left arm, Patient Position: Sitting, Cuff Size: Adult)   Pulse 70   Ht 5' 5\" (1.651 m)   Wt 73.5 kg (162 lb)   SpO2 98%   BMI 26.96 kg/m²     Physical Exam      Nathan Guy, [facility]  "

## 2025-05-14 DIAGNOSIS — M75.122 NONTRAUMATIC COMPLETE TEAR OF LEFT ROTATOR CUFF: Primary | ICD-10-CM

## 2025-05-14 DIAGNOSIS — Z98.890 S/P LEFT ROTATOR CUFF REPAIR: ICD-10-CM

## 2025-05-14 RX ORDER — ACETAMINOPHEN 500 MG
1000 TABLET ORAL EVERY 8 HOURS
Qty: 60 TABLET | Refills: 0 | Status: SHIPPED | OUTPATIENT
Start: 2025-05-14

## 2025-05-14 RX ORDER — NAPROXEN 500 MG/1
500 TABLET ORAL 2 TIMES DAILY WITH MEALS
Qty: 20 TABLET | Refills: 0 | Status: SHIPPED | OUTPATIENT
Start: 2025-05-14

## 2025-05-14 RX ORDER — ERGOCALCIFEROL 1.25 MG/1
50000 CAPSULE ORAL WEEKLY
Qty: 8 CAPSULE | Refills: 0 | Status: SHIPPED | OUTPATIENT
Start: 2025-05-14 | End: 2025-05-16

## 2025-05-14 RX ORDER — OXYCODONE HYDROCHLORIDE 5 MG/1
5 TABLET ORAL EVERY 6 HOURS PRN
Qty: 10 TABLET | Refills: 0 | Status: SHIPPED | OUTPATIENT
Start: 2025-05-14

## 2025-05-14 NOTE — DISCHARGE INSTR - AVS FIRST PAGE
POSTOPERATIVE INSTRUCTIONS following SHOULDER SURGERY    MEDICATIONS:  Resume all home medications unless otherwise instructed by your surgeon.  Pain Medication:   Take Tylenol and Naproxen on prescribed schedule for 7 days  Take Oxycodone as needed for severe pain   Take Vitamin D 50,000 IU capsule once weekly for 8 weeks following surgery.  If you were given a regional anesthetic (nerve block), it is helpful to take your pain medication before the block wear off.    Possible side effects include nausea, constipation, and urinary retention.  If you experience these side effects, please call our office for assistance.  Pain med refills are authorized only during office hours (8am-4pm, Mon-Fri).    WOUND CARE:  Keep the dressing clean and dry.  Light drainage may occur the first 2 days postop.  You may remove the dressings and get the incision wet in the shower 72 hours after surgery.  DO NOT remove steri-strips or sutures.  DO NOT immerse the incision under water.  Carefully pat the incision dry.  If there is wound drainage, re-apply a fresh dry gauze dressing.  Please call our office ([phone]) if you experience either of the following:  Sudden increase in swelling, redness, or warmth at the surgical site  Excessive incisional drainage that persists beyond the 3rd day after surgery  Oral temperature greater than 101 degrees, not relieved with Tylenol  Shortness of breath, chest pain, nausea, or any other concerning symptoms    ICE:  You may use Ice to help with pain control   Place ice on the operative site for 20 minutes at a time when you are in pain     SLING:  Wear your sling AT ALL TIMES (including sleep) until your first postoperative office visit.  You may remove the sling for showering but must keep your arm at your side.    ACTIVITY:   DO NOT lift, carry, push, or pull anything with your operative arm.  Shoulder:  DO NOT actively (on your own) raise your operative arm away from the side of you body or  rotate it out away from your body unless instructed by your surgeon or physical therapist.  Place a pillow behind the elbow while lying down.  Sleeping in a more upright position (recliner) may be more comfortable initially.  Wrist and Finger Motion:  You may take your elbow out of the sling carefully to move your wrist and fingers. Follow your motion precautions for the shoulder. Replace the sling immediately after.     PHYSICAL THERAPY:  Begin therapy 5 TO 7 DAYS AFTER SURGERY.  You were given a prescription for therapy at your preoperative office visit or on the day of surgery.  If you do not have physical therapy scheduled yet, please call our office for assistance.    FOLLOW-UP APPOINTMENT:  10-14 days following surgery with:      Dr. Malcolm Jin MD    [facility]  [address], [address]  [place] [address]    [facility] [address] [address]    Phone:   [phone]

## 2025-05-15 ENCOUNTER — ANESTHESIA EVENT (OUTPATIENT)
Dept: PERIOP | Facility: HOSPITAL | Age: 65
End: 2025-05-15
Payer: COMMERCIAL

## 2025-05-16 ENCOUNTER — ANESTHESIA (OUTPATIENT)
Dept: PERIOP | Facility: HOSPITAL | Age: 65
End: 2025-05-16
Payer: COMMERCIAL

## 2025-05-16 ENCOUNTER — HOSPITAL ENCOUNTER (OUTPATIENT)
Facility: HOSPITAL | Age: 65
Setting detail: OUTPATIENT SURGERY
Discharge: HOME | End: 2025-05-16
Attending: ORTHOPAEDIC SURGERY | Admitting: ORTHOPAEDIC SURGERY
Payer: COMMERCIAL

## 2025-05-16 VITALS
SYSTOLIC BLOOD PRESSURE: 122 MMHG | TEMPERATURE: 97.3 F | HEART RATE: 72 BPM | RESPIRATION RATE: 18 BRPM | OXYGEN SATURATION: 95 % | DIASTOLIC BLOOD PRESSURE: 74 MMHG

## 2025-05-16 PROBLEM — S46.012A TRAUMATIC INCOMPLETE TEAR OF LEFT ROTATOR CUFF: Status: ACTIVE | Noted: 2025-05-16

## 2025-05-16 PROCEDURE — 29827 SHO ARTHRS SRG RT8TR CUF RPR: CPT | Performed by: PHYSICIAN ASSISTANT

## 2025-05-16 PROCEDURE — NC001 PR NO CHARGE: Performed by: ORTHOPAEDIC SURGERY

## 2025-05-16 PROCEDURE — C1713 ANCHOR/SCREW BN/BN,TIS/BN: HCPCS | Performed by: ORTHOPAEDIC SURGERY

## 2025-05-16 PROCEDURE — 29823 SHO ARTHRS SRG XTNSV DBRDMT: CPT | Performed by: PHYSICIAN ASSISTANT

## 2025-05-16 PROCEDURE — 29823 SHO ARTHRS SRG XTNSV DBRDMT: CPT | Performed by: ORTHOPAEDIC SURGERY

## 2025-05-16 PROCEDURE — 29827 SHO ARTHRS SRG RT8TR CUF RPR: CPT | Performed by: ORTHOPAEDIC SURGERY

## 2025-05-16 DEVICE — SP FBRTAK RC FBRTPE BLU & STTPE WH/BLK
Type: IMPLANTABLE DEVICE | Site: SHOULDER | Status: FUNCTIONAL
Brand: ARTHREX®

## 2025-05-16 DEVICE — 4.75MM BC KNOTLESS SWIVELOCK
Type: IMPLANTABLE DEVICE | Site: SHOULDER | Status: FUNCTIONAL
Brand: ARTHREX®

## 2025-05-16 DEVICE — SP FBRTAK RC FBRTPE BLK/BLU & STTPE BLU
Type: IMPLANTABLE DEVICE | Site: SHOULDER | Status: FUNCTIONAL
Brand: ARTHREX®

## 2025-05-16 RX ORDER — CEFAZOLIN SODIUM 2 G/50ML
2000 SOLUTION INTRAVENOUS ONCE
Status: COMPLETED | OUTPATIENT
Start: 2025-05-16 | End: 2025-05-16

## 2025-05-16 RX ORDER — MEPERIDINE HYDROCHLORIDE 25 MG/ML
12.5 INJECTION INTRAMUSCULAR; INTRAVENOUS; SUBCUTANEOUS
Status: DISCONTINUED | OUTPATIENT
Start: 2025-05-16 | End: 2025-05-16 | Stop reason: HOSPADM

## 2025-05-16 RX ORDER — ONDANSETRON 2 MG/ML
INJECTION INTRAMUSCULAR; INTRAVENOUS AS NEEDED
Status: DISCONTINUED | OUTPATIENT
Start: 2025-05-16 | End: 2025-05-16

## 2025-05-16 RX ORDER — MAGNESIUM HYDROXIDE 1200 MG/15ML
LIQUID ORAL AS NEEDED
Status: DISCONTINUED | OUTPATIENT
Start: 2025-05-16 | End: 2025-05-16 | Stop reason: HOSPADM

## 2025-05-16 RX ORDER — CHLORHEXIDINE GLUCONATE ORAL RINSE 1.2 MG/ML
15 SOLUTION DENTAL ONCE
Status: DISCONTINUED | OUTPATIENT
Start: 2025-05-16 | End: 2025-05-16 | Stop reason: HOSPADM

## 2025-05-16 RX ORDER — BUPIVACAINE HYDROCHLORIDE 5 MG/ML
INJECTION, SOLUTION EPIDURAL; INTRACAUDAL; PERINEURAL
Status: COMPLETED | OUTPATIENT
Start: 2025-05-16 | End: 2025-05-16

## 2025-05-16 RX ORDER — HYDROMORPHONE HCL IN WATER/PF 6 MG/30 ML
0.2 PATIENT CONTROLLED ANALGESIA SYRINGE INTRAVENOUS
Status: DISCONTINUED | OUTPATIENT
Start: 2025-05-16 | End: 2025-05-16 | Stop reason: HOSPADM

## 2025-05-16 RX ORDER — METOCLOPRAMIDE HYDROCHLORIDE 5 MG/ML
5 INJECTION INTRAMUSCULAR; INTRAVENOUS
Status: DISCONTINUED | OUTPATIENT
Start: 2025-05-16 | End: 2025-05-16 | Stop reason: HOSPADM

## 2025-05-16 RX ORDER — EPHEDRINE SULFATE 50 MG/ML
INJECTION INTRAVENOUS AS NEEDED
Status: DISCONTINUED | OUTPATIENT
Start: 2025-05-16 | End: 2025-05-16

## 2025-05-16 RX ORDER — DEXAMETHASONE SODIUM PHOSPHATE 10 MG/ML
INJECTION, SOLUTION INTRAMUSCULAR; INTRAVENOUS AS NEEDED
Status: DISCONTINUED | OUTPATIENT
Start: 2025-05-16 | End: 2025-05-16

## 2025-05-16 RX ORDER — SODIUM CHLORIDE, SODIUM LACTATE, POTASSIUM CHLORIDE, CALCIUM CHLORIDE 600; 310; 30; 20 MG/100ML; MG/100ML; MG/100ML; MG/100ML
INJECTION, SOLUTION INTRAVENOUS CONTINUOUS PRN
Status: DISCONTINUED | OUTPATIENT
Start: 2025-05-16 | End: 2025-05-16

## 2025-05-16 RX ORDER — FENTANYL CITRATE/PF 50 MCG/ML
50 SYRINGE (ML) INJECTION
Status: DISCONTINUED | OUTPATIENT
Start: 2025-05-16 | End: 2025-05-16 | Stop reason: HOSPADM

## 2025-05-16 RX ORDER — FENTANYL CITRATE 50 UG/ML
INJECTION, SOLUTION INTRAMUSCULAR; INTRAVENOUS AS NEEDED
Status: DISCONTINUED | OUTPATIENT
Start: 2025-05-16 | End: 2025-05-16

## 2025-05-16 RX ORDER — LABETALOL HYDROCHLORIDE 5 MG/ML
INJECTION, SOLUTION INTRAVENOUS AS NEEDED
Status: DISCONTINUED | OUTPATIENT
Start: 2025-05-16 | End: 2025-05-16

## 2025-05-16 RX ORDER — SODIUM CHLORIDE, SODIUM LACTATE, POTASSIUM CHLORIDE, CALCIUM CHLORIDE 600; 310; 30; 20 MG/100ML; MG/100ML; MG/100ML; MG/100ML
20 INJECTION, SOLUTION INTRAVENOUS CONTINUOUS
Status: DISCONTINUED | OUTPATIENT
Start: 2025-05-16 | End: 2025-05-16 | Stop reason: HOSPADM

## 2025-05-16 RX ORDER — PROPOFOL 10 MG/ML
INJECTION, EMULSION INTRAVENOUS AS NEEDED
Status: DISCONTINUED | OUTPATIENT
Start: 2025-05-16 | End: 2025-05-16

## 2025-05-16 RX ORDER — SODIUM CHLORIDE, SODIUM LACTATE, POTASSIUM CHLORIDE, CALCIUM CHLORIDE 600; 310; 30; 20 MG/100ML; MG/100ML; MG/100ML; MG/100ML
125 INJECTION, SOLUTION INTRAVENOUS CONTINUOUS
Status: DISCONTINUED | OUTPATIENT
Start: 2025-05-16 | End: 2025-05-16 | Stop reason: HOSPADM

## 2025-05-16 RX ORDER — PROMETHAZINE HYDROCHLORIDE 25 MG/ML
12.5 INJECTION, SOLUTION INTRAMUSCULAR; INTRAVENOUS
Status: DISCONTINUED | OUTPATIENT
Start: 2025-05-16 | End: 2025-05-16 | Stop reason: HOSPADM

## 2025-05-16 RX ORDER — MIDAZOLAM HYDROCHLORIDE 2 MG/2ML
INJECTION, SOLUTION INTRAMUSCULAR; INTRAVENOUS
Status: COMPLETED | OUTPATIENT
Start: 2025-05-16 | End: 2025-05-16

## 2025-05-16 RX ORDER — SUCCINYLCHOLINE/SOD CL,ISO/PF 100 MG/5ML
SYRINGE (ML) INTRAVENOUS AS NEEDED
Status: DISCONTINUED | OUTPATIENT
Start: 2025-05-16 | End: 2025-05-16

## 2025-05-16 RX ORDER — LIDOCAINE HYDROCHLORIDE 10 MG/ML
INJECTION, SOLUTION EPIDURAL; INFILTRATION; INTRACAUDAL; PERINEURAL AS NEEDED
Status: DISCONTINUED | OUTPATIENT
Start: 2025-05-16 | End: 2025-05-16

## 2025-05-16 RX ADMIN — BUPIVACAINE 10 ML: 13.3 INJECTION, SUSPENSION, LIPOSOMAL INFILTRATION at 07:24

## 2025-05-16 RX ADMIN — EPHEDRINE SULFATE 5 MG: 50 INJECTION, SOLUTION INTRAVENOUS at 08:24

## 2025-05-16 RX ADMIN — BUPIVACAINE HYDROCHLORIDE 10 ML: 5 INJECTION, SOLUTION EPIDURAL; INTRACAUDAL; PERINEURAL at 07:24

## 2025-05-16 RX ADMIN — PROPOFOL 30 MG: 10 INJECTION, EMULSION INTRAVENOUS at 08:34

## 2025-05-16 RX ADMIN — PROPOFOL 20 MG: 10 INJECTION, EMULSION INTRAVENOUS at 08:40

## 2025-05-16 RX ADMIN — FENTANYL CITRATE 50 MCG: 50 INJECTION, SOLUTION INTRAMUSCULAR; INTRAVENOUS at 07:46

## 2025-05-16 RX ADMIN — FENTANYL CITRATE 50 MCG: 50 INJECTION, SOLUTION INTRAMUSCULAR; INTRAVENOUS at 08:08

## 2025-05-16 RX ADMIN — CEFAZOLIN SODIUM 2000 MG: 2 SOLUTION INTRAVENOUS at 07:44

## 2025-05-16 RX ADMIN — PROPOFOL 20 MG: 10 INJECTION, EMULSION INTRAVENOUS at 09:09

## 2025-05-16 RX ADMIN — LIDOCAINE HYDROCHLORIDE 50 MG: 10 INJECTION, SOLUTION EPIDURAL; INFILTRATION; INTRACAUDAL; PERINEURAL at 07:46

## 2025-05-16 RX ADMIN — LABETALOL 20 MG/4 ML (5 MG/ML) INTRAVENOUS SYRINGE 5 MG: at 08:19

## 2025-05-16 RX ADMIN — MIDAZOLAM 2 MG: 1 INJECTION INTRAMUSCULAR; INTRAVENOUS at 07:20

## 2025-05-16 RX ADMIN — PROPOFOL 150 MG: 10 INJECTION, EMULSION INTRAVENOUS at 07:46

## 2025-05-16 RX ADMIN — ONDANSETRON 4 MG: 2 INJECTION INTRAMUSCULAR; INTRAVENOUS at 07:46

## 2025-05-16 RX ADMIN — Medication 100 MG: at 07:46

## 2025-05-16 RX ADMIN — EPHEDRINE SULFATE 5 MG: 50 INJECTION, SOLUTION INTRAVENOUS at 08:26

## 2025-05-16 RX ADMIN — SODIUM CHLORIDE, SODIUM LACTATE, POTASSIUM CHLORIDE, AND CALCIUM CHLORIDE: .6; .31; .03; .02 INJECTION, SOLUTION INTRAVENOUS at 07:29

## 2025-05-16 RX ADMIN — DEXAMETHASONE SODIUM PHOSPHATE 10 MG: 10 INJECTION, SOLUTION INTRAMUSCULAR; INTRAVENOUS at 07:46

## 2025-05-16 NOTE — ANESTHESIA POSTPROCEDURE EVALUATION
Post-Op Assessment Note    CV Status:  Stable  Pain Score: 0    Pain management: adequate       Mental Status:  Alert and awake   Hydration Status:  Euvolemic   PONV Controlled:  Controlled   Airway Patency:  Patent     Post Op Vitals Reviewed: Yes    No anethesia notable event occurred.    Staff: Anesthesiologist           Last Filed PACU Vitals:  Vitals Value Taken Time   Temp 97.3 °F (36.3 °C) 05/16/25 09:31   Pulse 72 05/16/25 10:05   /70 05/16/25 10:05   Resp 20 05/16/25 10:05   SpO2 95 % 05/16/25 10:05       Modified Nas:     Vitals Value Taken Time   Activity 2 05/16/25 09:31   Respiration 2 05/16/25 09:31   Circulation 2 05/16/25 09:31   Consciousness 2 05/16/25 09:31   Oxygen Saturation 2 05/16/25 09:31     Modified Nas Score: 10

## 2025-05-16 NOTE — ANESTHESIA POSTPROCEDURE EVALUATION
Post-Op Assessment Note    CV Status:  Stable  Pain Score: 0    Pain management: adequate    Multimodal analgesia used between 6 hours prior to anesthesia start to PACU discharge    Mental Status:  Awake and alert   Hydration Status:  Stable and euvolemic   PONV Controlled:  None   Airway Patency:  Patent  Two or more mitigation strategies used for obstructive sleep apnea   Post Op Vitals Reviewed: Yes    No anethesia notable event occurred.    Staff: CRNA           Last Filed PACU Vitals:  Vitals Value Taken Time   Temp 97.3    Pulse 70    /73    Resp 18    SpO2 97

## 2025-05-16 NOTE — OP NOTE
OPERATIVE REPORT  PATIENT NAME: Mary Russo    :  1960  MRN: [id number]  Pt Location: [facility] OR ROOM 03    SURGERY DATE: 2025    Surgeons and Role:     * Lukasz Jin MD - Primary     * Khushboo Rich PA-C - Assisting    The presence of Khushboo Rich PA-C was required for poisitioning, retraction, assistance, and closure. No qualified resident was available.      Preop Diagnosis:  Nontraumatic complete tear of left rotator cuff [M75.122]    Post-Op Diagnosis Codes:     * Nontraumatic complete tear of left rotator cuff [M75.122]    Procedure(s):  Left - Arthroscopic rotator cuff repair, extensive debridement    Specimen(s):  * No specimens in log *    Estimated Blood Loss:   Minimal    Drains:  * No LDAs found *    Anesthesia Type:   General w/ Interscalene Block    Implants:   Implant Name Type Inv. Item Serial No.  Lot No. LRB No. Used Action   ANCHOR SUT 2.6 FIBERTAK  RC 1.7MMTGRTPE BLUE 1.3MM STTPE WH/BLK SP - [id number]  ANCHOR SUT 2.6 FIBERTAK  RC 1.7MMTGRTPE BLUE 1.3MM STTPE WH/BLK SP  [employer] [id number] Left 1 Implanted   ANCHOR SUT 2.6 FIBERTAK  RC 1.7MMTGRTPE BLK/BL 1.3MM STTPE BL/BL SP - [id number]  ANCHOR SUT 2.6 FIBERTAK  RC 1.7MMTGRTPE BLK/BL 1.3MM STTPE BL/BL SP  [employer] [id number] Left 1 Implanted   ANCHOR SUT 4.75 X 19.1MM KNOTTLESS  SWIVELOCK STRL - [id number]  ANCHOR SUT 4.75 X 19.1MM KNOTTLESS  SWIVELOCK STRL  [employer] [id number] Left 1 Implanted   ANCHOR SUT 4.75 X 19.1MM KNOTTLESS  SWIVELOCK STRL - [id number]  ANCHOR SUT 4.75 X 19.1MM KNOTTLESS  SWIVELOCK STRL  [employer] [id number] Left 1 Implanted         Operative Indications:  65 y.o. male with a full thickness tear of the left rotator cuff that affected their quality of life including ability to perform ADLs, work duties, and recreational activities. The patients symptoms and function did not significantly improve with nonoperative treatment options including physical therapy.  Risks,  benefits, and alternatives to surgery were discussed. The patient expressed understanding and elected to proceed with surgery    The patient was greeted in the preoperative holding area, where history, physical exam, review of surgical plan, and operative site oscar were performed. The patient was transferred to the operative suite, placed supine on the operative table.  General endotracheal anesthesia was induced, and perioperative intravenous antibiotics were administered.  The patient was safely repositioned to the beach-chair position with cervical spine in neutral position. All bony prominences were well padded. SCDs were placed on bilateral lower extremities.      Exam under anesthesia was then performed which demonstrated 160 FF, 85 ER in abduction, and 60 IR in abduction. Stability exam showed no instability with load and shift.      The operative shoulder was prepped and draped in usual sterile fashion. A time out was performed with the participation of the entire operative and anesthesia team. Standard posterior glenohumeral and anterior rotator interval portals were established, through which the scope and probe were inserted respectively, which aided and assisted in performance of a complete and thorough diagnostic arthroscopy, which demonstrated the following findings:    Operative Findings:  Humeral head: Normal cartilage  Glenoid: Grade 1 and 2 degenerative changes  Biceps tendon and biceps anchor: Degenerative fraying of the superior labrum with no significant stability.  Biceps tendon with some tendinosis but was stable in the groove  Rotator Cuff: Full-thickness tear of the posterior supraspinatus.  Other tendons intact  Glenoid Labrum showed degenerative fraying with no focal tearing  Subacromial space showed moderate subacromial bursitis and evidence of impingement    I then performed an extensive glenohumeral debridement of  frayed labral tissue,  hypertrophic synovium,  edge of the torn rotator  cuff, and small area of fraying of the biceps.  There was 1 unstable frayed portion of the bicep that only involved several fibers.  This did engage and entered the groove as the bicep and arm was brought to range of motion.  The rest of the tendon appeared very healthy.  I did debride this portion of unstable fraying which I believe was likely driving most of his symptoms in the bicep.  We did not perform a biceps tenodesis.  The subscapularis was normal to probing and rotational movement.  All debris was removed from the glenohumeral joint.      I then placed the arthroscope into the subacromial space. After localization with a spinal needle, I made a lateral incision to establish a lateral portal.  I performed an extensive bursectomy , removed the soft tissue from the undersurface of the acromion. There no prominent subacromial spur but there was evidence of impingement.  Due to signs of impingement and due to increased healing rates after subacromia decompression I did use an arthroscopic bur for the lateral portal to perform a limited subacromial decompression.  Bony debris was removed from the subacromial space.     After bursectomy and SAD was completed evaluation of the rotator cuff again showed full thickness tear of the supraspinatus posteriorly.  A posterolateral viewing portal was created and the scope was moved to the portal.  The tendon was evaluated and had excellent  mobility and the tissue quality was excellent.  Tissue was easily reducible to the footprint on the greater tuberosity.  Using a perry I created a bleeding bed at the greater tuberosity.      I placed a cannula through the lateral incision and utilized this portal to placed anchors in the greater tuberosity.  2 Medial Row all suture arthrex  anchors were placed immediately lateral to the articular margin on the greater tuberosity foot print. Sutures were shuttled out the anterior portal.  Using a scorpion suture passer the sutures from  the anterior anchor were passed through the anterior portion of the supraspinatus just lateral to the musculotendinous  junction.  Then the posterior sutures were passed through the posterior margin of the tear just lateral to the musculotendinous junction.  Traction on the sutures showed good tendon mobility and confirmed ability to reduce the tendon to the greater tuberosity foot print.     In order to complete a knotless double row repair, the lateral aspect of the tuberosity was debrided of soft tissue using the radiofrequency ablator. Through the lateral cannula the punch was placed and tapped into position.  One Suture  from each medial row anchor was passed through a swivel lock anchor. The anchor was then placed through the cannulae and into the previously punched hole.  Once anchor was in the hole the sutures were tightened.  With the help of a mallet the anchor was tapped down to the bone.   Sutures were again tensioned and the anchor was screwed into place.  Next the suture cutter was used to cut the excess suture.   This process was repeated for a second lateral row anchor roughly 2 cm posterior to the first.  The rotator cuff repair was then evaluated with a probe and the scope and felt to have excellent reduction onto the greater tuberosity.  I did utilize the knotless repair stitch from both the anterior and posterior swivel lock anchors to pass through the artery repair tendon and further compress/reinforced the anterior and posterior aspects of the repair. The repair was stable during humeral motion and with evaluation with the probe.      The shoulder was copiously irrigated and drained. The arthroscopic wounds were closed with 3-0 Nylon sutures. A sterile dressing and shoulder immobilizer were placed on the operative extremity.  The patient emerged from anesthesia and was taken to the recovery room in stable condition.  There were no apparent complications.        SIGNATURE: Lukasz Durant  MD Davin  DATE: May 16, 2025  TIME: 10:10 AM

## 2025-05-16 NOTE — ASSESSMENT & PLAN NOTE
We discussed plan again for surgery today. The risks include but are not limited to infection, bleeding, stiffness, loss of range of motion, blood clot, failure of surgery, fracture, risk of potential future arthritis, swelling, injury to surrounding structures/nerve/artery/vein, failure of medical implants or surgical instruments, retained hardware and/or foreign body, and continued pain/dysfunction/disability. We discussed the expected timeline for recovery including the timeline for return to work and sporting activities. The patient expressed good understanding and elected to proceed.  Written consent was signed.         No associated orders from this encounter found during lookback period of 72 hours.

## 2025-05-16 NOTE — PERIOPERATIVE NURSING NOTE
Patient received from PACU in 0/10 pain. Dressings were clean, dry, and intact. Neurovascular assessment WDL on the operative extremity. VSS. Will continue to monitor til discharge criteria is met.   Patient transferred to room - via stretcher, patient alert and awake. Call bell placed in reach, side rails up, bed alarm on, bed in lowest position

## 2025-05-16 NOTE — ANESTHESIA PREPROCEDURE EVALUATION
Procedure:  REPAIR ROTATOR CUFF  ARTHROSCOPIC, biceps tenodesis (Left: Shoulder)    Relevant Problems   CARDIO   (+) Mixed hyperlipidemia        Physical Exam    Airway     Mallampati score: II  TM Distance: >3 FB  Neck ROM: full  Upper bite lip test: I  Mouth opening: >= 4 cm      Cardiovascular  Rhythm: regular, Rate: normal    Dental   No notable dental hx     Pulmonary      Neurological    He appears awake, alert and oriented x3.      Other Findings  post-pubertal.      Anesthesia Plan  ASA Score- 1     Anesthesia Type- general with ASA Monitors.         Additional Monitors:     Airway Plan:            Plan Factors-Exercise tolerance (METS): >4 METS.    Chart reviewed. EKG reviewed.   Patient summary reviewed.    Patient is not a current smoker.  Patient did not smoke on day of surgery.            Induction-     Postoperative Plan- .   Monitoring Plan - Monitoring plan - standard ASA monitoring  Post Operative Pain Plan - non-opiod analgesics        Informed Consent- Anesthetic plan and risks discussed with patient.  I personally reviewed this patient with the CRNA. Discussed and agreed on the Anesthesia Plan with the CRNA..      NPO Status:  Vitals Value Taken Time   Date of last liquid 05/15/25 05/16/25 06:23   Time of last liquid 2000 05/16/25 06:23   Date of last solid 05/15/25 05/16/25 06:23   Time of last solid 2000 05/16/25 06:23

## 2025-05-16 NOTE — PERIOPERATIVE NURSING NOTE
Pt d/c to home at this time w/Kristina (GF),  Via w/c  Pt left with all belongings. Iv was D/C intact with dry sterile dressing, surgical dressing clean dry and intact, dressing to posterior shoulder changed by Dr Jin. Encouraged to keep follow up appointments, Verbalized understanding. D/C instructions reviewed and explained with patient and family member by FLOR SOLIS. Verbalized understanding. New Rx explained, Verbalized understanding. All questions answered.

## 2025-05-16 NOTE — H&P
H&P reviewed. After examining the patient I find no changes in the patients condition since the H&P had been written.    Vitals:    25 0635   BP: 129/72   Pulse: 68   Resp: 18   Temp: 98 °F (36.7 °C)   SpO2: 97%         Patient Name: Mary Russo      : 1960       MRN: [id number]   Encounter Provider: No name on file   Encounter Date: 25  Encounter department: [facility] OPERATING ROOM         Assessment & Plan  Traumatic incomplete tear of left rotator cuff  We discussed plan again for surgery today. The risks include but are not limited to infection, bleeding, stiffness, loss of range of motion, blood clot, failure of surgery, fracture, risk of potential future arthritis, swelling, injury to surrounding structures/nerve/artery/vein, failure of medical implants or surgical instruments, retained hardware and/or foreign body, and continued pain/dysfunction/disability. We discussed the expected timeline for recovery including the timeline for return to work and sporting activities. The patient expressed good understanding and elected to proceed.  Written consent was signed.         No associated orders from this encounter found during lookback period of 72 hours.       _____________________________________________________  CHIEF COMPLAINT:  Left shoulder pain        SUBJECTIVE:  Mary Russo is a right hand dominant 65 y.o. male who presents for initial evaluation to our clinic for bilateral shoulder pain, left > right. The patient has been treated by Dr. Wing for both shoulder with formal PT, left subacromial and U/S-guided biceps tendon sheath steroid injections, and OTC medications so far. The biceps injection provided great relief for 6 months, but the patient notes persistent pain. Dr. Wing did order bilateral shoulder MRIs, which he is here to review and discuss surgery for his left shoulder today. The patient is active around the house at baseline, stating he  "constantly works outside. The patient notes he cut down huge loren trees in his yard that were dead; he believes this may have worsened or flared his symptoms. Otherwise, he denies recent injury/trauma or numbness/tingling. Pain is worst with overhead motions and shoulder abduction. He notes some extent of weakness worse in the left than the right.     The patient smokes 5-6 cigarettes daily. He denies cardiopulmonary disease or diabetes.    Shoulder Surgical History:  None    PAST MEDICAL HISTORY:  Past Medical History:   Diagnosis Date    Lyme disease        PAST SURGICAL HISTORY:  Past Surgical History:   Procedure Laterality Date    BACK SURGERY         FAMILY HISTORY:  Family History   Family history unknown: Yes       SOCIAL HISTORY:  Social History     Tobacco Use    Smoking status: Every Day     Types: Cigarettes     Passive exposure: Past    Smokeless tobacco: Never   Vaping Use    Vaping status: Never Used   Substance Use Topics    Alcohol use: Yes    Drug use: Never       MEDICATIONS:    Current Facility-Administered Medications:     bupivacaine liposomal (EXPAREL) 1.3 % injection 10 mL, 10 mL, Infiltration, Once, Sugar Baker MD    ceFAZolin (ANCEF) IVPB (premix in dextrose) 2,000 mg 50 mL, 2,000 mg, Intravenous, Once, Lukasz Jin MD    chlorhexidine (PERIDEX) 0.12 % oral rinse 15 mL, 15 mL, Swish & Spit, Once, Lukasz Jin MD    lactated ringers infusion, 125 mL/hr, Intravenous, Continuous, Sugar Baker MD    lactated ringers infusion, 20 mL/hr, Intravenous, Continuous, Lukasz Jin MD    ALLERGIES:  No Known Allergies    LABS:  HgA1c:   Lab Results   Component Value Date    HGBA1C 5.8 (H) 04/11/2025     BMP:   Lab Results   Component Value Date    K 4.6 04/11/2025    CO2 21 04/11/2025     04/11/2025    BUN 26 04/11/2025    CREATININE 0.87 04/11/2025     CBC: No components found for: \"CBC\"    _____________________________________________________  Review of " systems: ROS is negative other than that noted in the HPI.  Constitutional: Negative for fatigue and fever.   HENT: Negative for sore throat.    Respiratory: Negative for shortness of breath.    Cardiovascular: Negative for chest pain.   Gastrointestinal: Negative for abdominal pain.   Endocrine: Negative for cold intolerance and heat intolerance.   Genitourinary: Negative for flank pain.   Musculoskeletal: Negative for back pain.   Skin: Negative for rash.   Allergic/Immunologic: Negative for immunocompromised state.   Neurological: Negative for dizziness.   Psychiatric/Behavioral: Negative for agitation.     Left Shoulder Exam:     Inspection: No ecchymosis, edema, or deformity. No visualized wounds or skin lesions   Palpation: moderate bicipital groove tenderness. no AC joint TTP  Active Motion:       FF: 90 actively with pain        ER: 45        IR: T12 with pain  Strength: 4+/5 empty can, 5/5 ER,  5/5 IR   Sensory - SILT in the Radial / Ulnar / Median / Axillary nerve distributions  Motor - AIN / PIN / Radial / Ulnar / Median / Axillary motor nerves in tact  Palpable Radial pulse  Cap refill <2secs in all digits    Left: 4+/5 Belly Press with pain; Right 5/5 Belly Press with pain    Right Shoulder Exam:     Inspection: No ecchymosis, edema, or deformity. No visualized wounds or skin lesions   Palpation: moderate bicipital groove tenderness. no AC joint TTP  Active Motion:       FF: 120+ with pain        ER: 40        IR: T12 with pain  Strength: 4+/5 empty can with pain, 5/5 ER,  5/5 IR   Sensory - SILT in the Radial / Ulnar / Median / Axillary nerve distributions  Motor - AIN / PIN / Radial / Ulnar / Median / Axillary motor nerves in tact  Palpable Radial pulse  Cap refill <2secs in all digits    5/5 Belly Press with pain    Physical exam:  General/Constitutional: NAD, well developed, well nourished  HENT: Normocephalic, atraumatic  CV: Intact distal pulses, regular rate  Resp: No respiratory distress or  labored breathing  Abdomen: soft, nondistended   Lymphatic: No lymphadenopathy palpated  Neuro: Alert and Oriented x 3, no focal deficits  Psych: Normal mood, normal affect  Skin: Warm, dry, no rashes, no erythema  _____________________________________________________  STUDIES REVIEWED:  MRI of the left shoulder reviewed and interpreted, which demonstrates IMPRESSION:     In the left shoulder, full-thickness insertional tear in the posterior supraspinatus, spanning 1.2 cm anterior to posterior. No tendon retraction or muscle atrophy (series 6 images 9-12.)     Moderate infraspinatus tendinosis without tear.     Small posterior glenoid labral tear (Series 4 image 17.)        MRI of the right shoulder reviewed and interpreted, which demonstrates IMPRESSION:     In the right shoulder, partial thickness (1/3) bursal surface insertional tear in the posterior supraspinatus, spanning 0.5 cm anterior to posterior. No tendon retraction or muscle atrophy (series 6 image 12 and series 7 image 24.)     SLAP tear of the glenoid labrum (series 6 images 10-14.)     Moderate subcoracoid bursitis (series 7 image 10.)      PROCEDURES PERFORMED:  Procedures     Scribe Attestation      I,:   am acting as a scribe while in the presence of the attending physician.:       I,:   personally performed the services described in this documentation    as scribed in my presence.:

## 2025-05-16 NOTE — ANESTHESIA PROCEDURE NOTES
Peripheral Block    Patient location during procedure: holding area  Start time: 5/16/2025 7:24 AM  Reason for block: at surgeon's request and post-op pain management  Staffing  Performed by: Sugar Baker MD  Authorized by: Sugar Baker MD    Preanesthetic Checklist  Completed: patient identified, IV checked, site marked, risks and benefits discussed, surgical consent, monitors and equipment checked, pre-op evaluation and timeout performed  Peripheral Block  Prep: ChloraPrep  Patient monitoring: frequent blood pressure checks, continuous pulse oximetry and heart rate  Block type: Interscalene  Laterality: left  Injection technique: single-shot  Procedures: ultrasound guided, Ultrasound guidance required for the procedure to increase accuracy and safety of medication placement and decrease risk of complications.  Ultrasound permanent image saved  bupivacaine (PF) (MARCAINE) 0.5 % injection 20 mL - Perineural   10 mL - 5/16/2025 7:24:00 AM  bupivacaine liposomal (EXPAREL) 1.3 % injection 20 mL - Perineural   10 mL - 5/16/2025 7:24:00 AM  midazolam (VERSED) injection 0.5 mg - Intravenous   2 mg - 5/16/2025 7:20:00 AM  Needle  Needle type: Stimuplex   Needle gauge: 22 G  Needle length: 2 in  Needle localization: anatomical landmarks and ultrasound guidance  Needle insertion depth: 4.5 cm  Assessment  Injection assessment: incremental injection, frequent aspiration, injected with ease, negative aspiration, negative for heart rate change, no paresthesia on injection, no symptoms of intraneural/intravenous injection and needle tip visualized at all times  Paresthesia pain: none  Post-procedure:  site cleaned  patient tolerated the procedure well with no immediate complications

## 2025-05-21 ENCOUNTER — TELEPHONE (OUTPATIENT)
Age: 65
End: 2025-05-21

## 2025-05-21 NOTE — TELEPHONE ENCOUNTER
Caller:  Patient    Reason:  Reviewed Discharged Paperwork  // Gave PT Phone number for patient to set up Therapy

## 2025-05-22 ENCOUNTER — EVALUATION (OUTPATIENT)
Dept: PHYSICAL THERAPY | Facility: CLINIC | Age: 65
End: 2025-05-22
Payer: COMMERCIAL

## 2025-05-22 DIAGNOSIS — M75.122 NONTRAUMATIC COMPLETE TEAR OF ROTATOR CUFF, LEFT: Primary | ICD-10-CM

## 2025-05-22 PROCEDURE — 97162 PT EVAL MOD COMPLEX 30 MIN: CPT

## 2025-05-22 PROCEDURE — 97110 THERAPEUTIC EXERCISES: CPT

## 2025-05-22 NOTE — HOME EXERCISE EDUCATION
Program_ID:[id number]   Access Code: [email or web address]  URL: [email or web address]  Date: 05-  Prepared By: Garcia Gibbs    Program Notes      Exercises      - Seated Scapular Retraction - 1 x daily - 7 x weekly - 1-3 sets - 10 reps      - Standing Shoulder Shrugs - 1 x daily - 7 x weekly - 1-3 sets - 10 reps      - Flexion-Extension Shoulder Pendulum with Table Support - 2 x daily - 7 x weekly - 1-3 sets - 10 reps      - Horizontal Shoulder Pendulum with Table Support - 2 x daily - 7 x weekly - 1-3 sets - 10 reps

## 2025-05-22 NOTE — PROGRESS NOTES
PT Evaluation     Today's date: 2025  Patient name: Mray Russo  : 1960  MRN: [id number]  Referring provider: Graciela Bui PA-C  Dx:   Encounter Diagnosis     ICD-10-CM    1. Nontraumatic complete tear of rotator cuff, left  M75.122           Start Time: 1100  Stop Time: 1145  Total time in clinic (min): 45 minutes    Assessment  Impairments: abnormal or restricted ROM, abnormal movement, activity intolerance, impaired physical strength, lacks appropriate home exercise program, pain with function, poor posture  and poor body mechanics  Symptom irritability: high    Assessment details: Pt reports to therapy demonstrating significant yet expected ROM deficits of the L shoulder due to  RTC repair. MMT not yet able to be performed about the L shoulder due to protocol restrictions; active motions begin in phase 3 () of protocol. Wrist ROM of the RUE is WNL although L wrist and elbow extension/flexion is mildly limited currently most likely due to recent donning of sling. MMT of the RUE revealed weakness about the R shoulder specifically w/ flexion, abduction, and most notably ER; ER was also painful when tested. Due to these impairments, patient has difficulty performing ADL's, recreational activities, lifting/carrying. Patient has been educated in post-op contraindications / precautions, weight bearing status, home exercise program, and plan of care. Patient would benefit from skilled physical therapy services to address their aforementioned functional limitations and progress towards prior level of function and independence with home exercise program.           Goals  Short Term Goals to be accomplished in 4 weeks:  STG1: Pt will be I with HEP to maximize progress between therapy sessions  STG2: Pt will be I with posture management to prevent impingement  STG3: Pt will demo 10-15 deg increase in  PROM abduction/flexion  STG4: Pt will report pain <4/10 in L shoulder when waking up in the  "morning     Long Term Goals to be accomplished in 12 weeks:   LTG1: Pt will move on to phase 3 of his protocol w/o set backs beginning w/active motion at this time   LTG2: Pt will return to work/household ADLs pain free as per PLOF including sorting mail, driving.  LTG3: Pt will demo shoulder strength <4-/5 w/less than 3<10 pain to encourage return to yardwork      Plan  Patient would benefit from: PT eval and skilled physical therapy  Planned modality interventions: cryotherapy and thermotherapy: hydrocollator packs    Planned therapy interventions: home exercise program, therapeutic exercise, therapeutic activities, self care, patient education, manual therapy and neuromuscular re-education    Frequency: 2x week  Treatment plan discussed with: patient  Plan details: HEP development, stretching, strengthening, A/AA/PROM, joint mobilizations, posture education, STM/MI as needed to reduce muscle tension, muscle reeducation, PLOC discussed and agreed upon with patient.          Subjective Evaluation    History of Present Illness  Date of surgery: 5/16/2025  Mechanism of injury: surgery  Mechanism of injury: Pt reports to therapy s/p repair of nontraumatic complete tear of left rotator cuff taking place on 5/16/25. Pt previously attended PT for b/l shoulder pain prior to receiving this L rotator cuff repair. Pt rates his peak pain at this time as \"4 or 5/10\" typically when moving the LUE by mistake. Pt is currently donning L sling w/ABD pillow. This is to be worn at all times except for when at PT and performing elbow/wrist ROM at this time. He denies paresthesias about the L shoulder, elbow,forearm or hand currently and notes that his pain has been relatively well controlled to this point. Pt looks to return to PLOF including yardwork and tree cutting pain free.           Recurrent probem    Quality of life: good    Patient Goals  Patient goals for therapy: increased strength, independence with ADLs/IADLs, improved " balance and decreased pain    Pain  Current pain ratin  At best pain ratin  At worst pain ratin  Quality: dull ache, sharp and tight  Relieving factors: medications  Aggravating factors: overhead activity and lifting    Social Support  Steps to enter house: yes  Stairs in house: yes   Lives in: multiple-level home  Lives with: adult children and spouse    Employment status: not working  Hand dominance: right          Objective    Objective:     AROM: Thoracic Spine (25)    Extension Mod loss  Right Rotation Min loss      Left Rotation Min loss         AROM: Standing  R (25) L (25)  Shoulder flexion  90   NT-Protocol  Shoulder abduction  105   NT-Protocol  Shoulder ER Functional C3   NT-Protocol  Shoulder IR Functional L1   NT-Protocol    Shoulder ER@90  45   NT-Protocol  Shoulder IR   65   NT-Protocol     Wrist Flexion   75   70  Wrist Extension  65   60    Elbow Flexion   140   140  Elbow Extension  0   -5    AROM: Supine  R (25) L (25)  Shoulder flexion  120   NT-Protocol  Shoulder abduction  125   NT-Protocol  Shoulder ER@90  55   NT-Protocol  Shoulder IR   70   NT-Protocol    PROM: Supine  R (25) L (25)  Shoulder flexion  130   70*  Shoulder abduction  130   45*  Shoulder ER@90  55   0  Shoulder IR   70   0    STRENGTH:    R (25) L (25)    Shoulder flexion  4/5   NT-Protocol/5  Shoulder abduction  4/5   NT-Protocol/5  Shoulder ER@90  4-/5*   NT-Protocol/5  Shoulder IR   4/5   NT-Protocol/5  Upper Traps   5/5   NT-Protocol/5  Mid/Lower Traps  4/5   NT-Protocol/5    Wrist Flexion   5/5   4/5  Wrist Extension  5/5   4/5    Elbow Flexion   5/5   NT-Protocol/5  Elbow Extension  5/5   NT-Protocol/5      Posture: Pt's sitting posture is mildly kyphotic about the T/s w/rounding of the R shoulder at this time w/o significant forward head posture.    Cervical Screen: Cervical AROM limitations  Flexion  Min-Mod loss (25)  Extension Min-Mod loss  (05/22/25)  R rotation Min-Mod loss (05/22/25)  L rotation Min-Mod loss (05/22/25)  R sidebend Min-Mod loss (05/22/25)  L sidebend Min-Mod loss (05/22/25)  Retraction Min-Mod loss (05/22/25)         Precautions: Past Medical History[1]        DOS 5/17/25  SOC: 5/22/25  FOTO: 5/22/25  POC Expiration: 8/14/25  Last RE:  Daily Treatment Log:  Date 5/22/25       Visit # 1; IE       AUTH        AUTH EXP        Manual                        There Exer 20'       Objective Measures SJ       Scap retractions 20x       Scap elevations 20x       Pendulums-Horizontal and Vertical 20x each                               HEP Created/Given       There Activ                                                        NMReed                                                Modalities                                HEP:   Access Code: [email or web address]  URL: [email or web address]  Date: 05/22/2025  Prepared by: Garcia Gibbs    Exercises  - Seated Scapular Retraction  - 1 x daily - 7 x weekly - 1-3 sets - 10 reps  - Standing Shoulder Shrugs  - 1 x daily - 7 x weekly - 1-3 sets - 10 reps  - Flexion-Extension Shoulder Pendulum with Table Support  - 2 x daily - 7 x weekly - 1-3 sets - 10 reps  - Horizontal Shoulder Pendulum with Table Support  - 2 x daily - 7 x weekly - 1-3 sets - 10 reps           [1]   Past Medical History:  Diagnosis Date    Lyme disease

## 2025-05-23 ENCOUNTER — OFFICE VISIT (OUTPATIENT)
Dept: PHYSICAL THERAPY | Facility: CLINIC | Age: 65
End: 2025-05-23
Payer: COMMERCIAL

## 2025-05-23 DIAGNOSIS — M75.122 NONTRAUMATIC COMPLETE TEAR OF ROTATOR CUFF, LEFT: Primary | ICD-10-CM

## 2025-05-23 PROCEDURE — 97110 THERAPEUTIC EXERCISES: CPT

## 2025-05-23 PROCEDURE — 97140 MANUAL THERAPY 1/> REGIONS: CPT

## 2025-05-23 NOTE — PROGRESS NOTES
Daily Note     Today's date: 2025  Patient name: Mary Russo  : 1960  MRN: [id number]  Referring provider: Graciela Bui PA-C  Dx:   Encounter Diagnosis     ICD-10-CM    1. Nontraumatic complete tear of rotator cuff, left  M75.122                      Subjective: Pt reports to therapy citing current pain rated as 4/10 w/o presence of Advil/Tylenol or other pain relieving drugs.       Objective: See treatment diary below      Assessment: Tolerated treatment well. Pt was able tolerate elbow and wrist ROM well w/o discomfort. He also tolerated PROM ER/Flexion/IR much better than upon IE. HEP updated to include wrist and elbow ROM. Plan to update as tolerated NV. Patient would benefit from continued PT      Plan: Continue per plan of care.      Precautions: Past Medical History[1]        DOS 25  SOC: 25  FOTO: 25  POC Expiration: 25  Last RE:  Daily Treatment Log:  Date 25      Visit # 1; IE 2      AUTH        AUTH EXP        Manual  10'      Flexion/ER/IR  SJ; 10-20x each w/ ER hold                              There Exer 20' 28'      Objective Measures SJ       Scap retractions 20x 3x10      Scap elevations 20x 3x10      Pendulums-Horizontal and Vertical 20x each 3x10      Wrist flexion  2x10 seated w/ elbow supported on table      Wrist extension  2x10 seated w/ elbow supported on table      Elbow flexion from neutral  From 90-full flexion; 10x                              HEP Created/Given Updated      There Activ                                                        NMReed                                                Modalities                                HEP:   Access Code: [email or web address]  URL: [email or web address]  Date: 2025  Prepared by: Garcia Gibbs    Exercises  - Seated Scapular Retraction  - 1 x daily - 7 x weekly - 1-3 sets - 10 reps  - Standing Shoulder Shrugs  - 1 x daily - 7 x weekly - 1-3 sets - 10 reps  - Flexion-Extension Shoulder  Pendulum with Table Support  - 2 x daily - 7 x weekly - 1-3 sets - 10 reps  - Horizontal Shoulder Pendulum with Table Support  - 2 x daily - 7 x weekly - 1-3 sets - 10 reps                  [1]   Past Medical History:  Diagnosis Date    Lyme disease

## 2025-05-23 NOTE — HOME EXERCISE EDUCATION
Program_ID:[id number]   Access Code: [email or web address]  URL: [email or web address]  Date: 05-  Prepared By: Garcia Gibbs    Program Notes      Exercises      - Seated Scapular Retraction - 1 x daily - 7 x weekly - 1-3 sets - 10 reps      - Standing Shoulder Shrugs - 1 x daily - 7 x weekly - 1-3 sets - 10 reps      - Flexion-Extension Shoulder Pendulum with Table Support - 2 x daily - 7 x weekly - 1-3 sets - 10 reps      - Horizontal Shoulder Pendulum with Table Support - 2 x daily - 7 x weekly - 1-3 sets - 10 reps      - Elbow AROM Flexion/Extension Forearm in Neutral in Supine - 1 x daily - 7 x weekly - 1-2 sets - 10 reps      - Wrist Flexion AROM - 1 x daily - 7 x weekly - 1-2 sets - 10 reps      - Wrist Flexion Extension AROM - Palms Down - 1 x daily - 7 x weekly - 1-2 sets - 10 reps

## 2025-05-27 ENCOUNTER — OFFICE VISIT (OUTPATIENT)
Dept: PHYSICAL THERAPY | Facility: CLINIC | Age: 65
End: 2025-05-27
Payer: COMMERCIAL

## 2025-05-27 DIAGNOSIS — M75.122 NONTRAUMATIC COMPLETE TEAR OF ROTATOR CUFF, LEFT: Primary | ICD-10-CM

## 2025-05-27 PROCEDURE — 97110 THERAPEUTIC EXERCISES: CPT

## 2025-05-27 PROCEDURE — 97140 MANUAL THERAPY 1/> REGIONS: CPT

## 2025-05-27 NOTE — PROGRESS NOTES
Daily Note     Today's date: 2025  Patient name: Mary Russo  : 1960  MRN: [id number]  Referring provider: Graciela Bui PA-C  Dx:   Encounter Diagnosis     ICD-10-CM    1. Nontraumatic complete tear of rotator cuff, left  M75.122                      Subjective: Pt reports to therapy citing low (0/10) ain levels at rest when donning ABD sling. Pt notes he has held a water bottle while in sling w/ L hand, but nothing more.       Objective: See treatment diary below      Assessment: Tolerated treatment well. Pt showed improved pain tolerance to PROM in all planes. He continues to tolerate pendulums, AROM wrist (supported) and elbow flexion (supine) well w/o pain. Plan to update HEP as tolerated. Patient would benefit from continued PT      Plan: Continue per plan of care.      Precautions: Past Medical History[1]        DOS 25  SOC: 25  FOTO: 25  POC Expiration: 25  AUTH EXP: 25  Last RE:  Daily Treatment Log:  Date 25     Visit # 1; IE 2      AUTH  2/ 3/12     AUTH EXP 25     Manual  10'      Flexion/ER/IR  SJ; 10-20x each w/ ER hold SJ; 10-20x each w/ ER hold                             There Exer 20' 28' 20'     Objective Measures SJ       Shoulder rolls   3x10     Scap retractions 20x 3x10 3x10     Scap elevations 20x 3x10 3x10     Pendulums-Horizontal and Vertical 20x each 3x10 3x10 each     Wrist flexion  2x10 seated w/ elbow supported on table 2x10 seated w/ elbow supported on table     Wrist extension  2x10 seated w/ elbow supported on table 2x10 seated w/ elbow supported on table     Elbow flexion from neutral  From 90-full flexion; 10x From 90-full flexion; 10x                                             HEP Created/Given Updated      There Activ                                                        NMReed                                                Modalities                                HEP:   Access Code:  [email or web address]  URL: [email or web address]  Date: 05/22/2025  Prepared by: Garcia Gibbs    Exercises  - Seated Scapular Retraction  - 1 x daily - 7 x weekly - 1-3 sets - 10 reps  - Standing Shoulder Shrugs  - 1 x daily - 7 x weekly - 1-3 sets - 10 reps  - Flexion-Extension Shoulder Pendulum with Table Support  - 2 x daily - 7 x weekly - 1-3 sets - 10 reps  - Horizontal Shoulder Pendulum with Table Support  - 2 x daily - 7 x weekly - 1-3 sets - 10 reps                     [1]   Past Medical History:  Diagnosis Date    Lyme disease

## 2025-05-29 ENCOUNTER — OFFICE VISIT (OUTPATIENT)
Dept: PHYSICAL THERAPY | Facility: CLINIC | Age: 65
End: 2025-05-29
Payer: COMMERCIAL

## 2025-05-29 DIAGNOSIS — M75.122 NONTRAUMATIC COMPLETE TEAR OF ROTATOR CUFF, LEFT: Primary | ICD-10-CM

## 2025-05-29 PROCEDURE — 97140 MANUAL THERAPY 1/> REGIONS: CPT

## 2025-05-29 PROCEDURE — 97110 THERAPEUTIC EXERCISES: CPT

## 2025-05-29 NOTE — HOME EXERCISE EDUCATION
Program_ID:[id number]   Access Code: [email or web address]  URL: [email or web address]  Date: 05-  Prepared By: Garcia Gibbs    Program Notes      Exercises      - Seated Scapular Retraction - 1 x daily - 7 x weekly - 1-3 sets - 10 reps      - Standing Shoulder Shrugs - 1 x daily - 7 x weekly - 1-3 sets - 10 reps      - Flexion-Extension Shoulder Pendulum with Table Support - 2 x daily - 7 x weekly - 1-3 sets - 10 reps      - Horizontal Shoulder Pendulum with Table Support - 2 x daily - 7 x weekly - 1-3 sets - 10 reps      - Elbow AROM Flexion/Extension Forearm in Neutral in Supine - 1 x daily - 7 x weekly - 1-2 sets - 10 reps      - Wrist Flexion AROM - 1 x daily - 7 x weekly - 1-2 sets - 10 reps      - Wrist Flexion Extension AROM - Palms Down - 1 x daily - 7 x weekly - 1-2 sets - 10 reps      - Standing Backward Shoulder Rolls - 1 x daily - 7 x weekly - 1-2 sets - 10 reps      - Standing Upper Trapezius Stretch - 1 x daily - 7 x weekly - 1-2 sets - 10 reps - 5s hold      - Seated Trunk Rotation - Arms Crossed - 1 x daily - 7 x weekly - 1-2 sets - 10 reps

## 2025-05-29 NOTE — PROGRESS NOTES
Daily Note     Today's date: 2025  Patient name: Mary Russo  : 1960  MRN: [id number]  Referring provider: Graciela Bui PA-C  Dx:   Encounter Diagnosis     ICD-10-CM    1. Nontraumatic complete tear of rotator cuff, left  M75.122           Start Time: 1015  Stop Time: 1053  Total time in clinic (min): 38 minutes    Subjective: Pt reports to therapy citing broken sleep due to pain. He adds that his pain was rated as 6/10 at this time and located about the anterior L shoulder. He rates current pain as 3/10.      Objective: See treatment diary below      Assessment: Tolerated treatment well. Pt performed all TE w/o pain even though he had a painful night of sleep. PROM ER, ABD and Flexion in supine showed objective improvements today. Pt found pain relief from pendulums prior to PROM. Trap stretching and T/s ROT added to HEP. Patient would benefit from continued PT      Plan: Continue per plan of care.      Precautions: Past Medical History[1]        DOS 25  SOC: 25  FOTO: 25  POC Expiration: 25  AUTH EXP: 25  Last RE:  Daily Treatment Log:  Date 25 FOTO   Visit # 1; IE 2      AUTH / 3/12 4/12    AUTH EXP 25    Manual  10'  10'    Flexion/ER/IR  SJ; 10-20x each w/ ER hold SJ; 10-20x each w/ ER hold SJ; 10-20x each w/ ER hold                            There Exer 20' 28' 20' 28'    Objective Measures SJ       Shoulder rolls   3x10 3x10; 5 fwd 5 back    Scap retractions 20x 3x10 3x10 3x10    Scap elevations 20x 3x10 3x10 3x10    Pendulums-Horizontal and Vertical 20x each 3x10 3x10 each 3x10    Wrist flexion  2x10 seated w/ elbow supported on table 2x10 seated w/ elbow supported on table 2x15 seated w/ elbow supported on table    Wrist extension  2x10 seated w/ elbow supported on table 2x10 seated w/ elbow supported on table 2x15 seated w/ elbow supported on table    Elbow flexion from neutral  From 90-full flexion;  10x From 90-full flexion; 10x Full ROM w/ TR support    Trap stretching    B/L 10x5s                                    HEP Created/Given Updated      There Activ                                                        NMReed                                                Modalities                                HEP:   Access Code: [email or web address]  URL: [email or web address]  Date: 05/29/2025  Prepared by: Garcia Gibbs    Exercises  - Seated Scapular Retraction  - 1 x daily - 7 x weekly - 1-3 sets - 10 reps  - Standing Shoulder Shrugs  - 1 x daily - 7 x weekly - 1-3 sets - 10 reps  - Flexion-Extension Shoulder Pendulum with Table Support  - 2 x daily - 7 x weekly - 1-3 sets - 10 reps  - Horizontal Shoulder Pendulum with Table Support  - 2 x daily - 7 x weekly - 1-3 sets - 10 reps  - Elbow AROM Flexion/Extension Forearm in Neutral in Supine  - 1 x daily - 7 x weekly - 1-2 sets - 10 reps  - Wrist Flexion AROM  - 1 x daily - 7 x weekly - 1-2 sets - 10 reps  - Wrist Flexion Extension AROM - Palms Down  - 1 x daily - 7 x weekly - 1-2 sets - 10 reps  - Standing Backward Shoulder Rolls  - 1 x daily - 7 x weekly - 1-2 sets - 10 reps  - Standing Upper Trapezius Stretch  - 1 x daily - 7 x weekly - 1-2 sets - 10 reps - 5s hold  - Seated Trunk Rotation - Arms Crossed  - 1 x daily - 7 x weekly - 1-2 sets - 10 reps                        [1]   Past Medical History:  Diagnosis Date    Lyme disease

## 2025-05-29 NOTE — HOME EXERCISE EDUCATION
Program_ID:[id number]   Access Code: [email or web address]  URL: [email or web address]  Date: 05-  Prepared By: Garcia Gibbs    Program Notes      Exercises      - Seated Scapular Retraction - 1 x daily - 7 x weekly - 1-3 sets - 10 reps      - Standing Shoulder Shrugs - 1 x daily - 7 x weekly - 1-3 sets - 10 reps      - Flexion-Extension Shoulder Pendulum with Table Support - 2 x daily - 7 x weekly - 1-3 sets - 10 reps      - Horizontal Shoulder Pendulum with Table Support - 2 x daily - 7 x weekly - 1-3 sets - 10 reps      - Elbow AROM Flexion/Extension Forearm in Neutral in Supine - 1 x daily - 7 x weekly - 1-2 sets - 10 reps      - Wrist Flexion AROM - 1 x daily - 7 x weekly - 1-2 sets - 10 reps      - Wrist Flexion Extension AROM - Palms Down - 1 x daily - 7 x weekly - 1-2 sets - 10 reps      - Standing Backward Shoulder Rolls - 1 x daily - 7 x weekly - 1-2 sets - 10 reps      - Standing Upper Trapezius Stretch - 1 x daily - 7 x weekly - 1-2 sets - 10 reps - 5s hold

## 2025-06-02 ENCOUNTER — OFFICE VISIT (OUTPATIENT)
Dept: OBGYN CLINIC | Facility: CLINIC | Age: 65
End: 2025-06-02

## 2025-06-02 ENCOUNTER — OFFICE VISIT (OUTPATIENT)
Dept: PHYSICAL THERAPY | Facility: CLINIC | Age: 65
End: 2025-06-02
Payer: COMMERCIAL

## 2025-06-02 VITALS — BODY MASS INDEX: 26.99 KG/M2 | WEIGHT: 162 LBS | HEIGHT: 65 IN

## 2025-06-02 DIAGNOSIS — M75.122 NONTRAUMATIC COMPLETE TEAR OF ROTATOR CUFF, LEFT: Primary | ICD-10-CM

## 2025-06-02 DIAGNOSIS — Z98.890 S/P LEFT ROTATOR CUFF REPAIR: Primary | ICD-10-CM

## 2025-06-02 DIAGNOSIS — M75.122 NONTRAUMATIC COMPLETE TEAR OF LEFT ROTATOR CUFF: ICD-10-CM

## 2025-06-02 DIAGNOSIS — Z48.89 AFTERCARE FOLLOWING SURGERY: ICD-10-CM

## 2025-06-02 PROCEDURE — 99024 POSTOP FOLLOW-UP VISIT: CPT | Performed by: ORTHOPAEDIC SURGERY

## 2025-06-02 PROCEDURE — 97140 MANUAL THERAPY 1/> REGIONS: CPT

## 2025-06-02 PROCEDURE — 97110 THERAPEUTIC EXERCISES: CPT

## 2025-06-02 NOTE — ASSESSMENT & PLAN NOTE
Mary is a pleasant 65-year-old female who presents today for follow-up evaluation now 2 weeks status post repair rotator cuff arthroscopic left performed on 5/16/2025.  Upon examination I am pleased with his progress, his sutures were removed in office today with no complications.  There were sites of suture irritation however I am not concerned for infection.  Did explain to the patient if he notices redness, swelling, drainage to call the office to receive antibiotics.  I did discuss with him he can stop using naproxen however he can continue utilize Tylenol as needed for pain control.  He will remain in the sling for the next 4 weeks.  I did have a discussion explaining that the rehab process is lengthy and will require a multitude of months to have him return to his previous level of function.  He will follow-up in 4 weeks for reevaluation.  All of his question concerns were addressed today.

## 2025-06-02 NOTE — PROGRESS NOTES
Daily Note     Today's date: 2025  Patient name: Mary Russo  : 1960  MRN: [id number]  Referring provider: Graciela Bui PA-C  Dx:   Encounter Diagnosis     ICD-10-CM    1. Nontraumatic complete tear of rotator cuff, left  M75.122                      Subjective: pt reports that he cont to really struggle w/ sleeping due to increased pain at night when he is lying down. He has tried to use pillows to prop his arm up and has tried to sleep in the recliner, all w/o success. He reports to session w/ no pain; he tends to do best when he is standing and in his sling. He saw the MD today and he feels things are healing appropriately at this time, he would like him to remain in the sling for another 4 weeks.       Objective: See treatment diary below      Assessment: Tolerated treatment well. Pt dem good PROM for this point post op, mostly restricted by tightness rather than pain. Limited most in ER at this time. Tolerated addition of PROM table slides well w/o complaints, updated to HEP. Patient would benefit from continued PT      Plan: Continue per plan of care.      Precautions: Past Medical History[1]  Wound Care: Keep the dressing clean and dry.  Light drainage may occur the first 2 days postop.   You may remove the dressings and get the incision wet in the shower 48 hours after surgery.  DO NOT remove steri-strips or sutures.  DO NOT immerse the incision under water.  Carefully pat the incision dry.  If there is wound drainage, re-apply a fresh dry gauze dressing.     Sling: wear the sling at all times, including sleep, except for hygiene for 6 weeks following surgery. Keep the arm by the side during hygiene. The patient may remove the sling, keeping the arm by the side to perform gentle wrist and elbow range of motion. The sling may be removed for PT sessions as described below.     If ARTHROSCOPIC/OPEN SUBPECTORAL BICEPS TENODESIS was performed, the patient can perform passive and active assisted ROM  as tolerated. Avoid active and resisted elbow flexion and supination until 6 weeks post-op.     If SUBSCAPULARIS REPAIR was performed, avoid external rotation beyond 30 degrees until 6 weeks post op.     PHASE I (Weeks 0-4): passive range of motion        · Begin passive range of motion within 7 days following surgery. Completed 3 times per week        · Begin scapula exercises with the arm in the sling within 7 days following surgery, including scapular elevation, depression, retraction, and protraction. Completed daily Weeks 0-6        · Begin pendulum exercises at home. Completed 2 times per day     PHASE II (Weeks 4-8): active assisted range of motion        · Week 4: lying active assisted motion             o Using a stick or cane while lying flat, the nonsurgical arm moves the injured arm through different motions, including forward flexion, external rotation, and abduction. Completed daily        · Weeks 5: 45-degree active assisted motion             o Using a stick or cane while lying propped at 45 degrees, the nonsurgical arm moves the injured arm through different motions, including forward flexion, external rotation, and abduction. Completed daily        · Weeks 6-8: upright active assisted motion             o Using a stick or cane while sitting up or standing, the nonsurgical arm moves the injured arm through different motions, including forward flexion, external rotation, and abduction. Completed daily        · Week 6: scapula exercises without the sling, including scapular elevation, depression, retraction, and protraction. Completed daily     PHASE III (Weeks 8-12): active motion        · AROM: while sitting up or standing, actively forward flex and abduct the shoulders. Completed daily             o Important not to hike the shoulder up towards the ear.        · Isometric exercises: push and pull a folded towel or pillow into a wall. Completed daily, holding push/pull for 15s with 30s rest in  "between for 10-15 reps             o Completed with elbow flexed at 90 degrees and with shoulder internal/external rotation     PHASE IV (Weeks 12-16): resisted exercise        · Begin rotator cuff strengthening using weights and/or elastic bands. Completed 3 times per week for 3-4 sets of 10-15 reps             o Resisted shoulder internal/external rotation             o Resisted deltoid strengthening             o Resisted scapula strengthening             o AVOID doing full can or empty can exercises        · Begin light shoulder stretching             o Hold each stretch for 15s, then rest 15s. Repeat 5 times             o After Week 16, may use aggressive stretching if needed       DOS 5/16/25  SOC: 5/22/25  FOTO: 5/22/25  POC Expiration: 8/14/25  AUTH EXP: 8/20/25  Last RE:  Daily Treatment Log:  Date 5/22/25 5/23/25 5/27/25 5/29/25 6/2/2025  FOTO    Visit # 1; IE 2   5   AUTH 1/12 2/12/ 3/12 4/12 5/12   AUTH EXP 8/20/25 8/20/25 8/20/25 8/20/25 8/20/2025   Manual  10'  10' 10'    Flexion/ER/IR  SJ; 10-20x each w/ ER hold SJ; 10-20x each w/ ER hold SJ; 10-20x each w/ ER hold RB all planes                            There Exer 20' 28' 20' 28' 30'    Objective Measures SJ       PROM table slides flex      5\"x10; 2x    Shoulder rolls   3x10 3x10; 5 fwd 5 back Bwd shoulder rolls 20x    Scap retractions 20x 3x10 3x10 3x10 5\"x10; 2x    Scap elevations 20x 3x10 3x10 3x10    Pendulums-Horizontal and Vertical 20x each 3x10 3x10 each 3x10 20x ea    Wrist flexion  2x10 seated w/ elbow supported on table 2x10 seated w/ elbow supported on table 2x15 seated w/ elbow supported on table Supported on table 1# DB 2x10    Wrist extension  2x10 seated w/ elbow supported on table 2x10 seated w/ elbow supported on table 2x15 seated w/ elbow supported on table Supported on table 1# DB 2x10     Elbow flexion from neutral  From 90-full flexion; 10x From 90-full flexion; 10x Full ROM w/ TR support Full ROM 1x10; 2x    Std elbow in 90 " "flex Sup/pronation      1x10; 2x    Trap stretching    B/L 10x5s 10\"x5 R/L cerv SB                                    HEP Created/Given Updated   Updated & issued    There Activ                                                        NMReed                                                Modalities                                HEP:   Access Code: [email or web address]  URL: [email or web address]  Date: 06/02/2025  Prepared by: Jayleen Almodovar    Exercises  - Seated Scapular Retraction  - 1 x daily - 7 x weekly - 1-3 sets - 10 reps  - Standing Shoulder Shrugs  - 1 x daily - 7 x weekly - 1-3 sets - 10 reps  - Flexion-Extension Shoulder Pendulum with Table Support  - 2 x daily - 7 x weekly - 1-3 sets - 10 reps  - Horizontal Shoulder Pendulum with Table Support  - 2 x daily - 7 x weekly - 1-3 sets - 10 reps  - Elbow AROM Flexion/Extension Forearm in Neutral in Supine  - 1 x daily - 7 x weekly - 1-2 sets - 10 reps  - Wrist Flexion AROM  - 1 x daily - 7 x weekly - 1-2 sets - 10 reps  - Wrist Flexion Extension AROM - Palms Down  - 1 x daily - 7 x weekly - 1-2 sets - 10 reps  - Standing Backward Shoulder Rolls  - 1 x daily - 7 x weekly - 1-2 sets - 10 reps  - Standing Upper Trapezius Stretch  - 1 x daily - 7 x weekly - 1-2 sets - 10 reps - 5s hold  - Seated Trunk Rotation - Arms Crossed  - 1 x daily - 7 x weekly - 1-2 sets - 10 reps  - Seated Shoulder Flexion Towel Slide at Table Top  - 1 x daily - 7 x weekly - 2 sets - 10 reps - 5sec hold                           [1]   Past Medical History:  Diagnosis Date    Lyme disease      "

## 2025-06-02 NOTE — PROGRESS NOTES
Patient Name: Mary Russo      : 1960       MRN: [id number]   Encounter Provider: Lukasz Jin MD   Encounter Date: 25  Encounter department: [facility]         Assessment & Plan  S/P left rotator cuff repair  Nontraumatic complete tear of left rotator cuff  Mary is a pleasant 65-year-old female who presents today for follow-up evaluation now 2 weeks status post repair rotator cuff arthroscopic left performed on 2025.  Upon examination I am pleased with his progress, his sutures were removed in office today with no complications.  There were sites of suture irritation however I am not concerned for infection.  Did explain to the patient if he notices redness, swelling, drainage to call the office to receive antibiotics.  I did discuss with him he can stop using naproxen however he can continue utilize Tylenol as needed for pain control.  He will remain in the sling for the next 4 weeks.  I did have a discussion explaining that the rehab process is lengthy and will require a multitude of months to have him return to his previous level of function.  He will follow-up in 4 weeks for reevaluation.  All of his question concerns were addressed today.               _____________________________________________________  CHIEF COMPLAINT:  Chief Complaint   Patient presents with    Left Shoulder - Post-op         SUBJECTIVE:  Patient is a 65 y.o. year old male who presents for follow up now 2 weeks s/p Repair Rotator Cuff  Arthroscopic - Left performed on 2025.  Today patient has no pain on exam today.  Patient states he will occasionally have pain while sleeping and he will utilize oxycodone before bed.  He has stopped taking naproxen and acetaminophen due to upsetting his stomach.  He states he is progressing well in physical therapy and has no pain in physical therapy.  He denies any fever chills or distal paresthesias.  He denies any drainage or  warmth of his incision sites.       _____________________________________________________  PHYSICAL EXAM:  General/Constitutional: NAD, well developed, well nourished  HENT: Normocephalic, atraumatic  CV: Intact distal pulses, regular rate  Resp: No respiratory distress or labored breathing  Abdomen: soft, nondistended   Lymphatic: No lymphadenopathy palpated  Neuro: Alert and Oriented x 3, no focal deficits  Psych: Normal mood, normal affect  Skin: Warm, dry, no rashes, no erythema    MUSCULOSKELETAL EXAMINATION:    Left Upper Extremity    Incision:  Clean, dry, and intact with suture irritation  Range of Motion: Tolerates gentle IR and ER ROM  IR--20 degrees  Strength: Deferred  +Axillary/AIN/PIN/Ulnar Motor Function   SILT throughout   Palpable Radial pulse        Scribe Attestation      I,:  Leonel Meredith am acting as a scribe while in the presence of the attending physician.:       I,:  Lukasz Jin MD personally performed the services described in this documentation    as scribed in my presence.:

## 2025-06-05 ENCOUNTER — CONSULT (OUTPATIENT)
Dept: RHEUMATOLOGY | Facility: CLINIC | Age: 65
End: 2025-06-05
Payer: COMMERCIAL

## 2025-06-05 VITALS
DIASTOLIC BLOOD PRESSURE: 62 MMHG | BODY MASS INDEX: 27.29 KG/M2 | OXYGEN SATURATION: 97 % | WEIGHT: 163.8 LBS | HEIGHT: 65 IN | SYSTOLIC BLOOD PRESSURE: 122 MMHG | HEART RATE: 67 BPM

## 2025-06-05 DIAGNOSIS — R76.8 ELEVATED RHEUMATOID FACTOR: Primary | ICD-10-CM

## 2025-06-05 DIAGNOSIS — M25.50 DIFFUSE ARTHRALGIA: ICD-10-CM

## 2025-06-05 DIAGNOSIS — Z86.19 HISTORY OF LYME DISEASE: ICD-10-CM

## 2025-06-05 DIAGNOSIS — E55.9 VITAMIN D DEFICIENCY: ICD-10-CM

## 2025-06-05 PROCEDURE — 99204 OFFICE O/P NEW MOD 45 MIN: CPT | Performed by: INTERNAL MEDICINE

## 2025-06-05 NOTE — PROGRESS NOTES
Name: Mary Russo      : 1960      MRN: [id number]  Encounter Provider: Pati Rodriguez MD  Encounter Date: 2025   Encounter department: [facility]  :  Assessment & Plan  Elevated rheumatoid factor  Mr. Russo is a 65-year-old male with history significant for Lyme disease with left-sided Bell's palsy approximately 1 to 2 years ago and bilateral shoulder rotator cuff tendinopathy status post left-sided repair, who presents for an evaluation of joint pains and an elevated rheumatoid factor at a titer of 17.9.  She is referred by LIZA Gary for a rheumatology consult.    - Mary presents today for an evaluation of sharp, momentary pain he experiences in his elbows and knees with certain activities, as well as a sensation of fatigue in his bilateral shoulders, that has been occurring over the past 10 years.  He is not describing more consistent joint pains, joint swelling or prolonged morning stiffness of his joints and there is no synovitis noted on his examination today.  Given the nature of his arthralgias, this would not be consistent with an inflammatory arthritis/Lyme arthritis/rheumatoid arthritis and I suspect the rheumatoid factor is falsely elevated.  To further evaluate his symptoms and the rheumatoid factor I will complete a serological workup and obtain x-rays of the involved joints.  Based on the results I will determine if he needs a rheumatology follow-up.    Orders:    Sjogren's Antibodies; Future    Sedimentation rate, automated; Future    C4 complement; Future    C3 complement; Future    HLA-B27 antigen; Future    Protein electrophoresis, serum; Future    Uric acid; Future    Rheumatoid Arthritis Profile; Future    XR knee 3 vw left non injury; Future    XR knee 3 vw right non injury; Future    XR elbow 3+ vw left; Future    XR elbow 3+ vw right; Future    Diffuse arthralgia    Orders:    Sjogren's Antibodies; Future    Sedimentation rate,  automated; Future    C4 complement; Future    C3 complement; Future    HLA-B27 antigen; Future    Protein electrophoresis, serum; Future    Uric acid; Future    Rheumatoid Arthritis Profile; Future    XR knee 3 vw left non injury; Future    XR knee 3 vw right non injury; Future    XR elbow 3+ vw left; Future    XR elbow 3+ vw right; Future    History of Lyme disease         Vitamin D deficiency    Orders:    Vitamin D 25 hydroxy; Future        History of Present Illness   HPI    Mr. Russo is a 65-year-old male with history significant for Lyme disease with left-sided Bell's palsy approximately 1 to 2 years ago and bilateral shoulder rotator cuff tendinopathy status post left-sided repair, who presents for an evaluation of joint pains and an elevated rheumatoid factor at a titer of 17.9.  She is referred by LIZA Gary for a rheumatology consult.    Patient reports he has experienced joint pains for at least 10 years now, primarily affecting his shoulders, elbows and knees, but states except for slight progression noted with the shoulder discomfort/feeling of weakness, his symptoms have not progressively worsened over time.  They are not present on a daily basis and occur intermittently, being mostly activity related such as when he is bending at his elbows, walking or squatting.  The pain occurs as a sharp momentary pain and he is denying chronic or persistent joint pains.  He denies any complaints affecting his hands, wrists, hips, ankles or feet.  He denies joint swelling.  He does experience morning stiffness affecting his shoulders that starts to improve gradually.  As his pain occurs momentarily, he has not tried any over-the-counter pain medications for his symptoms.  He denies inflammatory eye disease, psoriasis, inflammatory bowel disease or a family history of autoimmune disease.    He mentions approximately 1 to 2 years ago when he was diagnosed with Lyme disease with left-sided Bell's palsy, he  was seen by a rheumatologist who specialized in Lyme disease.  He was diagnosed with acute and chronic Lyme and was treated with antibiotics and possibly antivirals.  He does recall having extensive blood work done, but is not sure what the tests were for and what the results were.  He was not diagnosed with an alternate condition except for Lyme disease.  As he was paying out-of-pocket and his visits were not covered by insurance, he did not continue long-term follow-up.    Testing done by his PCP showed the borderline elevated rheumatoid factor.  An DERIC screen and C-reactive protein were normal.          Review of Systems  Constitutional: Negative for weight change, fevers, chills, night sweats, fatigue.  ENT/Mouth: Negative for hearing changes, ear pain, nasal congestion, sinus pain, hoarseness, sore throat, rhinorrhea, swallowing difficulty.   Eyes: Negative for pain, redness, discharge, vision changes.   Cardiovascular: Negative for chest pain, SOB, palpitations.   Respiratory: Negative for cough, sputum, wheezing, dyspnea.   Gastrointestinal: Negative for nausea, vomiting, diarrhea, constipation, pain, heartburn.  Genitourinary: Negative for dysuria, urinary frequency, hematuria.   Musculoskeletal: As per HPI.  Skin: Negative for skin rash, color changes.   Neuro: Negative for weakness, numbness, tingling, loss of consciousness.   Psych: Negative for anxiety, depression.   Heme/Lymph: Negative for easy bruising, bleeding, lymphadenopathy.      Past Medical History   Past Medical History[1]  Past Surgical History[2]  Family History[3]   reports that he has been smoking cigarettes. He has been exposed to tobacco smoke. He has never used smokeless tobacco. He reports current alcohol use. He reports that he does not use drugs.  Current Outpatient Medications   Medication Instructions    acetaminophen (TYLENOL) 1,000 mg, Oral, Every 8 hours    naproxen (NAPROSYN) 500 mg, Oral, 2 times daily with meals    oxyCODONE  "(ROXICODONE) 5 mg, Oral, Every 6 hours PRN    tadalafil (CIALIS) 5 mg, Oral, Daily   Allergies[4]   Medications Ordered Prior to Encounter[5]   Social History[6]     Objective   /62 (BP Location: Right arm, Patient Position: Sitting, Cuff Size: Adult)   Pulse 67   Ht 5' 5\" (1.651 m)   Wt 74.3 kg (163 lb 12.8 oz)   SpO2 97%   BMI 27.26 kg/m²      Physical Exam  General: Well appearing, well nourished, in no distress. Oriented x 3, normal mood and affect.  Ambulating without difficulty.  Skin: Good turgor, no rash, unusual bruising or prominent lesions.  Exfoliated skin noted on his bilateral knees - states he was kneeling on hard ground yesterday.  Hair: Normal texture and distribution.  Nails: No deformities.  No pitting.  Right foot toenail discoloration noted diffusely.   HEENT:  Head: Normocephalic, atraumatic.  Eyes: Conjunctiva clear, sclera non-icteric, EOM intact.  Nose: No external lesions.  Neck: Supple.  Extremities: No amputations or deformities, cyanosis, edema.  Musculoskeletal:   There is no peripheral joint soft tissue swelling or tenderness noted.  Left arm in a sling.   Neurologic: Alert and oriented. No focal neurological deficits appreciated.   Psychiatric: Normal mood and affect.            [1]   Past Medical History:  Diagnosis Date    Lyme disease    [2]   Past Surgical History:  Procedure Laterality Date    BACK SURGERY      [facility] SURGICAL ARTHROSCOPY SHOULDER W/ROTATOR CUFF RPR Left 5/16/2025    Procedure: REPAIR ROTATOR CUFF  ARTHROSCOPIC;  Surgeon: Lukasz Jin MD;  Location: [facility];  Service: Orthopedics   [3]   Family History  Family history unknown: Yes   [4] No Known Allergies  [5]   Current Outpatient Medications on File Prior to Visit   Medication Sig Dispense Refill    acetaminophen (TYLENOL) 500 mg tablet Take 2 tablets (1,000 mg total) by mouth every 8 (eight) hours 60 tablet 0    naproxen (Naprosyn) 500 mg tablet Take 1 tablet (500 mg total) by mouth 2 " (two) times a day with meals 20 tablet 0    oxyCODONE (Roxicodone) 5 immediate release tablet Take 1 tablet (5 mg total) by mouth every 6 (six) hours as needed for severe pain for up to 10 doses Max Daily Amount: 20 mg 10 tablet 0    tadalafil (CIALIS) 5 MG tablet Take 1 tablet (5 mg total) by mouth daily 90 tablet 3     No current facility-administered medications on file prior to visit.   [6]   Social History  Tobacco Use    Smoking status: Every Day     Types: Cigarettes     Passive exposure: Past    Smokeless tobacco: Never   Vaping Use    Vaping status: Never Used   Substance and Sexual Activity    Alcohol use: Yes    Drug use: Never

## 2025-06-06 ENCOUNTER — APPOINTMENT (OUTPATIENT)
Dept: PHYSICAL THERAPY | Facility: CLINIC | Age: 65
End: 2025-06-06
Payer: COMMERCIAL

## 2025-06-06 LAB
25(OH)D3+25(OH)D2 SERPL-MCNC: 50.2 NG/ML (ref 30–100)
B BURGDOR IGG+IGM SER QL IA: NEGATIVE
ENA SS-A AB SER-ACNC: <0.2 AI (ref 0–0.9)
ENA SS-B AB SER-ACNC: <0.2 AI (ref 0–0.9)
ERYTHROCYTE [SEDIMENTATION RATE] IN BLOOD BY WESTERGREN METHOD: 17 MM/HR (ref 0–30)
URATE SERPL-MCNC: 4.3 MG/DL (ref 3.8–8.4)

## 2025-06-07 LAB
C3 SERPL-MCNC: 133 MG/DL (ref 82–167)
C4 SERPL-MCNC: 33 MG/DL (ref 12–38)
PSA SERPL-MCNC: 4.4 NG/ML (ref 0–4)
RHEUMATOID FACT SERPL-ACNC: <10 IU/ML

## 2025-06-09 ENCOUNTER — RESULTS FOLLOW-UP (OUTPATIENT)
Dept: RHEUMATOLOGY | Facility: CLINIC | Age: 65
End: 2025-06-09

## 2025-06-09 ENCOUNTER — TELEPHONE (OUTPATIENT)
Dept: OBGYN CLINIC | Facility: CLINIC | Age: 65
End: 2025-06-09

## 2025-06-09 LAB
ALBUMIN SERPL ELPH-MCNC: 3.7 G/DL (ref 2.9–4.4)
ALBUMIN/GLOB SERPL: 1.2 {RATIO} (ref 0.7–1.7)
ALPHA1 GLOB SERPL ELPH-MCNC: 0.2 G/DL (ref 0–0.4)
ALPHA2 GLOB SERPL ELPH-MCNC: 0.6 G/DL (ref 0.4–1)
B-GLOBULIN SERPL ELPH-MCNC: 0.9 G/DL (ref 0.7–1.3)
GAMMA GLOB SERPL ELPH-MCNC: 1.4 G/DL (ref 0.4–1.8)
GLOBULIN SER CALC-MCNC: 3.1 G/DL (ref 2.2–3.9)
LABORATORY COMMENT REPORT: NORMAL
M PROTEIN SERPL ELPH-MCNC: NORMAL G/DL
PROT SERPL-MCNC: 6.8 G/DL (ref 6–8.5)

## 2025-06-09 NOTE — TELEPHONE ENCOUNTER
LVM providing a call back number. Advised Pt to call and reschedule due to Dr. Lukasz iJn not being in the office in the morning. Pt can schedule the same day in the afternoon or another day with Dr. Lukasz Jin.  Previous appointment note---PO left shoulder.

## 2025-06-10 ENCOUNTER — OFFICE VISIT (OUTPATIENT)
Dept: PHYSICAL THERAPY | Facility: CLINIC | Age: 65
End: 2025-06-10
Payer: COMMERCIAL

## 2025-06-10 ENCOUNTER — APPOINTMENT (OUTPATIENT)
Dept: RADIOLOGY | Facility: CLINIC | Age: 65
End: 2025-06-10
Payer: COMMERCIAL

## 2025-06-10 DIAGNOSIS — M25.50 DIFFUSE ARTHRALGIA: ICD-10-CM

## 2025-06-10 DIAGNOSIS — R76.8 ELEVATED RHEUMATOID FACTOR: ICD-10-CM

## 2025-06-10 DIAGNOSIS — M75.122 NONTRAUMATIC COMPLETE TEAR OF ROTATOR CUFF, LEFT: Primary | ICD-10-CM

## 2025-06-10 PROCEDURE — 73080 X-RAY EXAM OF ELBOW: CPT

## 2025-06-10 PROCEDURE — 97140 MANUAL THERAPY 1/> REGIONS: CPT

## 2025-06-10 PROCEDURE — 97110 THERAPEUTIC EXERCISES: CPT

## 2025-06-10 PROCEDURE — 73562 X-RAY EXAM OF KNEE 3: CPT

## 2025-06-10 NOTE — TELEPHONE ENCOUNTER
Pt calling back informed him of result note from provider. Pt verbalizes understanding and gives thanks

## 2025-06-10 NOTE — PROGRESS NOTES
Daily Note     Today's date: 6/10/2025  Patient name: Mary Russo  : 1960  MRN: [id number]  Referring provider: Graciela Bui PA-C  Dx:   Encounter Diagnosis     ICD-10-CM    1. Nontraumatic complete tear of rotator cuff, left  M75.122                      Subjective: pt reports his arm was doing really well until this morning, he was getting out of the shower and he dropped the towel and went to reach for it w/ his L UE which gave him a sharp pain, it is not lingering worse on arrival to session. He cont w/ some difficulty sleeping, but every night is not bad       Objective: See treatment diary below      Assessment: Tolerated treatment well. Pt does dem some restrictions into PROM w/ initial reps but does loosen w/ additional stretching of PROM. Overall tolerating TE per MD protocol well w/ mild soreness mostly into table slides when pushing too far. Limited most into ER at this time. Patient would benefit from continued PT      Plan: Continue per plan of care.      Precautions: Past Medical History[1]  Wound Care: Keep the dressing clean and dry.  Light drainage may occur the first 2 days postop.   You may remove the dressings and get the incision wet in the shower 48 hours after surgery.  DO NOT remove steri-strips or sutures.  DO NOT immerse the incision under water.  Carefully pat the incision dry.  If there is wound drainage, re-apply a fresh dry gauze dressing.     Sling: wear the sling at all times, including sleep, except for hygiene for 6 weeks following surgery. Keep the arm by the side during hygiene. The patient may remove the sling, keeping the arm by the side to perform gentle wrist and elbow range of motion. The sling may be removed for PT sessions as described below.     If ARTHROSCOPIC/OPEN SUBPECTORAL BICEPS TENODESIS was performed, the patient can perform passive and active assisted ROM as tolerated. Avoid active and resisted elbow flexion and supination until 6 weeks post-op.     If  SUBSCAPULARIS REPAIR was performed, avoid external rotation beyond 30 degrees until 6 weeks post op.     PHASE I (Weeks 0-4): passive range of motion        · Begin passive range of motion within 7 days following surgery. Completed 3 times per week        · Begin scapula exercises with the arm in the sling within 7 days following surgery, including scapular elevation, depression, retraction, and protraction. Completed daily Weeks 0-6        · Begin pendulum exercises at home. Completed 2 times per day     PHASE II (Weeks 4-8): active assisted range of motion        · Week 4: lying active assisted motion             o Using a stick or cane while lying flat, the nonsurgical arm moves the injured arm through different motions, including forward flexion, external rotation, and abduction. Completed daily        · Weeks 5: 45-degree active assisted motion             o Using a stick or cane while lying propped at 45 degrees, the nonsurgical arm moves the injured arm through different motions, including forward flexion, external rotation, and abduction. Completed daily        · Weeks 6-8: upright active assisted motion             o Using a stick or cane while sitting up or standing, the nonsurgical arm moves the injured arm through different motions, including forward flexion, external rotation, and abduction. Completed daily        · Week 6: scapula exercises without the sling, including scapular elevation, depression, retraction, and protraction. Completed daily     PHASE III (Weeks 8-12): active motion        · AROM: while sitting up or standing, actively forward flex and abduct the shoulders. Completed daily             o Important not to hike the shoulder up towards the ear.        · Isometric exercises: push and pull a folded towel or pillow into a wall. Completed daily, holding push/pull for 15s with 30s rest in between for 10-15 reps             o Completed with elbow flexed at 90 degrees and with shoulder  "internal/external rotation     PHASE IV (Weeks 12-16): resisted exercise        · Begin rotator cuff strengthening using weights and/or elastic bands. Completed 3 times per week for 3-4 sets of 10-15 reps             o Resisted shoulder internal/external rotation             o Resisted deltoid strengthening             o Resisted scapula strengthening             o AVOID doing full can or empty can exercises        · Begin light shoulder stretching             o Hold each stretch for 15s, then rest 15s. Repeat 5 times             o After Week 16, may use aggressive stretching if needed       DOS 5/16/25  SOC: 5/22/25  FOTO: 5/22/25  POC Expiration: 8/14/25  AUTH EXP: 8/20/25  Last RE:  Daily Treatment Log:  Date 6/10/25  5/27/25 5/29/25 6/2/2025  FOTO    Visit # 6    5   AUTH 6/12  3/12 4/12 5/12   AUTH EXP 8/20/25 8/20/25 8/20/25 8/20/2025   Manual 10'    10' 10'    Flexion/ER/IR RB all planes   SJ; 10-20x each w/ ER hold SJ; 10-20x each w/ ER hold RB all planes                            There Exer 28'   20' 28' 30'    Objective Measures        PROM table slides flex  5\"x10; 2x flex/scap    5\"x10; 2x    Supine cane ER in neutral w/ TR under elbow  5\"x10        Shoulder rolls Bwd shoulder rolls 20x   3x10 3x10; 5 fwd 5 back Bwd shoulder rolls 20x    Scap retractions 5\"x10   3x10 3x10 5\"x10; 2x    Scap elevations   3x10 3x10    Pendulums-Horizontal and Vertical 20x each  3x10 each 3x10 20x ea    Wrist flexion Supported on table 1# DB 2x10   2x10 seated w/ elbow supported on table 2x15 seated w/ elbow supported on table Supported on table 1# DB 2x10    Wrist extension Supported on table 1# DB 2x10  2x10 seated w/ elbow supported on table 2x15 seated w/ elbow supported on table Supported on table 1# DB 2x10     Elbow flexion from neutral Full ROM 2x10   From 90-full flexion; 10x Full ROM w/ TR support Full ROM 1x10; 2x    Std elbow in 90 flex Sup/pronation  2x10 ea  Holding therabar for increased stretch    1x10; 2x  " "  Trap stretching 10\"x5 R/L cerv SB    B/L 10x5s 10\"x5 R/L cerv SB    gripper Blk 35# 20x                                HEP     Updated & issued    There Activ                                                        NMReed                                                Modalities                                HEP:   Access Code: [email or web address]  URL: [email or web address]  Date: 06/02/2025  Prepared by: Jayleen Almodovar    Exercises  - Seated Scapular Retraction  - 1 x daily - 7 x weekly - 1-3 sets - 10 reps  - Standing Shoulder Shrugs  - 1 x daily - 7 x weekly - 1-3 sets - 10 reps  - Flexion-Extension Shoulder Pendulum with Table Support  - 2 x daily - 7 x weekly - 1-3 sets - 10 reps  - Horizontal Shoulder Pendulum with Table Support  - 2 x daily - 7 x weekly - 1-3 sets - 10 reps  - Elbow AROM Flexion/Extension Forearm in Neutral in Supine  - 1 x daily - 7 x weekly - 1-2 sets - 10 reps  - Wrist Flexion AROM  - 1 x daily - 7 x weekly - 1-2 sets - 10 reps  - Wrist Flexion Extension AROM - Palms Down  - 1 x daily - 7 x weekly - 1-2 sets - 10 reps  - Standing Backward Shoulder Rolls  - 1 x daily - 7 x weekly - 1-2 sets - 10 reps  - Standing Upper Trapezius Stretch  - 1 x daily - 7 x weekly - 1-2 sets - 10 reps - 5s hold  - Seated Trunk Rotation - Arms Crossed  - 1 x daily - 7 x weekly - 1-2 sets - 10 reps  - Seated Shoulder Flexion Towel Slide at Table Top  - 1 x daily - 7 x weekly - 2 sets - 10 reps - 5sec hold                              [1]   Past Medical History:  Diagnosis Date    Lyme disease      "

## 2025-06-11 ENCOUNTER — CONSULT (OUTPATIENT)
Dept: ENDOCRINOLOGY | Facility: CLINIC | Age: 65
End: 2025-06-11
Payer: COMMERCIAL

## 2025-06-11 VITALS
OXYGEN SATURATION: 97 % | DIASTOLIC BLOOD PRESSURE: 82 MMHG | HEART RATE: 79 BPM | HEIGHT: 65 IN | SYSTOLIC BLOOD PRESSURE: 125 MMHG | WEIGHT: 163.4 LBS | BODY MASS INDEX: 27.22 KG/M2

## 2025-06-11 DIAGNOSIS — R97.20 ELEVATED PSA: ICD-10-CM

## 2025-06-11 DIAGNOSIS — R53.83 OTHER FATIGUE: ICD-10-CM

## 2025-06-11 DIAGNOSIS — R79.89 LOW TESTOSTERONE IN MALE: Primary | ICD-10-CM

## 2025-06-11 PROCEDURE — 99204 OFFICE O/P NEW MOD 45 MIN: CPT | Performed by: INTERNAL MEDICINE

## 2025-06-11 NOTE — PATIENT INSTRUCTIONS
Please have labs done as ordered  -ideally it be 6 weeks from the time that you had surgery or taken any pain medication    Follow up in 3 months

## 2025-06-11 NOTE — PROGRESS NOTES
Mary Russo 65 y.o. male MRN: [id number]    Encounter: [id number]  Assessment & Plan  1. Low testosterone levels  2. Fatigue   3. Elevated PSA    Reviewed labs form 8/2023 prior to starting testosterone therapy -  Total testosterone 414, free testosterone 40.9 (normal range ),  test done in the afternoon 2:30 pm.  Labs were not repeated in the morning, no additional testing done to determine etiology if patient was truly hypogonadal.  He has never had an MRI of the pituitary.  Unremarkable MRI brain in 2014     Reviewed urology notes from 2023, patient did not ever start Clomid.  Reviewed labs from 2/2025 which showed a low total as well as free testosterone level.  Discussed impact of being on exogenous testosterone and discontinuing it, impact on labs.  Also discussed that timeline for HPA axis recovery if it will occur can be variable    - Advised to abstain from pain medication  (patient states that he has not taken since surgery), ideally should wait atleast 6-8 weeks from the time of surgery before having repeat labs.   Will check AM total, free testosterone, SHBG and additional tests for LH, FSH, prolactin, thyroid levels, and CBC ordered.  - discussed potential use of clomid     --Discussed lab findings with patient.  Reviewed radiographic findings with patient including images if available  --Discussed hypogonadism pathophysiology and differential diagnosis with patient.  --Discussed with patient we need to exclude a potential pituitary problem, such as a pituitary adenoma.   --Further reccommendations include, potential testosterone treatment, pending this evaluation.      CC: low testosterone levels    History of Present Illness  The patient is a 65-year-old male here for a consult regarding low testosterone levels.    Diagnosed with  Bell's palsy secondary to Lyme disease 2 years ago in 7/2023.  Soon after the visit, he had followed up with rheumatology, for a complaint of fatigue, had  "testosterone levels checked and was started on testosterone that was then continued by urology.   Recently transferred care to Dr. Tovar who deferred therapy until prostate cancer was ruled out.  Has followed up with urology, is status post prostate biopsy 4/2025 which was within normal limits, referred to our clinic for further management.  Reviewed urology notes and there is no contraindication from their end to start testosterone therapy    Testosterone therapy discontinued since 08/2024 or 09/2024.   Reports fatigue, decreased libido, hair loss on head, but no changes in shaving frequency or hair loss in underarm/pubic regions.   Able to maintain erection with Cialis. No history of head/genital trauma, diabetes, malignancies, radiation, chemotherapy, or HIV. Reports worsening eyesight due to aging, no peripheral vision loss. No nipple tenderness, discharge, or gynecomastia.   Occasional marijuana use.     Shoulder surgery in 05/2025, prescribed pain medication as needed, discontinued naproxen due to gastrointestinal side effects.    SOCIAL HISTORY  Uses marijuana recreationally.        All other systems were reviewed and are negative.       Review of Systems    Historical Information   Past Medical History[1]  Past Surgical History[2]  Social History   Social History     Substance and Sexual Activity   Alcohol Use Yes     Social History     Substance and Sexual Activity   Drug Use Never     Tobacco Use History[3]  Family History: Family History[4]    Meds/Allergies   Current Medications[5]  Allergies[6]    Objective   Vitals: Blood pressure 125/82, pulse 79, height 5' 5\" (1.651 m), weight 74.1 kg (163 lb 6.4 oz), SpO2 97%.    Physical Exam  Constitutional:       General: He is not in acute distress.     Appearance: He is well-developed. He is not diaphoretic.   HENT:      Head: Normocephalic and atraumatic.     Eyes:      Conjunctiva/sclera: Conjunctivae normal.     Neck:      Comments: No thyromegaly " "  Nontender, no nodules appreciated on palpation     Cardiovascular:      Rate and Rhythm: Normal rate and regular rhythm.      Heart sounds: Normal heart sounds. No murmur heard.  Pulmonary:      Effort: Pulmonary effort is normal. No respiratory distress.      Breath sounds: Normal breath sounds. No wheezing.   Abdominal:      General: There is no distension.      Palpations: Abdomen is soft.      Tenderness: There is no abdominal tenderness. There is no guarding.     Musculoskeletal:      Cervical back: Normal range of motion and neck supple.      Comments: Left arm in sling      Skin:     General: Skin is warm and dry.      Findings: No erythema or rash.     Neurological:      Mental Status: He is alert and oriented to person, place, and time.     Psychiatric:         Behavior: Behavior normal.         Thought Content: Thought content normal.         The history was obtained from the review of the chart, patient.    Lab Results:      Lab Results   Component Value Date    WBC 13.6 (H) 04/11/2025    HGB 15.1 04/11/2025    HCT 43.6 04/11/2025    MCV 88 04/11/2025     04/11/2025     Lab Results   Component Value Date    CREATININE 0.87 04/11/2025    BUN 26 04/11/2025    K 4.6 04/11/2025     04/11/2025    CO2 21 04/11/2025     Lab Results   Component Value Date    HGBA1C 5.8 (H) 04/11/2025     Component      Latest Ref Rng 1/30/2025 4/11/2025 6/5/2025   PSA      0.0 - 4.0 ng/mL 6.9 (H)   4.4 (H)    PROSTATE SPECIFIC ANTIGEN FREE      N/A ng/mL 1.08      PROSTATE SPECIFIC ANTIGEN PERCENT FREE      % 15.7      Testosterone, Total, LC/MS      264 - 916 ng/dL 263 (L)      TESTOSTERONE FREE      6.6 - 18.1 pg/mL 4.6 (L)         Legend:  (H) High  (L) Low    Imaging Studies:   Results Review Statement: I reviewed radiology reports from 2014 including MRI brain - normal        Portions of the record may have been created with voice recognition software. Occasional wrong word or \"sound a like\" substitutions may " have occurred due to the inherent limitations of voice recognition software. Read the chart carefully and recognize, using context, where substitutions have occurred.         [1]   Past Medical History:  Diagnosis Date    Lyme disease    [2]   Past Surgical History:  Procedure Laterality Date    BACK SURGERY      TX SURGICAL ARTHROSCOPY SHOULDER W/ROTATOR CUFF RPR Left 5/16/2025    Procedure: REPAIR ROTATOR CUFF  ARTHROSCOPIC;  Surgeon: Lukasz Jin MD;  Location: [facility];  Service: Orthopedics   [3]   Social History  Tobacco Use   Smoking Status Every Day    Types: Cigarettes    Passive exposure: Past   Smokeless Tobacco Never   [4]   Family History  Problem Relation Name Age of Onset    No Known Problems Mother      No Known Problems Sister      Heart Valve Disease Brother oldest     No Known Problems Maternal Aunt      No Known Problems Maternal Uncle      No Known Problems Paternal Aunt      No Known Problems Paternal Uncle      No Known Problems Maternal Grandmother      No Known Problems Maternal Grandfather      No Known Problems Paternal Grandmother      No Known Problems Paternal Grandfather     [5]   Current Outpatient Medications   Medication Sig Dispense Refill    acetaminophen (TYLENOL) 500 mg tablet Take 2 tablets (1,000 mg total) by mouth every 8 (eight) hours 60 tablet 0    naproxen (Naprosyn) 500 mg tablet Take 1 tablet (500 mg total) by mouth 2 (two) times a day with meals 20 tablet 0    oxyCODONE (Roxicodone) 5 immediate release tablet Take 1 tablet (5 mg total) by mouth every 6 (six) hours as needed for severe pain for up to 10 doses Max Daily Amount: 20 mg 10 tablet 0    tadalafil (CIALIS) 5 MG tablet Take 1 tablet (5 mg total) by mouth daily 90 tablet 3     No current facility-administered medications for this visit.   [6] No Known Allergies

## 2025-06-13 ENCOUNTER — OFFICE VISIT (OUTPATIENT)
Dept: PHYSICAL THERAPY | Facility: CLINIC | Age: 65
End: 2025-06-13
Payer: COMMERCIAL

## 2025-06-13 DIAGNOSIS — M75.122 NONTRAUMATIC COMPLETE TEAR OF ROTATOR CUFF, LEFT: Primary | ICD-10-CM

## 2025-06-13 PROCEDURE — 97110 THERAPEUTIC EXERCISES: CPT

## 2025-06-16 ENCOUNTER — OFFICE VISIT (OUTPATIENT)
Dept: PHYSICAL THERAPY | Facility: CLINIC | Age: 65
End: 2025-06-16
Payer: COMMERCIAL

## 2025-06-16 DIAGNOSIS — M75.122 NONTRAUMATIC COMPLETE TEAR OF ROTATOR CUFF, LEFT: Primary | ICD-10-CM

## 2025-06-16 LAB — HLA-B27 QL NAA+PROBE: NEGATIVE

## 2025-06-16 PROCEDURE — 97110 THERAPEUTIC EXERCISES: CPT

## 2025-06-16 PROCEDURE — 97140 MANUAL THERAPY 1/> REGIONS: CPT

## 2025-06-16 NOTE — PROGRESS NOTES
"Daily Note     Today's date: 2025  Patient name: Mary Russo  : 1960  MRN: [id number]  Referring provider: Graciela Bui PA-C  Dx:   Encounter Diagnosis     ICD-10-CM    1. Nontraumatic complete tear of rotator cuff, left  M75.122           Start Time: 1100  Stop Time: 1140  Total time in clinic (min): 40 minutes    Subjective: Pt reports to therapy citing minor pain of the L elbow yesterday (6/15) rated as 4/10. Pt added that he performed elbow AROM w/o sling and decreased his pain significantly.       Objective: See treatment diary below      Assessment: Tolerated treatment well. Pt was able to achieve 105 deg in PROM flexion w/minimal discomfort. Today we began AAROM interventions including table step backs and pt had no pain. Pt educated to monitor/prevent active motions of the L shoulder currently though his pain is declining and it is easier to \"forget\" his sling around the house.  Patient would benefit from continued PT      Plan: Continue per plan of care.      Precautions: Past Medical History[1]  Wound Care: Keep the dressing clean and dry.  Light drainage may occur the first 2 days postop.   You may remove the dressings and get the incision wet in the shower 48 hours after surgery.  DO NOT remove steri-strips or sutures.  DO NOT immerse the incision under water.  Carefully pat the incision dry.  If there is wound drainage, re-apply a fresh dry gauze dressing.     Sling: wear the sling at all times, including sleep, except for hygiene for 6 weeks following surgery. Keep the arm by the side during hygiene. The patient may remove the sling, keeping the arm by the side to perform gentle wrist and elbow range of motion. The sling may be removed for PT sessions as described below.     If ARTHROSCOPIC/OPEN SUBPECTORAL BICEPS TENODESIS was performed, the patient can perform passive and active assisted ROM as tolerated. Avoid active and resisted elbow flexion and supination until 6 weeks post-op. "     If SUBSCAPULARIS REPAIR was performed, avoid external rotation beyond 30 degrees until 6 weeks post op.     PHASE I (Weeks 0-4): passive range of motion        · Begin passive range of motion within 7 days following surgery. Completed 3 times per week        · Begin scapula exercises with the arm in the sling within 7 days following surgery, including scapular elevation, depression, retraction, and protraction. Completed daily Weeks 0-6        · Begin pendulum exercises at home. Completed 2 times per day     PHASE II (Weeks 4-8): active assisted range of motion BEGIN 6/20       · Week 4: lying active assisted motion             o Using a stick or cane while lying flat, the nonsurgical arm moves the injured arm through different motions, including forward flexion, external rotation, and abduction. Completed daily        · Weeks 5: 45-degree active assisted motion             o Using a stick or cane while lying propped at 45 degrees, the nonsurgical arm moves the injured arm through different motions, including forward flexion, external rotation, and abduction. Completed daily        · Weeks 6-8: upright active assisted motion             o Using a stick or cane while sitting up or standing, the nonsurgical arm moves the injured arm through different motions, including forward flexion, external rotation, and abduction. Completed daily        · Week 6: scapula exercises without the sling, including scapular elevation, depression, retraction, and protraction. Completed daily     PHASE III (Weeks 8-12): active motion BEGIN 7/17       · AROM: while sitting up or standing, actively forward flex and abduct the shoulders. Completed daily             o Important not to hike the shoulder up towards the ear.        · Isometric exercises: push and pull a folded towel or pillow into a wall. Completed daily, holding push/pull for 15s with 30s rest in between for 10-15 reps             o Completed with elbow flexed at 90  "degrees and with shoulder internal/external rotation     PHASE IV (Weeks 12-16): resisted exercise BEGIN 8/14       · Begin rotator cuff strengthening using weights and/or elastic bands. Completed 3 times per week for 3-4 sets of 10-15 reps             o Resisted shoulder internal/external rotation             o Resisted deltoid strengthening             o Resisted scapula strengthening             o AVOID doing full can or empty can exercises        · Begin light shoulder stretching             o Hold each stretch for 15s, then rest 15s. Repeat 5 times             o After Week 16, may use aggressive stretching if needed       DOS 5/16/25  SOC: 5/22/25  FOTO: 5/22/25  POC Expiration: 8/14/25  AUTH EXP: 8/20/25  Last RE:  Daily Treatment Log:  Date 6/10/25 6/13/25 6/16/25 5/29/25 6/2/2025  FOTO    Visit # 6 7 8 4 5   AUTH 6/12 7/12 8/12 4/12 5/12   AUTH EXP 8/20/25 8/20/25 8/20/25 8/20/25 8/20/2025   Manual 10'  10' 10' 10' 10'    Flexion/ER/IR RB all planes  SJ all planes SJ all planes SJ; 10-20x each w/ ER hold RB all planes                            There Exer 28'  40' 30' 28' 30'    Objective Measures        PROM table slides flex  5\"x10; 2x flex/scap 5\"x10; 2x flex/scap 5\"x10; 2x scap; trial table slide for flexion today  5\"x10; 2x    Supine cane ER in neutral w/ TR under elbow  5\"x10  5\"x10       Shoulder rolls Bwd shoulder rolls 20x  Bwd shoulder rolls 20x  Bwd shoulder rolls 20x  3x10; 5 fwd 5 back Bwd shoulder rolls 20x    Scap retractions 5\"x10  5\"x10  5\"x10  3x10 5\"x10; 2x    Scap elevations    3x10    Pendulums-Horizontal and Vertical 20x each 20x each 20x each 3x10 20x ea    Wrist flexion Supported on table 1# DB 2x10  Supported on table 1# DB 2x10  3# Db; 2x10  2x15 seated w/ elbow supported on table Supported on table 1# DB 2x10    Wrist extension Supported on table 1# DB 2x10 Supported on table 1# DB 2x10 3# Db; 2x10  2x15 seated w/ elbow supported on table Supported on table 1# DB 2x10     Elbow " "flexion from neutral Full ROM 2x10  Full ROM 2x10 In standing 2x10 Full ROM Full ROM w/ TR support Full ROM 1x10; 2x    Std elbow in 90 flex Sup/pronation  2x10 ea  Holding therabar for increased stretch; 2x10    1x10; 2x    Trap stretching 10\"x5 R/L cerv SB  10\"x5 R/L cerv SB  10\"x5 R/L cerv SB  B/L 10x5s 10\"x5 R/L cerv SB    gripper Blk 35# 20x  Blk 35# 20x  Blk 35# 20x      Table step back; flexion   10x5s; only 3 steps today (measured at 105 deg in standing                              HEP     Updated & issued    There Activ                                                        NMReed                                                Modalities                                HEP:   Access Code: [email or web address]  URL: [email or web address]  Date: 06/02/2025  Prepared by: Jayleen Almodovar    Exercises  - Seated Scapular Retraction  - 1 x daily - 7 x weekly - 1-3 sets - 10 reps  - Standing Shoulder Shrugs  - 1 x daily - 7 x weekly - 1-3 sets - 10 reps  - Flexion-Extension Shoulder Pendulum with Table Support  - 2 x daily - 7 x weekly - 1-3 sets - 10 reps  - Horizontal Shoulder Pendulum with Table Support  - 2 x daily - 7 x weekly - 1-3 sets - 10 reps  - Elbow AROM Flexion/Extension Forearm in Neutral in Supine  - 1 x daily - 7 x weekly - 1-2 sets - 10 reps  - Wrist Flexion AROM  - 1 x daily - 7 x weekly - 1-2 sets - 10 reps  - Wrist Flexion Extension AROM - Palms Down  - 1 x daily - 7 x weekly - 1-2 sets - 10 reps  - Standing Backward Shoulder Rolls  - 1 x daily - 7 x weekly - 1-2 sets - 10 reps  - Standing Upper Trapezius Stretch  - 1 x daily - 7 x weekly - 1-2 sets - 10 reps - 5s hold  - Seated Trunk Rotation - Arms Crossed  - 1 x daily - 7 x weekly - 1-2 sets - 10 reps  - Seated Shoulder Flexion Towel Slide at Table Top  - 1 x daily - 7 x weekly - 2 sets - 10 reps - 5sec hold                                    [1]   Past Medical History:  Diagnosis Date    Lyme disease      "

## 2025-06-19 ENCOUNTER — EVALUATION (OUTPATIENT)
Dept: PHYSICAL THERAPY | Facility: CLINIC | Age: 65
End: 2025-06-19
Payer: COMMERCIAL

## 2025-06-19 DIAGNOSIS — M75.122 NONTRAUMATIC COMPLETE TEAR OF ROTATOR CUFF, LEFT: Primary | ICD-10-CM

## 2025-06-19 PROCEDURE — 97110 THERAPEUTIC EXERCISES: CPT

## 2025-06-19 NOTE — PROGRESS NOTES
"PT Evaluation     Today's date: 2025  Patient name: Mary Russo  : 1960  MRN: [id number]  Referring provider: Graciela Bui PA-C  Dx:   Encounter Diagnosis     ICD-10-CM    1. Nontraumatic complete tear of rotator cuff, left  M75.122             Start Time: 1230  Stop Time: 1315  Total time in clinic (min): 45 minutes    Assessment  Impairments: abnormal or restricted ROM, abnormal movement, activity intolerance, impaired physical strength, lacks appropriate home exercise program, pain with function, poor posture  and poor body mechanics  Symptom irritability: high    Assessment details: 25: RE  Pt reports to his RE approximately 4 weeks removed from his PT IE following L RTC repair originally taking place on 25. Pt presents w/significant gains in PROM from his IE now reaching 121 deg in L shoulder flexion and achieving 103 deg in L shoulder abduction. Pt had end range pain w/all PROM measurements excluding IR, and significant pain w/ end range abduction that reduced following performance of continued PROM and subsequent pendulums. Due to pt protocol no MMTs can be taken at this time, however should be ready per protocol by [place] to test these motions.  Pt has periodic moments where he \"forgets\" that he is not to yet move his arm actively (begins ) which can increase his pain to a maximum level of 6/10. Overall pt is progressing effectively w/no setbacks at this time. Pt continues to feel the majority of his discomfort about the anterior shoulder near insertion of biceps long head. Plan to continue UE progressions as tolerated and w/in protocol moving forward to return pt to PLOF of pain free active motions and ADLs.      Pt reports to therapy demonstrating significant yet expected ROM deficits of the L shoulder due to  RTC repair. MMT not yet able to be performed about the L shoulder due to protocol restrictions; active motions begin in phase 3 () of protocol. Wrist ROM of the RUE " is WNL although L wrist and elbow extension/flexion is mildly limited currently most likely due to recent donning of sling. MMT of the RUE revealed weakness about the R shoulder specifically w/ flexion, abduction, and most notably ER; ER was also painful when tested. Due to these impairments, patient has difficulty performing ADL's, recreational activities, lifting/carrying. Patient has been educated in post-op contraindications / precautions, weight bearing status, home exercise program, and plan of care. Patient would benefit from skilled physical therapy services to address their aforementioned functional limitations and progress towards prior level of function and independence with home exercise program.         Understanding of Dx/Px/POC: good     Prognosis: good    Goals  Short Term Goals to be accomplished in 4 weeks: 6/19/25  STG1: Pt will be I with HEP to maximize progress between therapy sessions -MET 6/19/25  STG2: Pt will be I with posture management to prevent impingement -MET 6/19/25  STG3: Pt will demo 10-15 deg increase in  PROM abduction/flexion -MET 6/19/25  STG4: Pt will report pain <4/10 in L shoulder when waking up in the morning-Ongoing 6/19/25     Long Term Goals to be accomplished in 12 weeks: 8/14/25  LTG1: Pt will move on to phase 3 of his protocol w/o set backs beginning w/active motion at this time   LTG2: Pt will return to work/household ADLs pain free as per PLOF including sorting mail, driving.  LTG3: Pt will demo shoulder strength <4-/5 w/less than 3<10 pain to encourage return to yardwork      Plan  Patient would benefit from: PT eval and skilled physical therapy  Planned modality interventions: cryotherapy and thermotherapy: hydrocollator packs    Planned therapy interventions: home exercise program, therapeutic exercise, therapeutic activities, self care, patient education, manual therapy and neuromuscular re-education    Frequency: 2x week  Duration in weeks: 12  Plan of Care  "beginning date: 5/22/2025  Plan of Care expiration date: 8/14/2025  Treatment plan discussed with: patient  Plan details: HEP development, stretching, strengthening, A/AA/PROM, joint mobilizations, posture education, STM/MI as needed to reduce muscle tension, muscle reeducation, PLOC discussed and agreed upon with patient.          Subjective Evaluation    History of Present Illness  Date of surgery: 5/16/2025  Mechanism of injury: surgery  Mechanism of injury: 6/19/25: RE  Pt reports to his therapy citing 0/10 current pain while noting 0/10 pain t/o most days until he is resting for the evening. He denies needing to use pain medication t/o the day however notes he might add this if trying to nap as prolonged positions can increase his pain. Pt offers 5/10 as his peak pain when attempting to go to sleep and notes that this pain typically presents about the anterior shoulder near biceps insertion. Pt is still limited in active motions due to protocol; however he adds that periodic AROM by \"mistake\" feels like it is less restricted. Pt returns to his surgeon on 6/30 to track his progress to this point and speak about potentially removing L sling w/abduction pillow.      Pt reports to therapy s/p repair of nontraumatic complete tear of left rotator cuff taking place on 5/16/25. Pt previously attended PT for b/l shoulder pain prior to receiving this L rotator cuff repair. Pt rates his peak pain at this time as \"4 or 5/10\" typically when moving the LUE by mistake. Pt is currently donning L sling w/ABD pillow. This is to be worn at all times except for when at PT and performing elbow/wrist ROM at this time. He denies paresthesias about the L shoulder, elbow,forearm or hand currently and notes that his pain has been relatively well controlled to this point. Pt looks to return to PLOF including yardwork and tree cutting pain free.           Recurrent probem    Quality of life: good    Patient Goals  Patient goals for " therapy: increased strength, independence with ADLs/IADLs, improved balance and decreased pain    Pain  Current pain ratin  At best pain ratin  At worst pain ratin  Quality: dull ache, sharp and tight  Relieving factors: medications  Aggravating factors: overhead activity and lifting    Social Support  Steps to enter house: yes  Stairs in house: yes   Lives in: multiple-level home  Lives with: adult children and spouse    Employment status: not working  Hand dominance: right          Objective    Objective:     AROM: Thoracic Spine (25)    Extension Min loss  Right Rotation Min loss      Left Rotation Min loss         AROM: Standing  R (25) L (25)  Shoulder flexion  90   NT-Protocol  Shoulder abduction  105   NT-Protocol  Shoulder ER Functional C3   NT-Protocol  Shoulder IR Functional L1   NT-Protocol    Shoulder ER@90  45   NT-Protocol  Shoulder IR   65   NT-Protocol     Wrist Flexion   75   70  Wrist Extension  65   60    Elbow Flexion   140   140  Elbow Extension  0   0    AROM: Supine  R (25) L (25)  Shoulder flexion  120   NT-Protocol  Shoulder abduction  125   NT-Protocol  Shoulder ER@90  55   NT-Protocol  Shoulder IR   70   NT-Protocol    PROM: Supine  R (25) L (25)  Shoulder flexion  130   121*  Shoulder abduction  130   103**  Shoulder ER@90  55   30*  Shoulder IR   70   65    STRENGTH:    R (25) L (25)    Shoulder flexion  4/5   NT-Protocol/5  Shoulder abduction  4/5   NT-Protocol/5  Shoulder ER@90  4-/5*   NT-Protocol/5  Shoulder IR   4/5   NT-Protocol/5  Upper Traps   5/5   NT-Protocol/5  Mid/Lower Traps  4/5   NT-Protocol/5    Wrist Flexion   5/5   5/5  Wrist Extension  5/5   5/5    Elbow Flexion   5/5   NT-Protocol/5  Elbow Extension  5/5   NT-Protocol/5      Posture: Pt's sitting posture is mildly kyphotic about the T/s w/rounding of the R shoulder at this time w/o significant forward head posture.    Cervical Screen: Cervical AROM  "limitations  Flexion  Min-Mod loss (06/19/25)  Extension Min-Mod loss (06/19/25)  R rotation Min-Mod loss (06/19/25)  L rotation Min-Mod loss (06/19/25)  R sidebend Min-Mod loss (06/19/25)  L sidebend Min-Mod loss (06/19/25)  Retraction Min-Mod loss (06/19/25)         Precautions: Past Medical History[1]      DOS 5/16/25  SOC: 5/22/25  FOTO: 5/22/25  POC Expiration: 8/14/25  AUTH EXP: 8/20/25  Last RE:  Daily Treatment Log:  Date 6/10/25 6/13/25 6/16/25 6/19/25 6/2/2025  FOTO    Visit # 6 7 8 9; RE/FOTO 5   AUTH 6/12 7/12 8/12 9/12 5/12   AUTH EXP 8/20/25 8/20/25 8/20/25 8/20/25 8/20/2025   Manual 10'  10' 10'  10'    Flexion/ER/IR RB all planes  SJ all planes SJ all planes  RB all planes                            There Exer 28'  40' 30' 45' 30'    Objective Measures    SJ    PROM table slides flex  5\"x10; 2x flex/scap 5\"x10; 2x flex/scap 5\"x10; 2x scap; trial table slide for flexion today  5\"x10; 2x    Supine cane ER in neutral w/ TR under elbow  5\"x10  5\"x10   5\"x10     Shoulder rolls Bwd shoulder rolls 20x  Bwd shoulder rolls 20x  Bwd shoulder rolls 20x  Bwd shoulder rolls 20x  Bwd shoulder rolls 20x    Scap retractions 5\"x10  5\"x10  5\"x10  5\"x10  5\"x10; 2x    Scap elevations        Pendulums-Horizontal and Vertical 20x each 20x each 20x each 20x each 20x ea    Wrist flexion Supported on table 1# DB 2x10  Supported on table 1# DB 2x10  3# Db; 2x10   Supported on table 1# DB 2x10    Wrist extension Supported on table 1# DB 2x10 Supported on table 1# DB 2x10 3# Db; 2x10   Supported on table 1# DB 2x10     Elbow flexion from neutral Full ROM 2x10  Full ROM 2x10 In standing 2x10 Full ROM  Full ROM 1x10; 2x    Std elbow in 90 flex Sup/pronation  2x10 ea  Holding therabar for increased stretch; 2x10    1x10; 2x    Trap stretching 10\"x5 R/L cerv SB  10\"x5 R/L cerv SB  10\"x5 R/L cerv SB  10\"x5 R/L cerv SB  10\"x5 R/L cerv SB    gripper Blk 35# 20x  Blk 35# 20x  Blk 35# 20x      Table step back; flexion   10x5s; only 3 " steps today (measured at 105 deg in standing  10x5s; only 3 steps today again measured at 105 AAROM                            HEP     Updated & issued    There Activ                                                        NMReed                                                Modalities                                HEP:   Access Code: [email or web address]  URL: [email or web address]  Date: 06/19/2025  Prepared by: Garcia Gibbs    Exercises  - Seated Scapular Retraction  - 1 x daily - 7 x weekly - 1-3 sets - 10 reps  - Standing Shoulder Shrugs  - 1 x daily - 7 x weekly - 1-3 sets - 10 reps  - Flexion-Extension Shoulder Pendulum with Table Support  - 2 x daily - 7 x weekly - 1-3 sets - 10 reps  - Horizontal Shoulder Pendulum with Table Support  - 2 x daily - 7 x weekly - 1-3 sets - 10 reps  - Elbow AROM Flexion/Extension Forearm in Neutral in Supine  - 1 x daily - 7 x weekly - 1-2 sets - 10 reps  - Wrist Flexion AROM  - 1 x daily - 7 x weekly - 1-2 sets - 10 reps  - Wrist Flexion Extension AROM - Palms Down  - 1 x daily - 7 x weekly - 1-2 sets - 10 reps  - Standing Backward Shoulder Rolls  - 1 x daily - 7 x weekly - 1-2 sets - 10 reps  - Standing Upper Trapezius Stretch  - 1 x daily - 7 x weekly - 1-2 sets - 10 reps - 5s hold  - Seated Trunk Rotation - Arms Crossed  - 1 x daily - 7 x weekly - 1-2 sets - 10 reps  - Seated Shoulder Flexion Towel Slide at Table Top  - 1 x daily - 7 x weekly - 2 sets - 10 reps - 5sec hold  - Supine Shoulder Flexion AAROM with Hands Clasped  - 1 x daily - 7 x weekly - 2 sets - 10 reps  - Standing 'L' Stretch at Counter  - 1 x daily - 7 x weekly - 2 sets - 10 reps - 5s hold           [1]   Past Medical History:  Diagnosis Date    Lyme disease

## 2025-06-23 ENCOUNTER — OFFICE VISIT (OUTPATIENT)
Dept: PHYSICAL THERAPY | Facility: CLINIC | Age: 65
End: 2025-06-23
Payer: COMMERCIAL

## 2025-06-23 DIAGNOSIS — M75.122 NONTRAUMATIC COMPLETE TEAR OF ROTATOR CUFF, LEFT: Primary | ICD-10-CM

## 2025-06-23 PROCEDURE — 97110 THERAPEUTIC EXERCISES: CPT

## 2025-06-23 NOTE — PROGRESS NOTES
"Daily Note     Today's date: 2025  Patient name: Mary Russo  : 1960  MRN: [id number]  Referring provider: Graciela Bui PA-C  Dx:   Encounter Diagnosis     ICD-10-CM    1. Nontraumatic complete tear of rotator cuff, left  M75.122                      Subjective: pt reports that overall his shoulder has been feeling okay, he does cont w/ increased pain at night time mostly only in the front of his shoulder. He does f/u w/ the surgeon on .       Objective: See treatment diary below      Assessment: Tolerated treatment well. Pt is 5 weeks post-op at this time, initiation of AAROM today per MD protocol tolerated well w/ mild soreness at end ranges. Unable to tolerate full abd pball table slides due to discomfort, improved tolerance in scaption. No lasting discomfort, pt advised to Patient would benefit from continued PT      Plan: Continue per plan of care.      Precautions: Past Medical History[1]      DOS 25  SOC: 25  FOTO: 25  POC Expiration: 25  AUTH EXP: 25  Last RE:  Daily Treatment Log:  Date 6/10/25 6/13/25 6/16/25 6/19/25 2025   Visit # 6 7 8 9; RE/FOTO 10   AUTH 6/12 7/12 8/12 9/12 10/12   AUTH EXP 25   Manual 10'  10' 10'     Flexion/ER/IR RB all planes  SJ all planes SJ all planes                             There Exer 28'  40' 30' 45' 38'    Objective Measures    SJ    PROM table slides flex  5\"x10; 2x flex/scap 5\"x10; 2x flex/scap 5\"x10; 2x scap; trial table slide for flexion today  Flex/scap  w/ pball 5\"x10 ea   Pulleys      3'    Supine HHA AAROM flex      5\"x10    Supine cane ER in neutral w/ TR under elbow  5\"x10  5\"x10   5\"x10  5\"x10    AAROM cane abd      5\"x10    Shoulder rolls Bwd shoulder rolls 20x  Bwd shoulder rolls 20x  Bwd shoulder rolls 20x  Bwd shoulder rolls 20x  Bwd shoulder rolls 20x    Scap retractions 5\"x10  5\"x10  5\"x10  5\"x10  5\"x10    Scap elevations        Pendulums-Horizontal and Vertical 20x " "each 20x each 20x each 20x each 20x ea   Wrist flexion Supported on table 1# DB 2x10  Supported on table 1# DB 2x10  3# Db; 2x10      Wrist extension Supported on table 1# DB 2x10 Supported on table 1# DB 2x10 3# Db; 2x10      Elbow flexion from neutral Full ROM 2x10  Full ROM 2x10 In standing 2x10 Full ROM     Std elbow in 90 flex Sup/pronation  2x10 ea  Holding therabar for increased stretch; 2x10        Trap stretching 10\"x5 R/L cerv SB  10\"x5 R/L cerv SB  10\"x5 R/L cerv SB  10\"x5 R/L cerv SB  10\"x5 R/L cerv SB    gripper Blk 35# 20x  Blk 35# 20x  Blk 35# 20x      Table step back; flexion   10x5s; only 3 steps today (measured at 105 deg in standing  10x5s; only 3 steps today again measured at 105 AAROM 5\"x10                            HEP        There Activ                                                        NMReed                                                Modalities                                HEP:   Access Code: [email or web address]  URL: [email or web address]  Date: 06/19/2025  Prepared by: Garcia Gibbs    Exercises  - Seated Scapular Retraction  - 1 x daily - 7 x weekly - 1-3 sets - 10 reps  - Standing Shoulder Shrugs  - 1 x daily - 7 x weekly - 1-3 sets - 10 reps  - Flexion-Extension Shoulder Pendulum with Table Support  - 2 x daily - 7 x weekly - 1-3 sets - 10 reps  - Horizontal Shoulder Pendulum with Table Support  - 2 x daily - 7 x weekly - 1-3 sets - 10 reps  - Elbow AROM Flexion/Extension Forearm in Neutral in Supine  - 1 x daily - 7 x weekly - 1-2 sets - 10 reps  - Wrist Flexion AROM  - 1 x daily - 7 x weekly - 1-2 sets - 10 reps  - Wrist Flexion Extension AROM - Palms Down  - 1 x daily - 7 x weekly - 1-2 sets - 10 reps  - Standing Backward Shoulder Rolls  - 1 x daily - 7 x weekly - 1-2 sets - 10 reps  - Standing Upper Trapezius Stretch  - 1 x daily - 7 x weekly - 1-2 sets - 10 reps - 5s hold  - Seated Trunk Rotation - Arms Crossed  - 1 x daily - 7 x weekly - 1-2 sets - 10 reps  - Seated " Shoulder Flexion Towel Slide at Table Top  - 1 x daily - 7 x weekly - 2 sets - 10 reps - 5sec hold  - Supine Shoulder Flexion AAROM with Hands Clasped  - 1 x daily - 7 x weekly - 2 sets - 10 reps  - Standing 'L' Stretch at Counter  - 1 x daily - 7 x weekly - 2 sets - 10 reps - 5s hold      Physical Therapy Protocol: Rotator Cuff Repair Immediate Therapy    Based on [facility] Shoulder Protocol    Modalities: performed at the physical therapist's discretion within the guidelines of this protocol.    Wound Care: Keep the dressing clean and dry.  Light drainage may occur the first 2 days postop.  You may remove the dressings and get the incision wet in the shower 48 hours after surgery.  DO NOT remove steri-strips or sutures.  DO NOT immerse the incision under water.  Carefully pat the incision dry.  If there is wound drainage, re-apply a fresh dry gauze dressing.    Sling: wear the sling at all times, including sleep, except for hygiene for 6 weeks following surgery. Keep the arm by the side during hygiene. The patient may remove the sling, keeping the arm by the side to perform gentle wrist and elbow range of motion. The sling may be removed for PT sessions as described below.    If ARTHROSCOPIC/OPEN SUBPECTORAL BICEPS TENODESIS was performed, the patient can perform passive and active assisted ROM as tolerated. Avoid active and resisted elbow flexion and supination until 6 weeks post-op.    If SUBSCAPULARIS REPAIR was performed, avoid external rotation beyond 30 degrees until 6 weeks post op.    PHASE I (Weeks 0-4): passive range of motion       · Begin passive range of motion within 7 days following surgery. Completed 3 times per week       · Begin scapula exercises with the arm in the sling within 7 days following surgery, including scapular elevation, depression, retraction, and protraction. Completed daily Weeks 0-6       · Begin pendulum exercises at home. Completed 2 times per day    PHASE II (Weeks 4-8): active  assisted range of motion       · Week 4: lying active assisted motion            o Using a stick or cane while lying flat, the nonsurgical arm moves the injured arm through different motions, including forward flexion, external rotation, and abduction. Completed daily       · Weeks 5: 45-degree active assisted motion            o Using a stick or cane while lying propped at 45 degrees, the nonsurgical arm moves the injured arm through different motions, including forward flexion, external rotation, and abduction. Completed daily       · Weeks 6-8: upright active assisted motion            o Using a stick or cane while sitting up or standing, the nonsurgical arm moves the injured arm through different motions, including forward flexion, external rotation, and abduction. Completed daily       · Week 6: scapula exercises without the sling, including scapular elevation, depression, retraction, and protraction. Completed daily    PHASE III (Weeks 8-12): active motion       · AROM: while sitting up or standing, actively forward flex and abduct the shoulders. Completed daily            o Important not to hike the shoulder up towards the ear.       · Isometric exercises: push and pull a folded towel or pillow into a wall. Completed daily, holding push/pull for 15s with 30s rest in between for 10-15 reps            o Completed with elbow flexed at 90 degrees and with shoulder internal/external rotation    PHASE IV (Weeks 12-16): resisted exercise       · Begin rotator cuff strengthening using weights and/or elastic bands. Completed 3 times per week for 3-4 sets of 10-15 reps            o Resisted shoulder internal/external rotation            o Resisted deltoid strengthening            o Resisted scapula strengthening            o AVOID doing full can or empty can exercises       · Begin light shoulder stretching            o Hold each stretch for 15s, then rest 15s. Repeat 5 times            o After Week 16, may use  aggressive stretching if needed                [1]   Past Medical History:  Diagnosis Date    Lyme disease

## 2025-06-26 ENCOUNTER — OFFICE VISIT (OUTPATIENT)
Dept: PHYSICAL THERAPY | Facility: CLINIC | Age: 65
End: 2025-06-26
Payer: COMMERCIAL

## 2025-06-26 DIAGNOSIS — M75.122 NONTRAUMATIC COMPLETE TEAR OF ROTATOR CUFF, LEFT: Primary | ICD-10-CM

## 2025-06-26 PROCEDURE — 97110 THERAPEUTIC EXERCISES: CPT

## 2025-06-26 PROCEDURE — 97140 MANUAL THERAPY 1/> REGIONS: CPT

## 2025-06-26 NOTE — PROGRESS NOTES
"Daily Note     Today's date: 2025  Patient name: Mary Russo  : 1960  MRN: [id number]  Referring provider: Graciela Bui PA-C  Dx:   Encounter Diagnosis     ICD-10-CM    1. Nontraumatic complete tear of rotator cuff, left  M75.122           Start Time: 0800  Stop Time: 0840  Total time in clinic (min): 40 minutes    Subjective: Pt reports that his pain \"comes and goes\" and rates his highest pain \"around 4 or 5\" most often when lying prone.      Objective: See treatment diary below      Assessment: Tolerated treatment well. Pt tolerated AAROM shld flexion, external rotation and abduction well w/o pain using SPC in supine. Pt had minor end rage pain but had no discomfort performing these interventions just prior to end range. Pt has f/u appointment w/his surgeon on . Pt performed trial of standing abduction/flexion AAROM and had no pain while displaying appropriate form.  Patient would benefit from continued PT      Plan: Continue per plan of care.      Precautions: Past Medical History[1]      DOS 25  SOC: 25  FOTO: 25  POC Expiration: 25  AUTH EXP: 25  Last RE:  Daily Treatment Log:  Date 25   Visit # 11 7 8 9; RE/FOTO 10   AUTH  (submitted for more auth today) 7/12 8/12 9/12 10/12   AUTH EXP 25   Manual 10' 10' 10'     Flexion/ER/IR SJ all planes SJ all planes SJ all planes                             There Exer 30' 40' 30' 45' 38'    Objective Measures    SJ    PROM table slides flex   5\"x10; 2x flex/scap 5\"x10; 2x scap; trial table slide for flexion today  Flex/scap  w/ pball 5\"x10 ea   Pulleys  3'- 1 min per direction    3'    Supine HHA AAROM flex  W/SPC; 2x10; 10x additional times trial in standing    5\"x10    Supine cane ER in neutral w/ TR under elbow  5\"x10; 2 sets 5\"x10   5\"x10  5\"x10    AAROM cane abd  In supine; 1x10 w/SPC; 10x in standing    5\"x10    Shoulder rolls Bwd shoulder " "rolls 20x  Bwd shoulder rolls 20x  Bwd shoulder rolls 20x  Bwd shoulder rolls 20x  Bwd shoulder rolls 20x    Scap retractions  5\"x10  5\"x10  5\"x10  5\"x10    Scap elevations        Pendulums-Horizontal and Vertical 20x each 20x each 20x each 20x each 20x ea   Wrist flexion  Supported on table 1# DB 2x10  3# Db; 2x10      Wrist extension  Supported on table 1# DB 2x10 3# Db; 2x10      Elbow flexion from neutral  Full ROM 2x10 In standing 2x10 Full ROM     Std elbow in 90 flex Sup/pronation   Holding therabar for increased stretch; 2x10        Trap stretching 10\"x5 R/L cerv SB  10\"x5 R/L cerv SB  10\"x5 R/L cerv SB  10\"x5 R/L cerv SB  10\"x5 R/L cerv SB    gripper  Blk 35# 20x  Blk 35# 20x      Table step back; flexion   10x5s; only 3 steps today (measured at 105 deg in standing  10x5s; only 3 steps today again measured at 105 AAROM 5\"x10                            HEP To be updated NV following protocol change       There Activ                                                        NMReed                                                Modalities                                HEP:   Access Code: [email or web address]  URL: [email or web address]  Date: 06/19/2025  Prepared by: Garcia Gibbs    Exercises  - Seated Scapular Retraction  - 1 x daily - 7 x weekly - 1-3 sets - 10 reps  - Standing Shoulder Shrugs  - 1 x daily - 7 x weekly - 1-3 sets - 10 reps  - Flexion-Extension Shoulder Pendulum with Table Support  - 2 x daily - 7 x weekly - 1-3 sets - 10 reps  - Horizontal Shoulder Pendulum with Table Support  - 2 x daily - 7 x weekly - 1-3 sets - 10 reps  - Elbow AROM Flexion/Extension Forearm in Neutral in Supine  - 1 x daily - 7 x weekly - 1-2 sets - 10 reps  - Wrist Flexion AROM  - 1 x daily - 7 x weekly - 1-2 sets - 10 reps  - Wrist Flexion Extension AROM - Palms Down  - 1 x daily - 7 x weekly - 1-2 sets - 10 reps  - Standing Backward Shoulder Rolls  - 1 x daily - 7 x weekly - 1-2 sets - 10 reps  - Standing Upper " Trapezius Stretch  - 1 x daily - 7 x weekly - 1-2 sets - 10 reps - 5s hold  - Seated Trunk Rotation - Arms Crossed  - 1 x daily - 7 x weekly - 1-2 sets - 10 reps  - Seated Shoulder Flexion Towel Slide at Table Top  - 1 x daily - 7 x weekly - 2 sets - 10 reps - 5sec hold  - Supine Shoulder Flexion AAROM with Hands Clasped  - 1 x daily - 7 x weekly - 2 sets - 10 reps  - Standing 'L' Stretch at Counter  - 1 x daily - 7 x weekly - 2 sets - 10 reps - 5s hold      Physical Therapy Protocol: Rotator Cuff Repair Immediate Therapy    Based on Moon Shoulder Protocol    Modalities: performed at the physical therapist's discretion within the guidelines of this protocol.    Wound Care: Keep the dressing clean and dry.  Light drainage may occur the first 2 days postop.  You may remove the dressings and get the incision wet in the shower 48 hours after surgery.  DO NOT remove steri-strips or sutures.  DO NOT immerse the incision under water.  Carefully pat the incision dry.  If there is wound drainage, re-apply a fresh dry gauze dressing.    Sling: wear the sling at all times, including sleep, except for hygiene for 6 weeks following surgery. Keep the arm by the side during hygiene. The patient may remove the sling, keeping the arm by the side to perform gentle wrist and elbow range of motion. The sling may be removed for PT sessions as described below.    If ARTHROSCOPIC/OPEN SUBPECTORAL BICEPS TENODESIS was performed, the patient can perform passive and active assisted ROM as tolerated. Avoid active and resisted elbow flexion and supination until 6 weeks post-op.    If SUBSCAPULARIS REPAIR was performed, avoid external rotation beyond 30 degrees until 6 weeks post op.    PHASE I (Weeks 0-4): passive range of motion       · Begin passive range of motion within 7 days following surgery. Completed 3 times per week       · Begin scapula exercises with the arm in the sling within 7 days following surgery, including scapular  elevation, depression, retraction, and protraction. Completed daily Weeks 0-6       · Begin pendulum exercises at home. Completed 2 times per day    PHASE II (Weeks 4-8): active assisted range of motion       · Week 4: lying active assisted motion            o Using a stick or cane while lying flat, the nonsurgical arm moves the injured arm through different motions, including forward flexion, external rotation, and abduction. Completed daily       · Weeks 5: 45-degree active assisted motion            o Using a stick or cane while lying propped at 45 degrees, the nonsurgical arm moves the injured arm through different motions, including forward flexion, external rotation, and abduction. Completed daily       · Weeks 6-8: upright active assisted motion            o Using a stick or cane while sitting up or standing, the nonsurgical arm moves the injured arm through different motions, including forward flexion, external rotation, and abduction. Completed daily       · Week 6: scapula exercises without the sling, including scapular elevation, depression, retraction, and protraction. Completed daily    PHASE III (Weeks 8-12): active motion       · AROM: while sitting up or standing, actively forward flex and abduct the shoulders. Completed daily            o Important not to hike the shoulder up towards the ear.       · Isometric exercises: push and pull a folded towel or pillow into a wall. Completed daily, holding push/pull for 15s with 30s rest in between for 10-15 reps            o Completed with elbow flexed at 90 degrees and with shoulder internal/external rotation    PHASE IV (Weeks 12-16): resisted exercise       · Begin rotator cuff strengthening using weights and/or elastic bands. Completed 3 times per week for 3-4 sets of 10-15 reps            o Resisted shoulder internal/external rotation            o Resisted deltoid strengthening            o Resisted scapula strengthening            o AVOID doing full  can or empty can exercises       · Begin light shoulder stretching            o Hold each stretch for 15s, then rest 15s. Repeat 5 times            o After Week 16, may use aggressive stretching if needed                   [1]   Past Medical History:  Diagnosis Date    Lyme disease

## 2025-06-30 ENCOUNTER — OFFICE VISIT (OUTPATIENT)
Dept: PHYSICAL THERAPY | Facility: CLINIC | Age: 65
End: 2025-06-30
Payer: COMMERCIAL

## 2025-06-30 ENCOUNTER — OFFICE VISIT (OUTPATIENT)
Dept: OBGYN CLINIC | Facility: CLINIC | Age: 65
End: 2025-06-30

## 2025-06-30 VITALS — BODY MASS INDEX: 27.16 KG/M2 | WEIGHT: 163 LBS | HEIGHT: 65 IN

## 2025-06-30 DIAGNOSIS — M75.122 NONTRAUMATIC COMPLETE TEAR OF ROTATOR CUFF, LEFT: Primary | ICD-10-CM

## 2025-06-30 DIAGNOSIS — Z98.890 S/P LEFT ROTATOR CUFF REPAIR: ICD-10-CM

## 2025-06-30 DIAGNOSIS — M75.122 NONTRAUMATIC COMPLETE TEAR OF LEFT ROTATOR CUFF: Primary | ICD-10-CM

## 2025-06-30 PROCEDURE — 97110 THERAPEUTIC EXERCISES: CPT

## 2025-06-30 PROCEDURE — 99024 POSTOP FOLLOW-UP VISIT: CPT | Performed by: ORTHOPAEDIC SURGERY

## 2025-06-30 NOTE — PROGRESS NOTES
Patient Name: Mary Russo      : 1960       MRN: [id number]   Encounter Provider: Lukasz Jin MD   Encounter Date: 25  Encounter department: [facility]         Assessment & Plan  Nontraumatic complete tear of left rotator cuff  S/P left rotator cuff repair    Mary is doing well.  I am pleased with his progress evident today.  He can now begin to wean out of his sling and discontinue its use completely at the 8-week oscar.  He will continue with physical therapy.  We discussed that the repair is still healing and he should not lift anything heavier than a coffee cup.  He should not perform any new activities unless done so under the guidance of his physical therapist.  He verbalized an understanding.  I will see him back in 6 weeks.              Follow-up:  No follow-ups on file.     _____________________________________________________  CHIEF COMPLAINT:  Chief Complaint   Patient presents with    Left Shoulder - Post-op         SUBJECTIVE:  Mary Russo is a 65 y.o. male who presents today for the 6-week postop visit of the left shoulder arthroscopy with rotator cuff repair.  The patient states that he is doing well.  He has no complaints of excessive pain.  His only complaint is of mild soreness anteriorly following physical therapy or his home exercises.  He states he has been compliant with the use of the sling.    PAST MEDICAL HISTORY:  Past Medical History[1]    PAST SURGICAL HISTORY:  Past Surgical History[2]    FAMILY HISTORY:  Family History[3]    SOCIAL HISTORY:  Social History[4]    MEDICATIONS:  Current Medications[5]    ALLERGIES:  Allergies[6]    LABS:  HgA1c:   Lab Results   Component Value Date    HGBA1C 5.8 (H) 2025     BMP:   Lab Results   Component Value Date    K 4.6 2025    CO2 21 2025     2025    BUN 26 2025    CREATININE 0.87 2025     CBC: No components found for:  "\"CBC\"    _____________________________________________________  Review of Systems     Right Shoulder Exam     Range of Motion   External rotation:  60       Left Shoulder Exam     Range of Motion   Passive abduction:  90   External rotation:  20   Forward flexion:  100   Left shoulder internal rotation 0 degrees: Sacrum.     Muscle Strength   Left shoulder normal muscle strength: 4+/5 external rotation.  Internal rotation: 5/5   External rotation: 4/5   Supraspinatus: 4/5     Tests   Drop arm: negative    Other   Erythema: absent  Scars: present  Sensation: normal  Pulse: present (2+ radial)              Physical Exam   _____________________________________________________  STUDIES REVIEWED:  I personally reviewed the pertinent images and my independent interpretation is as follows:      PROCEDURES PERFORMED:  No procedures performed today.       [1]   Past Medical History:  Diagnosis Date    Lyme disease    [2]   Past Surgical History:  Procedure Laterality Date    BACK SURGERY      ID SURGICAL ARTHROSCOPY SHOULDER W/ROTATOR CUFF RPR Left 5/16/2025    Procedure: REPAIR ROTATOR CUFF  ARTHROSCOPIC;  Surgeon: Lukasz Jin MD;  Location: [facility];  Service: Orthopedics   [3]   Family History  Problem Relation Name Age of Onset    No Known Problems Mother      No Known Problems Sister      Heart Valve Disease Brother oldest     No Known Problems Maternal Aunt      No Known Problems Maternal Uncle      No Known Problems Paternal Aunt      No Known Problems Paternal Uncle      No Known Problems Maternal Grandmother      No Known Problems Maternal Grandfather      No Known Problems Paternal Grandmother      No Known Problems Paternal Grandfather     [4]   Social History  Tobacco Use    Smoking status: Every Day     Types: Cigarettes     Passive exposure: Past    Smokeless tobacco: Never   Vaping Use    Vaping status: Never Used   Substance Use Topics    Alcohol use: Yes    Drug use: Never   [5]   Current " Outpatient Medications:     acetaminophen (TYLENOL) 500 mg tablet, Take 2 tablets (1,000 mg total) by mouth every 8 (eight) hours, Disp: 60 tablet, Rfl: 0    naproxen (Naprosyn) 500 mg tablet, Take 1 tablet (500 mg total) by mouth 2 (two) times a day with meals, Disp: 20 tablet, Rfl: 0    oxyCODONE (Roxicodone) 5 immediate release tablet, Take 1 tablet (5 mg total) by mouth every 6 (six) hours as needed for severe pain for up to 10 doses Max Daily Amount: 20 mg, Disp: 10 tablet, Rfl: 0    tadalafil (CIALIS) 5 MG tablet, Take 1 tablet (5 mg total) by mouth daily, Disp: 90 tablet, Rfl: 3  [6] No Known Allergies

## 2025-06-30 NOTE — PROGRESS NOTES
"Daily Note     Today's date: 2025  Patient name: Mary Russo  : 1960  MRN: [id number]  Referring provider: Graciela Bui PA-C  Dx:   Encounter Diagnosis     ICD-10-CM    1. Nontraumatic complete tear of rotator cuff, left  M75.122                      Subjective: pt reports that he has a f/u w/ his MD today after his PT appt. Overall feels he is tolerating the TE w/o complaints at this time. He does feel his sleeping has been better.       Objective: See treatment diary below      Assessment: Tolerated treatment well. Pt able to perform AAROM in standing today w/ good tolerance, HEP updated today to reflect progressions and available activities per MD protocol. Patient would benefit from continued PT      Plan: Continue per plan of care.      Precautions: Past Medical History[1]      DOS 25  SOC: 25  FOTO: 25  POC Expiration: 25  AUTH EXP: 25  Last RE:  Daily Treatment Log:  Date 25   Visit # 11 12  9; RE/FOTO 10   AUTH  (submitted for more auth today) 12/12  9/12 10/12   AUTH EXP 25   Manual 10'       Flexion/ER/IR SJ all planes                               There Exer 30' 40'   45' 38'    Objective Measures    SJ    PROM table slides flex   Wall slides B/L to support L UE 1x10    Flex/scap  w/ pball 5\"x10 ea   Pulleys  3'- 1 min per direction 5' flex/abd    3'    Supine HHA AAROM flex  W/SPC; 2x10; 10x additional times trial in standing Std @ wall 1x10   5\"x10    Supine cane ER in neutral w/ TR under elbow  5\"x10; 2 sets Std w/ TR @ elbow 5\"x10  5\"x10  5\"x10    AAROM cane abd  In supine; 1x10 w/SPC; 10x in standing Std 1x10; 2x    5\"x10    Shoulder rolls Bwd shoulder rolls 20x  Bwd shoulder rolls 20x   Bwd shoulder rolls 20x  Bwd shoulder rolls 20x    Scap retractions    5\"x10  5\"x10    Scap elevations        Pendulums-Horizontal and Vertical 20x each 20x each  20x each 20x ea   Wrist flexion        Wrist " "extension        Elbow flexion from neutral        Std elbow in 90 flex Sup/pronation          Trap stretching 10\"x5 R/L cerv SB  10\"x5 R/L cerv SB   10\"x5 R/L cerv SB  10\"x5 R/L cerv SB    gripper        Table step back; flexion    10x5s; only 3 steps today again measured at 105 AAROM 5\"x10                            HEP To be updated NV following protocol change Updated & issued       There Activ                                                        NMReed                                                Modalities                                HEP:   Access Code: [email or web address]  URL: [email or web address]  Date: 06/30/2025  Prepared by: Jayleen Almodovar    Exercises  - Seated Scapular Retraction  - 1 x daily - 7 x weekly - 1-3 sets - 10 reps  - Flexion-Extension Shoulder Pendulum with Table Support  - 2 x daily - 7 x weekly - 1-3 sets - 10 reps  - Horizontal Shoulder Pendulum with Table Support  - 2 x daily - 7 x weekly - 1-3 sets - 10 reps  - Standing Upper Trapezius Stretch  - 1 x daily - 7 x weekly - 1-2 sets - 10 reps - 5s hold  - Supine Shoulder Flexion AAROM with Hands Clasped  - 1 x daily - 7 x weekly - 2 sets - 10 reps  - Standing 'L' Stretch at Counter  - 1 x daily - 7 x weekly - 2 sets - 10 reps - 5s hold  - Shoulder Flexion Wall Slide with Towel  - 1 x daily - 7 x weekly - 1-2 sets - 10 reps  - Standing Shoulder Flexion AAROM with Dowel  - 1 x daily - 7 x weekly - 1-2 sets - 10 reps  - Standing Shoulder Abduction AAROM with Dowel  - 1 x daily - 7 x weekly - 1-2 sets - 10 reps      Physical Therapy Protocol: Rotator Cuff Repair Immediate Therapy    Based on Moon Shoulder Protocol    Modalities: performed at the physical therapist's discretion within the guidelines of this protocol.    Wound Care: Keep the dressing clean and dry.  Light drainage may occur the first 2 days postop.  You may remove the dressings and get the incision wet in the shower 48 hours after surgery.  DO NOT remove steri-strips " or sutures.  DO NOT immerse the incision under water.  Carefully pat the incision dry.  If there is wound drainage, re-apply a fresh dry gauze dressing.    Sling: wear the sling at all times, including sleep, except for hygiene for 6 weeks following surgery. Keep the arm by the side during hygiene. The patient may remove the sling, keeping the arm by the side to perform gentle wrist and elbow range of motion. The sling may be removed for PT sessions as described below.    If ARTHROSCOPIC/OPEN SUBPECTORAL BICEPS TENODESIS was performed, the patient can perform passive and active assisted ROM as tolerated. Avoid active and resisted elbow flexion and supination until 6 weeks post-op.    If SUBSCAPULARIS REPAIR was performed, avoid external rotation beyond 30 degrees until 6 weeks post op.    PHASE I (Weeks 0-4): passive range of motion       · Begin passive range of motion within 7 days following surgery. Completed 3 times per week       · Begin scapula exercises with the arm in the sling within 7 days following surgery, including scapular elevation, depression, retraction, and protraction. Completed daily Weeks 0-6       · Begin pendulum exercises at home. Completed 2 times per day    PHASE II (Weeks 4-8): active assisted range of motion       · Week 4: lying active assisted motion            o Using a stick or cane while lying flat, the nonsurgical arm moves the injured arm through different motions, including forward flexion, external rotation, and abduction. Completed daily       · Weeks 5: 45-degree active assisted motion            o Using a stick or cane while lying propped at 45 degrees, the nonsurgical arm moves the injured arm through different motions, including forward flexion, external rotation, and abduction. Completed daily       · Weeks 6-8: upright active assisted motion            o Using a stick or cane while sitting up or standing, the nonsurgical arm moves the injured arm through different motions,  including forward flexion, external rotation, and abduction. Completed daily       · Week 6: scapula exercises without the sling, including scapular elevation, depression, retraction, and protraction. Completed daily    PHASE III (Weeks 8-12): active motion       · AROM: while sitting up or standing, actively forward flex and abduct the shoulders. Completed daily            o Important not to hike the shoulder up towards the ear.       · Isometric exercises: push and pull a folded towel or pillow into a wall. Completed daily, holding push/pull for 15s with 30s rest in between for 10-15 reps            o Completed with elbow flexed at 90 degrees and with shoulder internal/external rotation    PHASE IV (Weeks 12-16): resisted exercise       · Begin rotator cuff strengthening using weights and/or elastic bands. Completed 3 times per week for 3-4 sets of 10-15 reps            o Resisted shoulder internal/external rotation            o Resisted deltoid strengthening            o Resisted scapula strengthening            o AVOID doing full can or empty can exercises       · Begin light shoulder stretching            o Hold each stretch for 15s, then rest 15s. Repeat 5 times            o After Week 16, may use aggressive stretching if needed                      [1]   Past Medical History:  Diagnosis Date    Lyme disease

## 2025-06-30 NOTE — ASSESSMENT & PLAN NOTE
Mary is doing well.  I am pleased with his progress evident today.  He can now begin to wean out of his sling and discontinue its use completely at the 8-week oscar.  He will continue with physical therapy.  We discussed that the repair is still healing and he should not lift anything heavier than a coffee cup.  He should not perform any new activities unless done so under the guidance of his physical therapist.  He verbalized an understanding.  I will see him back in 6 weeks.

## 2025-07-02 ENCOUNTER — TELEPHONE (OUTPATIENT)
Dept: OBGYN CLINIC | Facility: CLINIC | Age: 65
End: 2025-07-02

## 2025-07-03 ENCOUNTER — OFFICE VISIT (OUTPATIENT)
Dept: PHYSICAL THERAPY | Facility: CLINIC | Age: 65
End: 2025-07-03
Payer: COMMERCIAL

## 2025-07-03 DIAGNOSIS — M75.122 NONTRAUMATIC COMPLETE TEAR OF ROTATOR CUFF, LEFT: Primary | ICD-10-CM

## 2025-07-03 PROCEDURE — 97110 THERAPEUTIC EXERCISES: CPT

## 2025-07-03 PROCEDURE — 97112 NEUROMUSCULAR REEDUCATION: CPT

## 2025-07-03 NOTE — PROGRESS NOTES
"Daily Note     Today's date: 7/3/2025  Patient name: Mary Russo  : 1960  MRN: [id number]  Referring provider: Graciela Bui PA-C  Dx:   Encounter Diagnosis     ICD-10-CM    1. Nontraumatic complete tear of rotator cuff, left  M75.122                      Subjective: pt reports that he saw the MD and he is pleased w/ his progress thus far. He presents to session w/o his sling per MD. Pt reports he had mild soreness after progressive AAROM last visit but it wasn't bad        Objective: See treatment diary below      Assessment: Tolerated treatment well.Pt tolerating AAROM well at this time w/o significant increases in L shlder pain. Initiation of shoulder isometrics w/o complaint today, next week patient is able to start progressing to AROM as tolerated.  Pt edu on sling wear time, if he is getting very sore w/o it or going into a public crowded place he should cont use intermittently but otherwise is free to keep it removed. Patient would benefit from continued PT      Plan: Continue per plan of care.  Next week patient is 8 weeks post op; progress and trial AROM as tolerated per MD protocol      Precautions: Past Medical History[1]      DOS 25  SOC: 25  FOTO: 25  POC Expiration: 25  AUTH EXP: 25  Last RE:  Daily Treatment Log:  Date 6/26/25 6/30/25 7/3/2025  2025   Visit # 11 12 13  10   AUTH  (submitted for more auth today) 12/12 13/24  10/12   AUTH EXP 25   Manual 10'       Flexion/ER/IR SJ all planes                               There Exer 30' 40'  30'   38'    Objective Measures        PROM table slides flex   Wall slides B/L to support L UE 1x10  Wall slides B/L UE support 1x10   Flex/scap  w/ pball 5\"x10 ea   Pulleys  3'- 1 min per direction 5' flex/abd  5' flex/abd   3'    Supine HHA AAROM flex  W/SPC; 2x10; 10x additional times trial in standing Std @ wall 1x10 Std @ wall 1x10; 2x   5\"x10    Supine cane ER in neutral w/ TR under " "elbow  5\"x10; 2 sets Std w/ TR @ elbow 5\"x10 Std w/ TR @ elbow 5\"x10   5\"x10    AAROM cane abd  In supine; 1x10 w/SPC; 10x in standing Std 1x10; 2x  Std 1x10; 2x   5\"x10    Shoulder rolls Bwd shoulder rolls 20x  Bwd shoulder rolls 20x    Bwd shoulder rolls 20x    Scap retractions   Rows NV   5\"x10    B/L shoulder ext    NV      Pendulums-Horizontal and Vertical 20x each 20x each 2# DB 20x ea   20x ea   Elbow flexion from neutral   1# DB x10  2# DB x10       Trap stretching 10\"x5 R/L cerv SB  10\"x5 R/L cerv SB    10\"x5 R/L cerv SB    Table step back; flexion     5\"x10    Supine AROM flex    NV     SL AROM abd   NV     B/L ER    NV                              HEP To be updated NV following protocol change Updated & issued       There Activ                                                        NMReed   10'      6 way isometrics @ wall    5\"x10 ea                                      Modalities                                HEP:   Access Code: [email or web address]  URL: [email or web address]  Date: 06/30/2025  Prepared by: Jayleen Almodovar    Exercises  - Seated Scapular Retraction  - 1 x daily - 7 x weekly - 1-3 sets - 10 reps  - Flexion-Extension Shoulder Pendulum with Table Support  - 2 x daily - 7 x weekly - 1-3 sets - 10 reps  - Horizontal Shoulder Pendulum with Table Support  - 2 x daily - 7 x weekly - 1-3 sets - 10 reps  - Standing Upper Trapezius Stretch  - 1 x daily - 7 x weekly - 1-2 sets - 10 reps - 5s hold  - Supine Shoulder Flexion AAROM with Hands Clasped  - 1 x daily - 7 x weekly - 2 sets - 10 reps  - Standing 'L' Stretch at Counter  - 1 x daily - 7 x weekly - 2 sets - 10 reps - 5s hold  - Shoulder Flexion Wall Slide with Towel  - 1 x daily - 7 x weekly - 1-2 sets - 10 reps  - Standing Shoulder Flexion AAROM with Dowel  - 1 x daily - 7 x weekly - 1-2 sets - 10 reps  - Standing Shoulder Abduction AAROM with Dowel  - 1 x daily - 7 x weekly - 1-2 sets - 10 reps      Physical Therapy Protocol: Rotator Cuff " Repair Immediate Therapy    Based on Moon Shoulder Protocol    Modalities: performed at the physical therapist's discretion within the guidelines of this protocol.    Wound Care: Keep the dressing clean and dry.  Light drainage may occur the first 2 days postop.  You may remove the dressings and get the incision wet in the shower 48 hours after surgery.  DO NOT remove steri-strips or sutures.  DO NOT immerse the incision under water.  Carefully pat the incision dry.  If there is wound drainage, re-apply a fresh dry gauze dressing.    Sling: wear the sling at all times, including sleep, except for hygiene for 6 weeks following surgery. Keep the arm by the side during hygiene. The patient may remove the sling, keeping the arm by the side to perform gentle wrist and elbow range of motion. The sling may be removed for PT sessions as described below.    If ARTHROSCOPIC/OPEN SUBPECTORAL BICEPS TENODESIS was performed, the patient can perform passive and active assisted ROM as tolerated. Avoid active and resisted elbow flexion and supination until 6 weeks post-op.    If SUBSCAPULARIS REPAIR was performed, avoid external rotation beyond 30 degrees until 6 weeks post op.    PHASE I (Weeks 0-4): passive range of motion       · Begin passive range of motion within 7 days following surgery. Completed 3 times per week       · Begin scapula exercises with the arm in the sling within 7 days following surgery, including scapular elevation, depression, retraction, and protraction. Completed daily Weeks 0-6       · Begin pendulum exercises at home. Completed 2 times per day    PHASE II (Weeks 4-8): active assisted range of motion       · Week 4: lying active assisted motion            o Using a stick or cane while lying flat, the nonsurgical arm moves the injured arm through different motions, including forward flexion, external rotation, and abduction. Completed daily       · Weeks 5: 45-degree active assisted motion            o  Using a stick or cane while lying propped at 45 degrees, the nonsurgical arm moves the injured arm through different motions, including forward flexion, external rotation, and abduction. Completed daily       · Weeks 6-8: upright active assisted motion            o Using a stick or cane while sitting up or standing, the nonsurgical arm moves the injured arm through different motions, including forward flexion, external rotation, and abduction. Completed daily       · Week 6: scapula exercises without the sling, including scapular elevation, depression, retraction, and protraction. Completed daily    PHASE III (Weeks 8-12): active motion       · AROM: while sitting up or standing, actively forward flex and abduct the shoulders. Completed daily            o Important not to hike the shoulder up towards the ear.       · Isometric exercises: push and pull a folded towel or pillow into a wall. Completed daily, holding push/pull for 15s with 30s rest in between for 10-15 reps            o Completed with elbow flexed at 90 degrees and with shoulder internal/external rotation    PHASE IV (Weeks 12-16): resisted exercise       · Begin rotator cuff strengthening using weights and/or elastic bands. Completed 3 times per week for 3-4 sets of 10-15 reps            o Resisted shoulder internal/external rotation            o Resisted deltoid strengthening            o Resisted scapula strengthening            o AVOID doing full can or empty can exercises       · Begin light shoulder stretching            o Hold each stretch for 15s, then rest 15s. Repeat 5 times            o After Week 16, may use aggressive stretching if needed                         [1]   Past Medical History:  Diagnosis Date    Lyme disease

## 2025-07-07 ENCOUNTER — OFFICE VISIT (OUTPATIENT)
Dept: PHYSICAL THERAPY | Facility: CLINIC | Age: 65
End: 2025-07-07
Payer: COMMERCIAL

## 2025-07-07 DIAGNOSIS — M75.122 NONTRAUMATIC COMPLETE TEAR OF ROTATOR CUFF, LEFT: Primary | ICD-10-CM

## 2025-07-07 PROCEDURE — 97110 THERAPEUTIC EXERCISES: CPT

## 2025-07-07 PROCEDURE — 97112 NEUROMUSCULAR REEDUCATION: CPT

## 2025-07-07 NOTE — PROGRESS NOTES
"Daily Note     Today's date: 2025  Patient name: Mary Russo  : 1960  MRN: [id number]  Referring provider: Graciela Bui PA-C  Dx:   Encounter Diagnosis     ICD-10-CM    1. Nontraumatic complete tear of rotator cuff, left  M75.122                      Subjective: Pt reports to therapy citing no current pain however continues to wake w/morning soreness that dissipates as he gets moving and can return following increased activity and/or HEP.       Objective: See treatment diary below      Assessment: Tolerated treatment well. Pt performed AAROM in standing as per new protocol guidelines. PT had no pain and showed improved ROM following second set of this interventions. HEP to be updated to include this NV barring positive subjective report NV. Addition of delt isometrics were tolerated well again and performed w/proper form. Progress as tolerated NV.  Patient would benefit from continued PT      Plan: Continue per plan of care.      Precautions: Past Medical History[1]      DOS 25  SOC: 25  FOTO: 25  POC Expiration: 25  AUTH EXP: 25  Last RE:  Daily Treatment Log:  Date 6/26/25 6/30/25 7/3/2025 7/7/25; FOTO    Visit # 11 12 13 14    AUTH  (submitted for more auth today)     AUTH EXP 25    Manual 10'       Flexion/ER/IR SJ all planes                               There Exer 30' 40'  30'  30'    Objective Measures        PROM table slides flex   Wall slides B/L to support L UE 1x10  Wall slides B/L UE support 1x10  Wall slides B/L UE support 1x10     Pulleys  3'- 1 min per direction 5' flex/abd  5' flex/abd      Supine HHA AAROM flex  W/SPC; 2x10; 10x additional times trial in standing Std @ wall 1x10 Std @ wall 1x10; 2x  Std @ wall 1x10; 2x     Supine cane ER in neutral w/ TR under elbow  5\"x10; 2 sets Std w/ TR @ elbow 5\"x10 Std w/ TR @ elbow 5\"x10  Std w/ TR @ elbow 5\"x10     AAROM cane abd  In supine; 1x10 w/SPC; 10x in " "standing Std 1x10; 2x  Std 1x10; 2x  Std 1x10; 2x     Shoulder rolls Bwd shoulder rolls 20x  Bwd shoulder rolls 20x       Scap retractions   Rows NV  NV-soreness at end of session Today s tolerated   B/L shoulder ext    NV  2x10 in standing w/SPC    Pendulums-Horizontal and Vertical 20x each 20x each 2# DB 20x ea      Elbow flexion from neutral   1# DB x10  2# DB x10   3# DB; 2x10 standing    Trap stretching 10\"x5 R/L cerv SB  10\"x5 R/L cerv SB       Table step back; flexion        Supine AROM flex    NV Standing; 2x10 w/SPC    SL AROM abd   NV Standing; 2x10 w/SPC    B/L ER    NV  Standing w/ SPC and TR; 2x10 R/L                            HEP To be updated NV following protocol change Updated & issued       There Activ                                                        NMReed   10'  10'    6 way isometrics @ wall    5\"x10 ea  5\"x10 ea                                     Modalities                                HEP:   Access Code: [email or web address]  URL: [email or web address]  Date: 06/30/2025  Prepared by: Jayleen Almodovar    Exercises  - Seated Scapular Retraction  - 1 x daily - 7 x weekly - 1-3 sets - 10 reps  - Flexion-Extension Shoulder Pendulum with Table Support  - 2 x daily - 7 x weekly - 1-3 sets - 10 reps  - Horizontal Shoulder Pendulum with Table Support  - 2 x daily - 7 x weekly - 1-3 sets - 10 reps  - Standing Upper Trapezius Stretch  - 1 x daily - 7 x weekly - 1-2 sets - 10 reps - 5s hold  - Supine Shoulder Flexion AAROM with Hands Clasped  - 1 x daily - 7 x weekly - 2 sets - 10 reps  - Standing 'L' Stretch at Counter  - 1 x daily - 7 x weekly - 2 sets - 10 reps - 5s hold  - Shoulder Flexion Wall Slide with Towel  - 1 x daily - 7 x weekly - 1-2 sets - 10 reps  - Standing Shoulder Flexion AAROM with Dowel  - 1 x daily - 7 x weekly - 1-2 sets - 10 reps  - Standing Shoulder Abduction AAROM with Dowel  - 1 x daily - 7 x weekly - 1-2 sets - 10 reps      Physical Therapy Protocol: Rotator Cuff " Repair Immediate Therapy    Based on Moon Shoulder Protocol    Modalities: performed at the physical therapist's discretion within the guidelines of this protocol.    Wound Care: Keep the dressing clean and dry.  Light drainage may occur the first 2 days postop.  You may remove the dressings and get the incision wet in the shower 48 hours after surgery.  DO NOT remove steri-strips or sutures.  DO NOT immerse the incision under water.  Carefully pat the incision dry.  If there is wound drainage, re-apply a fresh dry gauze dressing.    Sling: wear the sling at all times, including sleep, except for hygiene for 6 weeks following surgery. Keep the arm by the side during hygiene. The patient may remove the sling, keeping the arm by the side to perform gentle wrist and elbow range of motion. The sling may be removed for PT sessions as described below.    If ARTHROSCOPIC/OPEN SUBPECTORAL BICEPS TENODESIS was performed, the patient can perform passive and active assisted ROM as tolerated. Avoid active and resisted elbow flexion and supination until 6 weeks post-op.    If SUBSCAPULARIS REPAIR was performed, avoid external rotation beyond 30 degrees until 6 weeks post op.    PHASE I (Weeks 0-4): passive range of motion       · Begin passive range of motion within 7 days following surgery. Completed 3 times per week       · Begin scapula exercises with the arm in the sling within 7 days following surgery, including scapular elevation, depression, retraction, and protraction. Completed daily Weeks 0-6       · Begin pendulum exercises at home. Completed 2 times per day    PHASE II (Weeks 4-8): active assisted range of motion       · Week 4: lying active assisted motion            o Using a stick or cane while lying flat, the nonsurgical arm moves the injured arm through different motions, including forward flexion, external rotation, and abduction. Completed daily       · Weeks 5: 45-degree active assisted motion            o  Using a stick or cane while lying propped at 45 degrees, the nonsurgical arm moves the injured arm through different motions, including forward flexion, external rotation, and abduction. Completed daily       · Weeks 6-8: upright active assisted motion            o Using a stick or cane while sitting up or standing, the nonsurgical arm moves the injured arm through different motions, including forward flexion, external rotation, and abduction. Completed daily       · Week 6: scapula exercises without the sling, including scapular elevation, depression, retraction, and protraction. Completed daily    PHASE III (Weeks 8-12): active motion       · AROM: while sitting up or standing, actively forward flex and abduct the shoulders. Completed daily            o Important not to hike the shoulder up towards the ear.       · Isometric exercises: push and pull a folded towel or pillow into a wall. Completed daily, holding push/pull for 15s with 30s rest in between for 10-15 reps            o Completed with elbow flexed at 90 degrees and with shoulder internal/external rotation    PHASE IV (Weeks 12-16): resisted exercise       · Begin rotator cuff strengthening using weights and/or elastic bands. Completed 3 times per week for 3-4 sets of 10-15 reps            o Resisted shoulder internal/external rotation            o Resisted deltoid strengthening            o Resisted scapula strengthening            o AVOID doing full can or empty can exercises       · Begin light shoulder stretching            o Hold each stretch for 15s, then rest 15s. Repeat 5 times            o After Week 16, may use aggressive stretching if needed                            [1]   Past Medical History:  Diagnosis Date    Lyme disease

## 2025-07-09 ENCOUNTER — OFFICE VISIT (OUTPATIENT)
Dept: PHYSICAL THERAPY | Facility: CLINIC | Age: 65
End: 2025-07-09
Payer: COMMERCIAL

## 2025-07-09 DIAGNOSIS — M75.122 NONTRAUMATIC COMPLETE TEAR OF ROTATOR CUFF, LEFT: Primary | ICD-10-CM

## 2025-07-09 PROCEDURE — 97110 THERAPEUTIC EXERCISES: CPT

## 2025-07-09 NOTE — PROGRESS NOTES
"Daily Note     Today's date: 2025  Patient name: Mary Russo  : 1960  MRN: [id number]  Referring provider: Graciela Bui PA-C  Dx:   Encounter Diagnosis     ICD-10-CM    1. Nontraumatic complete tear of rotator cuff, left  M75.122                      Subjective: pt has no new complaints on arrival to session overall tolerating TE well.       Objective: See treatment diary below      Assessment: Tolerated treatment well. Pt able to progress to mat AROM today w/ mild ant shoulder pain intermittently during shoulder flex, overall tolerated progression well w/ good control of L UE, slowly progress AROM as tolerated per MD protocol. Progressed postural strengthening w/ TB resistance w/o complaints. Patient would benefit from continued PT      Plan: Continue per plan of care.      Precautions: Past Medical History[1]      DOS 25  SOC: 25  FOTO: 25  POC Expiration: 25  AUTH EXP: 25  Last RE:  Daily Treatment Log:  Date 6/26/25 6/30/25 7/3/2025 7/7/25; FOTO 2025   Visit # 11 12 13 14 15    AUTH 11/ (submitted for more auth today) 12/12 13/24 14/24 15/24   AUTH EXP 25   Manual 10'       Flexion/ER/IR SJ all planes                               There Exer 30' 40'  30'  30' 40'   Objective Measures        PROM table slides flex   Wall slides B/L to support L UE 1x10  Wall slides B/L UE support 1x10  Wall slides B/L UE support 1x10  Wall slides 5\"x10    Pulleys  3'- 1 min per direction 5' flex/abd  5' flex/abd   5' flex/abd    Supine HHA AAROM flex  W/SPC; 2x10; 10x additional times trial in standing Std @ wall 1x10 Std @ wall 1x10; 2x  Std @ wall 1x10; 2x  Supine w/ cane 10\"x5    Supine cane ER in neutral w/ TR under elbow  5\"x10; 2 sets Std w/ TR @ elbow 5\"x10 Std w/ TR @ elbow 5\"x10  Std w/ TR @ elbow 5\"x10  Supine in 90 abd w/ TR 10\"x5    AAROM cane abd  In supine; 1x10 w/SPC; 10x in standing Std 1x10; 2x  Std 1x10; 2x  Std 1x10; 2x   " "  Shoulder rolls Bwd shoulder rolls 20x  Bwd shoulder rolls 20x       Scap retractions   Rows NV  NV-soreness at end of session Red tubing 1x10    B/L shoulder ext    NV  2x10 in standing w/SPC Red tubing 1x10    Pendulums-Horizontal and Vertical 20x each 20x each 2# DB 20x ea      Elbow flexion from neutral   1# DB x10  2# DB x10   3# DB; 2x10 standing 3# DB; 2x10 standing    Trap stretching 10\"x5 R/L cerv SB  10\"x5 R/L cerv SB       Table step back; flexion        Supine AROM flex    NV Standing; 2x10 w/SPC Supine AROM 1x5; 2x     SL shoulder ER w/ TR      1x10; 2x     SL AROM abd   NV Standing; 2x10 w/SPC SL AROM abd 2x5    B/L ER    NV  Standing w/ SPC and TR; 2x10 R/L AROM B/L ER 5\"x10                            HEP To be updated NV following protocol change Updated & issued       There Activ                                                        NMReed   10'  10'    6 way isometrics @ wall    5\"x10 ea  5\"x10 ea     Supine RS in 90 flex cw/ccw     1x10 ea                            Modalities                                HEP:   Access Code: [email or web address]  URL: [email or web address]  Date: 06/30/2025  Prepared by: Jayleen Almodovar    Exercises  - Seated Scapular Retraction  - 1 x daily - 7 x weekly - 1-3 sets - 10 reps  - Flexion-Extension Shoulder Pendulum with Table Support  - 2 x daily - 7 x weekly - 1-3 sets - 10 reps  - Horizontal Shoulder Pendulum with Table Support  - 2 x daily - 7 x weekly - 1-3 sets - 10 reps  - Standing Upper Trapezius Stretch  - 1 x daily - 7 x weekly - 1-2 sets - 10 reps - 5s hold  - Supine Shoulder Flexion AAROM with Hands Clasped  - 1 x daily - 7 x weekly - 2 sets - 10 reps  - Standing 'L' Stretch at Counter  - 1 x daily - 7 x weekly - 2 sets - 10 reps - 5s hold  - Shoulder Flexion Wall Slide with Towel  - 1 x daily - 7 x weekly - 1-2 sets - 10 reps  - Standing Shoulder Flexion AAROM with Dowel  - 1 x daily - 7 x weekly - 1-2 sets - 10 reps  - Standing Shoulder Abduction " ADONIS with Dowel  - 1 x daily - 7 x weekly - 1-2 sets - 10 reps      Physical Therapy Protocol: Rotator Cuff Repair Immediate Therapy    Based on Moon Shoulder Protocol    Modalities: performed at the physical therapist's discretion within the guidelines of this protocol.    Wound Care: Keep the dressing clean and dry.  Light drainage may occur the first 2 days postop.  You may remove the dressings and get the incision wet in the shower 48 hours after surgery.  DO NOT remove steri-strips or sutures.  DO NOT immerse the incision under water.  Carefully pat the incision dry.  If there is wound drainage, re-apply a fresh dry gauze dressing.    Sling: wear the sling at all times, including sleep, except for hygiene for 6 weeks following surgery. Keep the arm by the side during hygiene. The patient may remove the sling, keeping the arm by the side to perform gentle wrist and elbow range of motion. The sling may be removed for PT sessions as described below.    If ARTHROSCOPIC/OPEN SUBPECTORAL BICEPS TENODESIS was performed, the patient can perform passive and active assisted ROM as tolerated. Avoid active and resisted elbow flexion and supination until 6 weeks post-op.    If SUBSCAPULARIS REPAIR was performed, avoid external rotation beyond 30 degrees until 6 weeks post op.    PHASE I (Weeks 0-4): passive range of motion       · Begin passive range of motion within 7 days following surgery. Completed 3 times per week       · Begin scapula exercises with the arm in the sling within 7 days following surgery, including scapular elevation, depression, retraction, and protraction. Completed daily Weeks 0-6       · Begin pendulum exercises at home. Completed 2 times per day    PHASE II (Weeks 4-8): active assisted range of motion       · Week 4: lying active assisted motion            o Using a stick or cane while lying flat, the nonsurgical arm moves the injured arm through different motions, including forward flexion,  external rotation, and abduction. Completed daily       · Weeks 5: 45-degree active assisted motion            o Using a stick or cane while lying propped at 45 degrees, the nonsurgical arm moves the injured arm through different motions, including forward flexion, external rotation, and abduction. Completed daily       · Weeks 6-8: upright active assisted motion            o Using a stick or cane while sitting up or standing, the nonsurgical arm moves the injured arm through different motions, including forward flexion, external rotation, and abduction. Completed daily       · Week 6: scapula exercises without the sling, including scapular elevation, depression, retraction, and protraction. Completed daily    PHASE III (Weeks 8-12): active motion       · AROM: while sitting up or standing, actively forward flex and abduct the shoulders. Completed daily            o Important not to hike the shoulder up towards the ear.       · Isometric exercises: push and pull a folded towel or pillow into a wall. Completed daily, holding push/pull for 15s with 30s rest in between for 10-15 reps            o Completed with elbow flexed at 90 degrees and with shoulder internal/external rotation    PHASE IV (Weeks 12-16): resisted exercise       · Begin rotator cuff strengthening using weights and/or elastic bands. Completed 3 times per week for 3-4 sets of 10-15 reps            o Resisted shoulder internal/external rotation            o Resisted deltoid strengthening            o Resisted scapula strengthening            o AVOID doing full can or empty can exercises       · Begin light shoulder stretching            o Hold each stretch for 15s, then rest 15s. Repeat 5 times            o After Week 16, may use aggressive stretching if needed                               [1]   Past Medical History:  Diagnosis Date    Lyme disease

## 2025-07-10 ENCOUNTER — OFFICE VISIT (OUTPATIENT)
Age: 65
End: 2025-07-10
Payer: COMMERCIAL

## 2025-07-10 VITALS — BODY MASS INDEX: 27.16 KG/M2 | WEIGHT: 163 LBS | HEIGHT: 65 IN

## 2025-07-10 DIAGNOSIS — L57.0 ACTINIC KERATOSES: Primary | ICD-10-CM

## 2025-07-10 PROCEDURE — 17003 DESTRUCT PREMALG LES 2-14: CPT | Performed by: DERMATOLOGY

## 2025-07-10 PROCEDURE — 17000 DESTRUCT PREMALG LESION: CPT | Performed by: DERMATOLOGY

## 2025-07-10 NOTE — PATIENT INSTRUCTIONS
ACTINIC KERATOSES  - Relevant exam: On the ANF of the right nose, left nasal ala , left cheek are gritty pink papules  - Exam and clinical history consistent with actinic keratoses  - Discussed that these lesions are considered premalignant with the potential to evolve into squamous cell carcinoma.   - Discussed that these are due to chronic sun exposure and therefore recommend use of sunscreen/sun protection to prevent further sun damage  - Discussed treatment options, including liquid nitrogen destruction, topical immunotherapy, and photodynamic therapy, including risks, benefits      - After a thorough discussion of treatment options and risk/benefits/alternatives (including but not limited to local pain, scarring, dyspigmentation, blistering, and possible superinfection), verbal and written consent were obtained and the aforementioned lesions were treated on with cryotherapy using liquid nitrogen x 3 cycles for 5-10 seconds.

## 2025-07-10 NOTE — PROGRESS NOTES
"[facility] Note     Patient Name: Mary Russo  Encounter Date: 7/10/2025       Have you been cared for by a [facility] in the last 3 years and, if so, which description applies to you? Yes. I have been here within the last 3 years, and my medical history has NOT changed since that time. I am not of child-bearing potential.     REVIEW OF SYSTEMS:  Have you recently had or currently have any of the following? No changes in my recent health.   PAST MEDICAL HISTORY:  Have you personally ever had or currently have any of the following?  If \"YES,\" then please provide more detail. No changes in my medical history.   HISTORY OF IMMUNOSUPPRESSION: Do you have a history of any of the following:  Systemic Immunosuppression such as Diabetes, Biologic or Immunotherapy, Chemotherapy, Organ Transplantation, Bone Marrow Transplantation or Prednisone?  No     Answering \"YES\" requires the addition of the dotphrase \"IMMUNOSUPPRESSED\" as the first diagnosis of the patient's visit.   FAMILY HISTORY:  Any \"first degree relatives\" (parent, brother, sister, or child) with the following?    No changes in my family's known health.   PATIENT EXPERIENCE:    Do you want the Dermatologist to perform a COMPLETE skin exam today including a clinical examination under the \"bra and underwear\" areas?  NO  If necessary, do we have your permission to call and leave a detailed message on your Preferred Phone number that includes your specific medical information?  Yes      Allergies[1] Current Medications[2]              Whom besides the patient is providing clinical information about today's encounter?   NO ADDITIONAL HISTORIAN (patient alone provided history)    Physical Exam and Assessment/Plan by Diagnosis:  Patient presents today for a follow up to actinic keratoses. Patient was last seen and treated with cryotherapy of the nose and right dorsal forearm on 4/01/25.       ACTINIC KERATOSES  - Relevant exam: On the " Aks of the right nose, left nasal ala , left cheek are gritty pink papules  - Exam and clinical history consistent with actinic keratoses  - Discussed that these lesions are considered premalignant with the potential to evolve into squamous cell carcinoma.   - Discussed that these are due to chronic sun exposure and therefore recommend use of sunscreen/sun protection to prevent further sun damage  - Discussed treatment options, including liquid nitrogen destruction, topical immunotherapy, and photodynamic therapy, including risks, benefits        PROCEDURE:  DESTRUCTION OF PRE-MALIGNANT LESIONS    - After a thorough discussion of treatment options and risk/benefits/alternatives (including but not limited to local pain, scarring, dyspigmentation, blistering, and possible superinfection), verbal and written consent were obtained and the aforementioned lesions were treated on with cryotherapy using liquid nitrogen x 3 cycles for 5-10 seconds.    TOTAL NUMBER of 3 pre-malignant lesions were treated today on the ANATOMIC LOCATION: ANS of the right nose, left nasal ala , left cheek.     The patient tolerated the procedure well, and after-care instructions were provided.  .  NO evidence of cutaneous malignancy on TBE   R 6 mo    Scribe Attestation      I,:  Ade Rush MA am acting as a scribe while in the presence of the attending physician.:       I,:  Cinda Hagan MD personally performed the services described in this documentation    as scribed in my presence.:                   [1] No Known Allergies  [2]   Current Outpatient Medications:     acetaminophen (TYLENOL) 500 mg tablet, Take 2 tablets (1,000 mg total) by mouth every 8 (eight) hours, Disp: 60 tablet, Rfl: 0    naproxen (Naprosyn) 500 mg tablet, Take 1 tablet (500 mg total) by mouth 2 (two) times a day with meals, Disp: 20 tablet, Rfl: 0    oxyCODONE (Roxicodone) 5 immediate release tablet, Take 1 tablet (5 mg total) by mouth every 6 (six) hours  as needed for severe pain for up to 10 doses Max Daily Amount: 20 mg, Disp: 10 tablet, Rfl: 0    tadalafil (CIALIS) 5 MG tablet, Take 1 tablet (5 mg total) by mouth daily, Disp: 90 tablet, Rfl: 3

## 2025-07-14 ENCOUNTER — OFFICE VISIT (OUTPATIENT)
Dept: PHYSICAL THERAPY | Facility: CLINIC | Age: 65
End: 2025-07-14
Payer: COMMERCIAL

## 2025-07-14 DIAGNOSIS — M75.122 NONTRAUMATIC COMPLETE TEAR OF ROTATOR CUFF, LEFT: Primary | ICD-10-CM

## 2025-07-14 PROCEDURE — 97112 NEUROMUSCULAR REEDUCATION: CPT

## 2025-07-14 PROCEDURE — 97110 THERAPEUTIC EXERCISES: CPT

## 2025-07-14 NOTE — PROGRESS NOTES
"Daily Note     Today's date: 2025  Patient name: Mary Russo  : 1960  MRN: [id number]  Referring provider: Graciela Bui PA-C  Dx:   Encounter Diagnosis     ICD-10-CM    1. Nontraumatic complete tear of rotator cuff, left  M75.122                      Subjective: pt reports mild soreness on arrival to session today. He felt he did well w/ the progressions last session and felt fine the next day. He has had some soreness since then but he is unsure if it the way he is sleeping outside his sling/reaching activities vs the progressions here.       Objective: See treatment diary below      Assessment: Tolerated treatment well. Pt challenged most w/ ER at this time. Progressive RS and ball roll on wall today tolerated well w/o complaints. Cont to slowly progress AROM as tolerated per symptom irritability. Patient would benefit from continued PT      Plan: Continue per plan of care.      Precautions: Past Medical History[1]      DOS 25  SOC: 25  FOTO: 25  POC Expiration: 25  AUTH EXP: 25  Last RE:  Daily Treatment Log:  Date 2025  7/3/2025 7/7/25; FOTO 2025   Visit # 16  13 14 15    AUTH 16/24  13/24 14/24 15/24   AUTH EXP 25   Manual        Flexion/ER/IR                                There Exer 30'   30'  30' 40'   Objective Measures        Wall slides flex  Wall slides 5\"x10   Wall slides B/L UE support 1x10  Wall slides B/L UE support 1x10  Wall slides 5\"x10    Pulleys  5' flex/abd   5' flex/abd   5' flex/abd    Supine HHA AAROM flex  Supine w/ cane 10\"x5    Std @ wall 1x10; 2x  Std @ wall 1x10; 2x  Supine w/ cane 10\"x5    Supine cane ER in neutral w/ TR under elbow  Supine in 90 abd w/ TR 10\"x5   Std w/ TR @ elbow 5\"x10  Std w/ TR @ elbow 5\"x10  Supine in 90 abd w/ TR 10\"x5    AAROM cane abd  Std 1x10   Std 1x10; 2x  Std 1x10; 2x     Shoulder rolls        Scap retractions Red tubing 2x10   Rows NV  NV-soreness at end of session Red " "tubing 1x10    B/L shoulder ext  Red tubing 2x10   NV  2x10 in standing w/SPC Red tubing 1x10    Pendulums-Horizontal and Vertical   2# DB 20x ea      Elbow flexion from neutral   1# DB x10  2# DB x10   3# DB; 2x10 standing 3# DB; 2x10 standing    Trap stretching        Table step back; flexion        Supine AROM flex  1x10   NV Standing; 2x10 w/SPC Supine AROM 1x5; 2x     SL shoulder ER w/ TR      1x10; 2x     SL AROM abd   NV Standing; 2x10 w/SPC SL AROM abd 2x5    B/L ER  AROM B/L ER 5\"x10   NV  Standing w/ SPC and TR; 2x10 R/L AROM B/L ER 5\"x10                            HEP        There Activ                                                        NMReed 10'   10'  10'    6 way isometrics @ wall    5\"x10 ea  5\"x10 ea     Supine RS in 90 flex cw/ccw RS ball on wall 1x10 ea; 2x ea     1x10 ea    Ball roll up wall  1x10                        Modalities                                HEP:   Access Code: [email or web address]  URL: [email or web address]  Date: 06/30/2025  Prepared by: Jayleen Almodovar    Exercises  - Seated Scapular Retraction  - 1 x daily - 7 x weekly - 1-3 sets - 10 reps  - Flexion-Extension Shoulder Pendulum with Table Support  - 2 x daily - 7 x weekly - 1-3 sets - 10 reps  - Horizontal Shoulder Pendulum with Table Support  - 2 x daily - 7 x weekly - 1-3 sets - 10 reps  - Standing Upper Trapezius Stretch  - 1 x daily - 7 x weekly - 1-2 sets - 10 reps - 5s hold  - Supine Shoulder Flexion AAROM with Hands Clasped  - 1 x daily - 7 x weekly - 2 sets - 10 reps  - Standing 'L' Stretch at Counter  - 1 x daily - 7 x weekly - 2 sets - 10 reps - 5s hold  - Shoulder Flexion Wall Slide with Towel  - 1 x daily - 7 x weekly - 1-2 sets - 10 reps  - Standing Shoulder Flexion AAROM with Dowel  - 1 x daily - 7 x weekly - 1-2 sets - 10 reps  - Standing Shoulder Abduction AAROM with Dowel  - 1 x daily - 7 x weekly - 1-2 sets - 10 reps      Physical Therapy Protocol: Rotator Cuff Repair Immediate Therapy    Based on " [facility] Shoulder Protocol    Modalities: performed at the physical therapist's discretion within the guidelines of this protocol.    Wound Care: Keep the dressing clean and dry.  Light drainage may occur the first 2 days postop.  You may remove the dressings and get the incision wet in the shower 48 hours after surgery.  DO NOT remove steri-strips or sutures.  DO NOT immerse the incision under water.  Carefully pat the incision dry.  If there is wound drainage, re-apply a fresh dry gauze dressing.    Sling: wear the sling at all times, including sleep, except for hygiene for 6 weeks following surgery. Keep the arm by the side during hygiene. The patient may remove the sling, keeping the arm by the side to perform gentle wrist and elbow range of motion. The sling may be removed for PT sessions as described below.    If ARTHROSCOPIC/OPEN SUBPECTORAL BICEPS TENODESIS was performed, the patient can perform passive and active assisted ROM as tolerated. Avoid active and resisted elbow flexion and supination until 6 weeks post-op.    If SUBSCAPULARIS REPAIR was performed, avoid external rotation beyond 30 degrees until 6 weeks post op.    PHASE I (Weeks 0-4): passive range of motion       · Begin passive range of motion within 7 days following surgery. Completed 3 times per week       · Begin scapula exercises with the arm in the sling within 7 days following surgery, including scapular elevation, depression, retraction, and protraction. Completed daily Weeks 0-6       · Begin pendulum exercises at home. Completed 2 times per day    PHASE II (Weeks 4-8): active assisted range of motion       · Week 4: lying active assisted motion            o Using a stick or cane while lying flat, the nonsurgical arm moves the injured arm through different motions, including forward flexion, external rotation, and abduction. Completed daily       · Weeks 5: 45-degree active assisted motion            o Using a stick or cane while lying  propped at 45 degrees, the nonsurgical arm moves the injured arm through different motions, including forward flexion, external rotation, and abduction. Completed daily       · Weeks 6-8: upright active assisted motion            o Using a stick or cane while sitting up or standing, the nonsurgical arm moves the injured arm through different motions, including forward flexion, external rotation, and abduction. Completed daily       · Week 6: scapula exercises without the sling, including scapular elevation, depression, retraction, and protraction. Completed daily    PHASE III (Weeks 8-12): active motion       · AROM: while sitting up or standing, actively forward flex and abduct the shoulders. Completed daily            o Important not to hike the shoulder up towards the ear.       · Isometric exercises: push and pull a folded towel or pillow into a wall. Completed daily, holding push/pull for 15s with 30s rest in between for 10-15 reps            o Completed with elbow flexed at 90 degrees and with shoulder internal/external rotation    PHASE IV (Weeks 12-16): resisted exercise       · Begin rotator cuff strengthening using weights and/or elastic bands. Completed 3 times per week for 3-4 sets of 10-15 reps            o Resisted shoulder internal/external rotation            o Resisted deltoid strengthening            o Resisted scapula strengthening            o AVOID doing full can or empty can exercises       · Begin light shoulder stretching            o Hold each stretch for 15s, then rest 15s. Repeat 5 times            o After Week 16, may use aggressive stretching if needed                                  [1]   Past Medical History:  Diagnosis Date    Lyme disease

## 2025-07-17 ENCOUNTER — OFFICE VISIT (OUTPATIENT)
Dept: PHYSICAL THERAPY | Facility: CLINIC | Age: 65
End: 2025-07-17
Payer: COMMERCIAL

## 2025-07-17 DIAGNOSIS — M75.122 NONTRAUMATIC COMPLETE TEAR OF ROTATOR CUFF, LEFT: Primary | ICD-10-CM

## 2025-07-17 PROCEDURE — 97112 NEUROMUSCULAR REEDUCATION: CPT

## 2025-07-17 PROCEDURE — 97110 THERAPEUTIC EXERCISES: CPT

## 2025-07-17 NOTE — PROGRESS NOTES
"Daily Note     Today's date: 2025  Patient name: Mary Russo  : 1960  MRN: [id number]  Referring provider: Graciela Bui PA-C  Dx:   Encounter Diagnosis     ICD-10-CM    1. Nontraumatic complete tear of rotator cuff, left  M75.122                      Subjective: pt reports that his soreness is worse in the morning but he does find that his ROM is improving.       Objective: See treatment diary below      Assessment: Tolerated treatment well. Pt dem improving tolerance to supine AROM today, HEP updated to reflect this. Patient would benefit from continued PT to gradually increased supine AROM to standing to allow for increased functional reaching.       Plan: Continue per plan of care.      Precautions: Past Medical History[1]      DOS 25  SOC: 25  FOTO: 25  POC Expiration: 25  AUTH EXP: 25  Last RE:  Daily Treatment Log:  Date 2025   Visit # 16 17  RE/FOTO   15    AUTH 16/24 17/24   15/24   AUTH EXP 25   Manual        Flexion/ER/IR                                There Exer 30'  30'   30' 40'   Objective Measures        Wall slides flex  Wall slides 5\"x10  Wall slides 5\"x10    Wall slides B/L UE support 1x10  Wall slides 5\"x10    Pulleys  5' flex/abd  5' flex/abd    5' flex/abd    Supine HHA AAROM flex  Supine w/ cane 10\"x5   Supine w/ cane 2# CW 10\"x5  Std @ wall 1x10; 2x  Supine w/ cane 10\"x5    Supine cane ER in neutral w/ TR under elbow  Supine in 90 abd w/ TR 10\"x5    Std w/ TR @ elbow 5\"x10  Supine in 90 abd w/ TR 10\"x5    AAROM cane abd  Std 1x10  Std 1x10   Std 1x10; 2x     Shoulder rolls        Scap retractions Red tubing 2x10  Red tubing 2x10   NV-soreness at end of session Red tubing 1x10    B/L shoulder ext  Red tubing 2x10  Red tubing 2x10   2x10 in standing w/SPC Red tubing 1x10    Pendulums-Horizontal and Vertical        Elbow flexion from neutral  3# DB's 2x10 std   3# DB; 2x10 standing 3# DB; 2x10 standing  " "  Trap stretching        Table step back; flexion        Supine AROM flex  1x10  1x10  Standing; 2x10 w/SPC Supine AROM 1x5; 2x     SL shoulder ER w/ TR   1x10   1x10; 2x     SL AROM abd  1x10  Standing; 2x10 w/SPC SL AROM abd 2x5    B/L ER  AROM B/L ER 5\"x10  AROM B/L ER 5\"x10    Standing w/ SPC and TR; 2x10 R/L AROM B/L ER 5\"x10                            HEP        There Activ                                                        NMReed 10'  10'  10'    6 way isometrics @ wall     5\"x10 ea     Supine RS in 90 flex cw/ccw RS ball on wall 1x10 ea; 2x ea  RS ball on wall 1x10 ea; 2x ea   1x10 ea    Ball roll up wall  1x10  1x10                       Modalities                                HEP:   Access Code: [email or web address]  URL: [email or web address]  Date: 07/17/2025  Prepared by: Jayleen Almodovar    Exercises  - Standing Upper Trapezius Stretch  - 1 x daily - 7 x weekly - 1-2 sets - 10 reps - 5s hold  - Supine Shoulder Flexion AAROM with Hands Clasped  - 1 x daily - 7 x weekly - 2 sets - 10 reps  - Shoulder Flexion Wall Slide with Towel  - 1 x daily - 7 x weekly - 1-2 sets - 10 reps  - Standing Shoulder Flexion AAROM with Dowel  - 1 x daily - 7 x weekly - 1-2 sets - 10 reps  - Standing Shoulder Abduction AAROM with Dowel  - 1 x daily - 7 x weekly - 1-2 sets - 10 reps  - Supine Shoulder Flexion Extension Full Range AROM  - 1 x daily - 7 x weekly - 1-2 sets - 10 reps  - Sidelying Shoulder Abduction Full Range of Motion  - 1 x daily - 7 x weekly - 1-2 sets - 10 reps      Physical Therapy Protocol: Rotator Cuff Repair Immediate Therapy    Based on [facility] Shoulder Protocol    Modalities: performed at the physical therapist's discretion within the guidelines of this protocol.    Wound Care: Keep the dressing clean and dry.  Light drainage may occur the first 2 days postop.  You may remove the dressings and get the incision wet in the shower 48 hours after surgery.  DO NOT remove steri-strips or sutures.  DO NOT " immerse the incision under water.  Carefully pat the incision dry.  If there is wound drainage, re-apply a fresh dry gauze dressing.    Sling: wear the sling at all times, including sleep, except for hygiene for 6 weeks following surgery. Keep the arm by the side during hygiene. The patient may remove the sling, keeping the arm by the side to perform gentle wrist and elbow range of motion. The sling may be removed for PT sessions as described below.    If ARTHROSCOPIC/OPEN SUBPECTORAL BICEPS TENODESIS was performed, the patient can perform passive and active assisted ROM as tolerated. Avoid active and resisted elbow flexion and supination until 6 weeks post-op.    If SUBSCAPULARIS REPAIR was performed, avoid external rotation beyond 30 degrees until 6 weeks post op.    PHASE I (Weeks 0-4): passive range of motion       · Begin passive range of motion within 7 days following surgery. Completed 3 times per week       · Begin scapula exercises with the arm in the sling within 7 days following surgery, including scapular elevation, depression, retraction, and protraction. Completed daily Weeks 0-6       · Begin pendulum exercises at home. Completed 2 times per day    PHASE II (Weeks 4-8): active assisted range of motion       · Week 4: lying active assisted motion            o Using a stick or cane while lying flat, the nonsurgical arm moves the injured arm through different motions, including forward flexion, external rotation, and abduction. Completed daily       · Weeks 5: 45-degree active assisted motion            o Using a stick or cane while lying propped at 45 degrees, the nonsurgical arm moves the injured arm through different motions, including forward flexion, external rotation, and abduction. Completed daily       · Weeks 6-8: upright active assisted motion            o Using a stick or cane while sitting up or standing, the nonsurgical arm moves the injured arm through different motions, including forward  flexion, external rotation, and abduction. Completed daily       · Week 6: scapula exercises without the sling, including scapular elevation, depression, retraction, and protraction. Completed daily    PHASE III (Weeks 8-12): active motion       · AROM: while sitting up or standing, actively forward flex and abduct the shoulders. Completed daily            o Important not to hike the shoulder up towards the ear.       · Isometric exercises: push and pull a folded towel or pillow into a wall. Completed daily, holding push/pull for 15s with 30s rest in between for 10-15 reps            o Completed with elbow flexed at 90 degrees and with shoulder internal/external rotation    PHASE IV (Weeks 12-16): resisted exercise       · Begin rotator cuff strengthening using weights and/or elastic bands. Completed 3 times per week for 3-4 sets of 10-15 reps            o Resisted shoulder internal/external rotation            o Resisted deltoid strengthening            o Resisted scapula strengthening            o AVOID doing full can or empty can exercises       · Begin light shoulder stretching            o Hold each stretch for 15s, then rest 15s. Repeat 5 times            o After Week 16, may use aggressive stretching if needed                                     [1]   Past Medical History:  Diagnosis Date    Lyme disease

## 2025-07-21 ENCOUNTER — EVALUATION (OUTPATIENT)
Dept: PHYSICAL THERAPY | Facility: CLINIC | Age: 65
End: 2025-07-21
Payer: COMMERCIAL

## 2025-07-21 DIAGNOSIS — M75.122 NONTRAUMATIC COMPLETE TEAR OF ROTATOR CUFF, LEFT: Primary | ICD-10-CM

## 2025-07-21 PROCEDURE — 97112 NEUROMUSCULAR REEDUCATION: CPT

## 2025-07-21 PROCEDURE — 97110 THERAPEUTIC EXERCISES: CPT

## 2025-07-21 NOTE — PROGRESS NOTES
"PT Evaluation     Today's date: 2025  Patient name: Mary Russo  : 1960  MRN: [id number]  Referring provider: Graciela Bui PA-C  Dx:   Encounter Diagnosis     ICD-10-CM    1. Nontraumatic complete tear of rotator cuff, left  M75.122               Start Time: 1100  Stop Time: 1145  Total time in clinic (min): 45 minutes    Assessment  Impairments: abnormal or restricted ROM, abnormal movement, activity intolerance, impaired physical strength, lacks appropriate home exercise program, pain with function, poor posture  and poor body mechanics  Symptom irritability: high    Assessment details: 25: RE  Pt reports to his RE having completed 18 skilled PT sessions stemming from  RTC repair. Pt presents w/significant ROM and  MMT gains in addition to increased ADLS function. He can now achieve 132 deg of flexion and 105 def of abduction actively. Pt can additionally achieve 70 deg ER however remains limited w/ only 55 deg of IR currently. Pt had no pain w/MMTs w/exception of PROM flexion/abduction in supine. Most notable MMT improvements include pain free flexion and abduction rated as 4-/5 in addition to ER/IR also rated to this level. Pt expressed increased pain when \"pushing it\" during wall slides, so extensive education was given on how to push \"to\" and not \"through\" his discomfort. Plan to progress into next phase of protocol by next RE while supporting ADL function pain free as PLOF.    25: RE  Pt reports to his RE approximately 4 weeks removed from his PT IE following L RTC repair originally taking place on 25. Pt presents w/significant gains in PROM from his IE now reaching 121 deg in L shoulder flexion and achieving 103 deg in L shoulder abduction. Pt had end range pain w/all PROM measurements excluding IR, and significant pain w/ end range abduction that reduced following performance of continued PROM and subsequent pendulums. Due to pt protocol no MMTs can be taken at this time, " "however should be ready per protocol by NV to test these motions.  Pt has periodic moments where he \"forgets\" that he is not to yet move his arm actively (begins 7/17) which can increase his pain to a maximum level of 6/10. Overall pt is progressing effectively w/no setbacks at this time. Pt continues to feel the majority of his discomfort about the anterior shoulder near insertion of biceps long head. Plan to continue UE progressions as tolerated and w/in protocol moving forward to return pt to PLOF of pain free active motions and ADLs.      Pt reports to therapy demonstrating significant yet expected ROM deficits of the L shoulder due to 5/17 RTC repair. MMT not yet able to be performed about the L shoulder due to protocol restrictions; active motions begin in phase 3 (7/17) of protocol. Wrist ROM of the RUE is WNL although L wrist and elbow extension/flexion is mildly limited currently most likely due to recent donning of sling. MMT of the RUE revealed weakness about the R shoulder specifically w/ flexion, abduction, and most notably ER; ER was also painful when tested. Due to these impairments, patient has difficulty performing ADL's, recreational activities, lifting/carrying. Patient has been educated in post-op contraindications / precautions, weight bearing status, home exercise program, and plan of care. Patient would benefit from skilled physical therapy services to address their aforementioned functional limitations and progress towards prior level of function and independence with home exercise program.         Understanding of Dx/Px/POC: good     Prognosis: good    Goals  Short Term Goals to be accomplished in 4 weeks: 6/19/25  STG1: Pt will be I with HEP to maximize progress between therapy sessions -MET 6/19/25  STG2: Pt will be I with posture management to prevent impingement -MET 6/19/25  STG3: Pt will demo 10-15 deg increase in  PROM abduction/flexion -MET 6/19/25  STG4: Pt will report pain <4/10 in " "L shoulder when waking up in the morning-MET 7/21/25     Long Term Goals to be accomplished in 12 weeks: 8/14/25  LTG1: Pt will move on to phase 3 of his protocol w/o set backs beginning w/active motion at this time-MET 7/21/25  LTG2: Pt will return to work/household ADLs pain free as per PLOF including sorting mail, driving.-Ongoing 7/21/25  LTG3: Pt will demo shoulder strength <4-/5 w/less than 3<10 pain to encourage return to yardwork-MET 7/21/25    Long Term Goals to be accomplished in 16-20 weeks: 9/14/25-10/14/25  LTG1: Pt will move on to phase 4 of his protocol w/o set backs due to pain  LTG2: Pt will return to yardwork including mowing the lawn pain free as per PLOF   LTG3: Pt will demo shoulder strength at least 4+/5 w/less than 3<10 pain to encourage return to landscaping       Plan  Patient would benefit from: PT eval and skilled physical therapy  Planned modality interventions: cryotherapy and thermotherapy: hydrocollator packs    Planned therapy interventions: home exercise program, therapeutic exercise, therapeutic activities, self care, patient education, manual therapy and neuromuscular re-education    Frequency: 2x week  Duration in weeks: 12  Plan of Care beginning date: 5/22/2025  Plan of Care expiration date: 10/13/2025  Treatment plan discussed with: patient  Plan details: HEP development, stretching, strengthening, A/AA/PROM, joint mobilizations, posture education, STM/MI as needed to reduce muscle tension, muscle reeducation, PLOC discussed and agreed upon with patient.          Subjective Evaluation    History of Present Illness  Date of surgery: 5/16/2025  Mechanism of injury: surgery  Mechanism of injury: 7/21/25: RE  Pt reports to therapy citing ROM gains overall however woke up w/7/10 pain about the L shoulder. He adds that he was performing HEP, specifically wall slides, where he pushed \"too much\" and it has lead to the recent increased in pain this past Saturday. He rates his max pain " "prior to this incident as 4/10 again presenting about the anterior L shoulder. Pt states that he is now able to lift a coffee cup w/o pain and has yet to try a gallon of milk due to protocol restrictions. Pt adds current pain as 0/10.    25: RE  Pt reports to his therapy citing 0/10 current pain while noting 0/10 pain t/o most days until he is resting for the evening. He denies needing to use pain medication t/o the day however notes he might add this if trying to nap as prolonged positions can increase his pain. Pt offers 5/10 as his peak pain when attempting to go to sleep and notes that this pain typically presents about the anterior shoulder near biceps insertion. Pt is still limited in active motions due to protocol; however he adds that periodic AROM by \"mistake\" feels like it is less restricted. Pt returns to his surgeon on  to track his progress to this point and speak about potentially removing L sling w/abduction pillow.      Pt reports to therapy s/p repair of nontraumatic complete tear of left rotator cuff taking place on 25. Pt previously attended PT for b/l shoulder pain prior to receiving this L rotator cuff repair. Pt rates his peak pain at this time as \"4 or 5/10\" typically when moving the LUE by mistake. Pt is currently donning L sling w/ABD pillow. This is to be worn at all times except for when at PT and performing elbow/wrist ROM at this time. He denies paresthesias about the L shoulder, elbow,forearm or hand currently and notes that his pain has been relatively well controlled to this point. Pt looks to return to PLOF including yardwork and tree cutting pain free.           Recurrent probem    Quality of life: good    Patient Goals  Patient goals for therapy: increased strength, independence with ADLs/IADLs, improved balance and decreased pain    Pain  Current pain ratin  At best pain ratin  At worst pain ratin  Quality: dull ache, sharp and tight  Relieving factors: " medications  Aggravating factors: overhead activity and lifting    Social Support  Steps to enter house: yes  Stairs in house: yes   Lives in: multiple-level home  Lives with: adult children and spouse    Employment status: not working  Hand dominance: right          Objective    Objective:     AROM: Thoracic Spine (07/21/25)    Extension Min loss  Right Rotation Min loss      Left Rotation Min loss         AROM: Standing  R (07/21/25) L (07/21/25)  Shoulder flexion  154   132  Shoulder abduction  155   105  Shoulder ER Functional C3   NT  Shoulder IR Functional L1   NT    Shoulder ER@90  80   70  Shoulder IR   65   55     Wrist Flexion   75   70  Wrist Extension  65   60    Elbow Flexion   140   140  Elbow Extension  0   0    AROM: Supine  R (07/21/25) L (07/21/25)  Shoulder flexion  120   135  Shoulder abduction  125   110  Shoulder ER@90  55   70  Shoulder IR   70   55    PROM: Supine  R (07/21/25) L (07/21/25)  Shoulder flexion  130   135*  Shoulder abduction  130   110*  Shoulder ER@90  55   70*  Shoulder IR   70   55    STRENGTH:    R (07/21/25) L (07/21/25)    Shoulder flexion  4+/5   4-/5  Shoulder abduction  4+/5   4-/5  Shoulder ER@90  4/5   4/5  Shoulder IR   4/5   4-/5  Upper Traps   5/5   5/5  Mid/Lower Traps  4/5   4/5    Wrist Flexion   5/5   5/5  Wrist Extension  5/5   5/5    Elbow Flexion   5/5   4/5  Elbow Extension  5/5   4/5      Posture: Pt's sitting posture is mildly kyphotic about the T/s w/rounding of the R shoulder at this time w/o significant forward head posture.    Cervical Screen: Cervical AROM limitations  Flexion  Min-Mod loss (07/21/25)  Extension Min-Mod loss (07/21/25)  R rotation Min-Mod loss (07/21/25)  L rotation Min-Mod loss (07/21/25)  R sidebend Min-Mod loss (07/21/25)  L sidebend Min-Mod loss (07/21/25)  Retraction Min-Mod loss (07/21/25)         Precautions: Past Medical History[1]      DOS 5/16/25  SOC: 5/22/25  FOTO: 7/21/25  POC Expiration: 8/14/25  AUTH EXP:  "9/29/25  Last RE: 7/21/25  Daily Treatment Log:  Date 7/14/2025 7/17/2025 7/21/25 7/9/2025   Visit # 16 17  RE/FOTO   15    AUTH 16/24 17/24 18/24  15/24   AUTH EXP 9/29/25 9/29/2025 9/29/2025 9/29/2025   Manual        Flexion/ER/IR                                There Exer 30'  30'  35'  40'   Objective Measures        Wall slides flex  Wall slides 5\"x10  Wall slides 5\"x10   Wall slides 5\"x10    Wall slides 5\"x10    Pulleys  5' flex/abd  5' flex/abd    5' flex/abd    Supine HHA AAROM flex  Supine w/ cane 10\"x5   Supine w/ cane 2# CW 10\"x5 Supine w/ cane 2# CW 10\"x5  Supine w/ cane 10\"x5    Supine cane ER in neutral w/ TR under elbow  Supine in 90 abd w/ TR 10\"x5     Supine in 90 abd w/ TR 10\"x5    AAROM cane abd  Std 1x10  Std 1x10  Std 1x10      Shoulder rolls        Scap retractions Red tubing 2x10  Red tubing 2x10    Red tubing 1x10    B/L shoulder ext  Red tubing 2x10  Red tubing 2x10    Red tubing 1x10    Pendulums-Horizontal and Vertical        Elbow flexion from neutral  3# DB's 2x10 std    3# DB; 2x10 standing    Trap stretching        Table step back; flexion        Supine AROM flex  1x10  1x10   Supine AROM 1x5; 2x     SL shoulder ER w/ TR   1x10   1x10; 2x     SL AROM abd  1x10   SL AROM abd 2x5    B/L ER  AROM B/L ER 5\"x10  AROM B/L ER 5\"x10   AROM B/L ER 5\"x10    AROM B/L ER 5\"x10                            HEP        There Activ                                                        NMReed 10'  10' 10'     6 way isometrics @ wall    10x5s each direction     Supine RS in 90 flex cw/ccw RS ball on wall 1x10 ea; 2x ea  RS ball on wall 1x10 ea; 2x ea   1x10 ea    Ball roll up wall  1x10  1x10                       Modalities                                HEP:   Access Code: [email or web address]  URL: [email or web address]  Date: 07/21/2025  Prepared by: Garcia Sai    Exercises  - Standing Upper Trapezius Stretch  - 1 x daily - 7 x weekly - 1-2 sets - 10 reps - 5s hold  - Shoulder Flexion Wall Slide " with Towel  - 1 x daily - 7 x weekly - 1-2 sets - 10 reps  - Standing Shoulder Flexion AAROM with Dowel  - 1 x daily - 7 x weekly - 1-2 sets - 10 reps  - Supine Shoulder Flexion Extension Full Range AROM  - 1 x daily - 7 x weekly - 1-2 sets - 10 reps  - Standing Shoulder Abduction AAROM with Dowel  - 1 x daily - 7 x weekly - 1-2 sets - 10 reps  - Sidelying Shoulder Abduction Full Range of Motion  - 1 x daily - 7 x weekly - 1-2 sets - 10 reps  - Standing Bilateral Shoulder Internal Rotation AAROM with Dowel  - 1 x daily - 7 x weekly - 3 sets - 10 reps  - Isometric Shoulder External Rotation with Ball at Wall  - 1 x daily - 7 x weekly - 3 sets - 10 reps  - Standing Isometric Shoulder Internal Rotation at Wall with Ball  - 1 x daily - 7 x weekly - 3 sets - 10 reps  - Isometric Shoulder Flexion with Ball at Wall  - 1 x daily - 7 x weekly - 3 sets - 10 reps  - Isometric Shoulder Extension with Ball at Wall  - 1 x daily - 7 x weekly - 3 sets - 10 reps  - Isometric Shoulder Abduction with Ball at Wall  - 1 x daily - 7 x weekly - 3 sets - 10 reps             [1]   Past Medical History:  Diagnosis Date    Lyme disease

## 2025-07-24 ENCOUNTER — OFFICE VISIT (OUTPATIENT)
Dept: PHYSICAL THERAPY | Facility: CLINIC | Age: 65
End: 2025-07-24
Payer: COMMERCIAL

## 2025-07-24 DIAGNOSIS — M75.122 NONTRAUMATIC COMPLETE TEAR OF ROTATOR CUFF, LEFT: Primary | ICD-10-CM

## 2025-07-24 PROCEDURE — 97112 NEUROMUSCULAR REEDUCATION: CPT

## 2025-07-24 PROCEDURE — 97110 THERAPEUTIC EXERCISES: CPT

## 2025-07-24 NOTE — PROGRESS NOTES
"Daily Note     Today's date: 2025  Patient name: Mary Russo  : 1960  MRN: [id number]  Referring provider: Graciela Bui PA-C  Dx:   Encounter Diagnosis     ICD-10-CM    1. Nontraumatic complete tear of rotator cuff, left  M75.122           Start Time: 0845  Stop Time: 09  Total time in clinic (min): 38 minutes    Subjective: Pt reports to therapy citing low pain levels today however notes increased significant pain (8-9/10) following RE which saw him achieve end range in multiple planes. Pt adds that he held off on HEP during this time.       Objective: See treatment diary below      Assessment: Tolerated treatment well. A/AROM was the focus of today's session > strength due to pt pain presentation and subjective report of pain stemming from Tuesday RE. Plan to progress into next phase of protocol over the next 2 weeks including adding resistance to delt, scapular, and RC strengthening w/both concentric and eccentric motions. Patient would benefit from continued PT      Plan: Continue per plan of care.      Precautions: Past Medical History[1]      DOS 25  SOC: 25  FOTO: 25  POC Expiration: 25  AUTH EXP: 25  Last RE: 25  Daily Treatment Log:  Date 2025   Visit # 16 17  RE/FOTO , 18 19 15    AUTH 16/24 17/24 18/24 19/25 15/24   AUTH EXP 25   Manual        Flexion/ER/IR                                There Exer 30'  30'  35' 30' 40'   Objective Measures        Wall slides flex  Wall slides 5\"x10  Wall slides 5\"x10   Wall slides 5\"x10   Wall slides 5\"x10   Wall slides 5\"x10    Pulleys  5' flex/abd  5' flex/abd   5' flex/abd  5' flex/abd    Supine HHA AAROM flex  Supine w/ cane 10\"x5   Supine w/ cane 2# CW 10\"x5 Supine w/ cane 2# CW 10\"x5 Supine w/ cane 2# CW 10\"x5 Supine w/ cane 10\"x5    Supine cane ER in neutral w/ TR under elbow  Supine in 90 abd w/ TR 10\"x5     Supine in 90 abd w/ TR " "10\"x5    AAROM cane abd  Std 1x10  Std 1x10  Std 1x10  Std 1x10 ; 2x    Shoulder rolls        Scap retractions Red tubing 2x10  Red tubing 2x10   Red tubing 2x10  Red tubing 1x10    B/L shoulder ext  Red tubing 2x10  Red tubing 2x10   Red tubing 2x10  Red tubing 1x10    Pendulums-Horizontal and Vertical        Elbow flexion from neutral  3# DB's 2x10 std   3# DB's 2x10 std  3# DB; 2x10 standing    Trap stretching        Table step back; flexion    2x10    Supine AROM flex  1x10  1x10   Supine AROM 1x5; 2x     SL shoulder ER w/ TR   1x10   1x10; 2x     SL AROM abd  1x10   SL AROM abd 2x5    B/L ER  AROM B/L ER 5\"x10  AROM B/L ER 5\"x10    AROM B/L ER 5\"x10   AROM B/L ER 5\"x10                            HEP        There Activ                                                        NMReed 10'  10' 10' 10'    6 way isometrics @ wall    10x5s each direction 10x5s each direction    Supine RS in 90 flex cw/ccw RS ball on wall 1x10 ea; 2x ea  RS ball on wall 1x10 ea; 2x ea RS ball on wall 1x10 ea; 2x ea RS ball on wall 1x10 ea; 2x ea 1x10 ea    Ball roll up wall  1x10  1x10                       Modalities                                HEP:   Access Code: [email or web address]  URL: [email or web address]  Date: 07/21/2025  Prepared by: Garcia Gibbs    Exercises  - Standing Upper Trapezius Stretch  - 1 x daily - 7 x weekly - 1-2 sets - 10 reps - 5s hold  - Shoulder Flexion Wall Slide with Towel  - 1 x daily - 7 x weekly - 1-2 sets - 10 reps  - Standing Shoulder Flexion AAROM with Dowel  - 1 x daily - 7 x weekly - 1-2 sets - 10 reps  - Supine Shoulder Flexion Extension Full Range AROM  - 1 x daily - 7 x weekly - 1-2 sets - 10 reps  - Standing Shoulder Abduction AAROM with Dowel  - 1 x daily - 7 x weekly - 1-2 sets - 10 reps  - Sidelying Shoulder Abduction Full Range of Motion  - 1 x daily - 7 x weekly - 1-2 sets - 10 reps  - Standing Bilateral Shoulder Internal Rotation AAROM with Dowel  - 1 x daily - 7 x weekly - 3 sets - 10 " reps  - Isometric Shoulder External Rotation with Ball at Wall  - 1 x daily - 7 x weekly - 3 sets - 10 reps  - Standing Isometric Shoulder Internal Rotation at Wall with Ball  - 1 x daily - 7 x weekly - 3 sets - 10 reps  - Isometric Shoulder Flexion with Ball at Wall  - 1 x daily - 7 x weekly - 3 sets - 10 reps  - Isometric Shoulder Extension with Ball at Wall  - 1 x daily - 7 x weekly - 3 sets - 10 reps  - Isometric Shoulder Abduction with Ball at Wall  - 1 x daily - 7 x weekly - 3 sets - 10 reps                    [1]   Past Medical History:  Diagnosis Date    Lyme disease

## 2025-07-28 ENCOUNTER — OFFICE VISIT (OUTPATIENT)
Dept: PHYSICAL THERAPY | Facility: CLINIC | Age: 65
End: 2025-07-28
Payer: COMMERCIAL

## 2025-07-28 DIAGNOSIS — M75.122 NONTRAUMATIC COMPLETE TEAR OF ROTATOR CUFF, LEFT: Primary | ICD-10-CM

## 2025-07-28 PROCEDURE — 97110 THERAPEUTIC EXERCISES: CPT

## 2025-07-28 PROCEDURE — 97112 NEUROMUSCULAR REEDUCATION: CPT

## 2025-07-31 ENCOUNTER — OFFICE VISIT (OUTPATIENT)
Dept: PHYSICAL THERAPY | Facility: CLINIC | Age: 65
End: 2025-07-31
Payer: COMMERCIAL

## 2025-07-31 DIAGNOSIS — M75.122 NONTRAUMATIC COMPLETE TEAR OF ROTATOR CUFF, LEFT: Primary | ICD-10-CM

## 2025-07-31 PROCEDURE — 97110 THERAPEUTIC EXERCISES: CPT

## 2025-07-31 PROCEDURE — 97112 NEUROMUSCULAR REEDUCATION: CPT

## 2025-08-04 ENCOUNTER — OFFICE VISIT (OUTPATIENT)
Dept: PHYSICAL THERAPY | Facility: CLINIC | Age: 65
End: 2025-08-04
Payer: COMMERCIAL

## 2025-08-04 DIAGNOSIS — M75.122 NONTRAUMATIC COMPLETE TEAR OF ROTATOR CUFF, LEFT: Primary | ICD-10-CM

## 2025-08-04 PROCEDURE — 97112 NEUROMUSCULAR REEDUCATION: CPT

## 2025-08-04 PROCEDURE — 97110 THERAPEUTIC EXERCISES: CPT

## 2025-08-11 ENCOUNTER — OFFICE VISIT (OUTPATIENT)
Dept: PHYSICAL THERAPY | Facility: CLINIC | Age: 65
End: 2025-08-11
Payer: COMMERCIAL

## 2025-08-13 ENCOUNTER — OFFICE VISIT (OUTPATIENT)
Dept: PHYSICAL THERAPY | Facility: CLINIC | Age: 65
End: 2025-08-13
Payer: COMMERCIAL

## 2025-08-18 ENCOUNTER — OFFICE VISIT (OUTPATIENT)
Dept: OBGYN CLINIC | Facility: CLINIC | Age: 65
End: 2025-08-18
Payer: COMMERCIAL

## 2025-08-18 VITALS — WEIGHT: 171 LBS | BODY MASS INDEX: 28.49 KG/M2 | HEIGHT: 65 IN

## 2025-08-18 DIAGNOSIS — Z98.890 S/P LEFT ROTATOR CUFF REPAIR: ICD-10-CM

## 2025-08-18 DIAGNOSIS — M75.122 NONTRAUMATIC COMPLETE TEAR OF LEFT ROTATOR CUFF: Primary | ICD-10-CM

## 2025-08-18 PROCEDURE — 99213 OFFICE O/P EST LOW 20 MIN: CPT | Performed by: ORTHOPAEDIC SURGERY

## 2025-08-19 ENCOUNTER — OFFICE VISIT (OUTPATIENT)
Dept: PHYSICAL THERAPY | Facility: CLINIC | Age: 65
End: 2025-08-19
Payer: COMMERCIAL

## 2025-08-19 DIAGNOSIS — M75.122 NONTRAUMATIC COMPLETE TEAR OF ROTATOR CUFF, LEFT: Primary | ICD-10-CM

## 2025-08-19 PROCEDURE — 97112 NEUROMUSCULAR REEDUCATION: CPT

## (undated) DEVICE — POSITIONER TRIMANO LIMB BEACH CHAIR

## (undated) DEVICE — SLEEVE ARM SHOULDER SUSPENSION SYS

## (undated) DEVICE — CURITY STRETCH BANDAGE: Brand: CURITY

## (undated) DEVICE — GLOVE INDICATOR PI UNDERGLOVE SZ 6.5 BLUE

## (undated) DEVICE — SUT ETHILON 3-0 PS-1 18 IN 1663G

## (undated) DEVICE — NEEDLE SUT SCORPION MEGALOADER

## (undated) DEVICE — CURITY NON-ADHERENT STRIPS: Brand: CURITY

## (undated) DEVICE — TUBING ARTHROSCOPIC WAVE  MAIN PUMP

## (undated) DEVICE — GLOVE INDICATOR PI UNDERGLOVE SZ 8 BLUE

## (undated) DEVICE — CHLORAPREP HI-LITE 26ML ORANGE

## (undated) DEVICE — ASTOUND SURGICAL GOWN, XXX LARGE, X-LONG: Brand: CONVERTORS

## (undated) DEVICE — COBAN 4 IN STERILE

## (undated) DEVICE — DRESSING MEPILEX AG BORDER 4 X 4 IN

## (undated) DEVICE — INTENDED FOR TISSUE SEPARATION, AND OTHER PROCEDURES THAT REQUIRE A SHARP SURGICAL BLADE TO PUNCTURE OR CUT.: Brand: BARD-PARKER ® CARBON RIB-BACK BLADES

## (undated) DEVICE — BLADE SHAVER TORPEDO 4MM 13CM  COOLCUT

## (undated) DEVICE — GLOVE SRG BIOGEL ECLIPSE 8

## (undated) DEVICE — 3M™ STERI-DRAPE™ U-DRAPE 1015: Brand: STERI-DRAPE™

## (undated) DEVICE — GLOVE SRG BIOGEL 6.5

## (undated) DEVICE — CANNULA BUTTON 8 X 40MM PASSPORT

## (undated) DEVICE — SLING TRACTION LATERAL S3

## (undated) DEVICE — BURR  OVAL 4MM 13CM 8 FLUTE COOLCUT

## (undated) DEVICE — 3M™ IOBAN™ 2 ANTIMICROBIAL INCISE DRAPE 6650EZ: Brand: IOBAN™ 2

## (undated) DEVICE — DISPOSABLE EQUIPMENT COVER: Brand: LARGE TOWEL DRAPE

## (undated) DEVICE — TIBURON BEACH CHAIR SHOULDER DRAPE: Brand: CONVERTORS

## (undated) DEVICE — POSITIONER HEAD DISP STRL

## (undated) DEVICE — FOOT SWITCH DRAPE: Brand: UNBRANDED

## (undated) DEVICE — PACK ARTHROSCOPY

## (undated) DEVICE — TIBURON SPLIT SHEET: Brand: CONVERTORS

## (undated) DEVICE — TUBING SUCTION 5MM X 12 FT

## (undated) DEVICE — DUAL SPIKE ADAPTER: Brand: CONMED

## (undated) DEVICE — DRESSING MEPILEX AG BORDER 3 X 3 IN

## (undated) DEVICE — U-DRAPE: Brand: CONVERTORS

## (undated) DEVICE — PROBE ABLATION  APOLLORF 90 DEG MULTI PORT